# Patient Record
Sex: FEMALE | Race: WHITE | NOT HISPANIC OR LATINO | Employment: OTHER | URBAN - METROPOLITAN AREA
[De-identification: names, ages, dates, MRNs, and addresses within clinical notes are randomized per-mention and may not be internally consistent; named-entity substitution may affect disease eponyms.]

---

## 2017-02-23 ENCOUNTER — GENERIC CONVERSION - ENCOUNTER (OUTPATIENT)
Dept: OTHER | Facility: OTHER | Age: 62
End: 2017-02-23

## 2017-04-06 ENCOUNTER — ALLSCRIPTS OFFICE VISIT (OUTPATIENT)
Dept: OTHER | Facility: OTHER | Age: 62
End: 2017-04-06

## 2017-05-17 ENCOUNTER — ALLSCRIPTS OFFICE VISIT (OUTPATIENT)
Dept: OTHER | Facility: OTHER | Age: 62
End: 2017-05-17

## 2017-07-17 ENCOUNTER — ALLSCRIPTS OFFICE VISIT (OUTPATIENT)
Dept: OTHER | Facility: OTHER | Age: 62
End: 2017-07-17

## 2017-07-17 DIAGNOSIS — Z01.818 ENCOUNTER FOR OTHER PREPROCEDURAL EXAMINATION: ICD-10-CM

## 2017-07-17 DIAGNOSIS — H54.7 VISUAL LOSS: ICD-10-CM

## 2017-07-17 DIAGNOSIS — H26.9 CATARACT: ICD-10-CM

## 2017-07-18 ENCOUNTER — TRANSCRIBE ORDERS (OUTPATIENT)
Dept: LAB | Facility: CLINIC | Age: 62
End: 2017-07-18

## 2017-07-18 ENCOUNTER — APPOINTMENT (OUTPATIENT)
Dept: LAB | Facility: CLINIC | Age: 62
End: 2017-07-18
Payer: COMMERCIAL

## 2017-07-18 DIAGNOSIS — Z01.818 ENCOUNTER FOR OTHER PREPROCEDURAL EXAMINATION: ICD-10-CM

## 2017-07-18 DIAGNOSIS — H54.7 VISUAL LOSS: ICD-10-CM

## 2017-07-18 DIAGNOSIS — H26.9 CATARACT: ICD-10-CM

## 2017-07-18 LAB
APTT PPP: 26 SECONDS (ref 23–35)
BASOPHILS # BLD AUTO: 0.03 THOUSANDS/ΜL (ref 0–0.1)
BASOPHILS NFR BLD AUTO: 1 % (ref 0–1)
EOSINOPHIL # BLD AUTO: 0.26 THOUSAND/ΜL (ref 0–0.61)
EOSINOPHIL NFR BLD AUTO: 4 % (ref 0–6)
ERYTHROCYTE [DISTWIDTH] IN BLOOD BY AUTOMATED COUNT: 13.7 % (ref 11.6–15.1)
HCT VFR BLD AUTO: 41.7 % (ref 34.8–46.1)
HGB BLD-MCNC: 13.8 G/DL (ref 11.5–15.4)
INR PPP: 0.91 (ref 0.86–1.16)
LYMPHOCYTES # BLD AUTO: 2.04 THOUSANDS/ΜL (ref 0.6–4.47)
LYMPHOCYTES NFR BLD AUTO: 32 % (ref 14–44)
MCH RBC QN AUTO: 30.2 PG (ref 26.8–34.3)
MCHC RBC AUTO-ENTMCNC: 33.1 G/DL (ref 31.4–37.4)
MCV RBC AUTO: 91 FL (ref 82–98)
MONOCYTES # BLD AUTO: 0.56 THOUSAND/ΜL (ref 0.17–1.22)
MONOCYTES NFR BLD AUTO: 9 % (ref 4–12)
NEUTROPHILS # BLD AUTO: 3.49 THOUSANDS/ΜL (ref 1.85–7.62)
NEUTS SEG NFR BLD AUTO: 54 % (ref 43–75)
NRBC BLD AUTO-RTO: 0 /100 WBCS
PLATELET # BLD AUTO: 265 THOUSANDS/UL (ref 149–390)
PMV BLD AUTO: 11.8 FL (ref 8.9–12.7)
PROTHROMBIN TIME: 12.3 SECONDS (ref 12.1–14.4)
RBC # BLD AUTO: 4.57 MILLION/UL (ref 3.81–5.12)
WBC # BLD AUTO: 6.39 THOUSAND/UL (ref 4.31–10.16)

## 2017-07-18 PROCEDURE — 85610 PROTHROMBIN TIME: CPT

## 2017-07-18 PROCEDURE — 85730 THROMBOPLASTIN TIME PARTIAL: CPT

## 2017-07-18 PROCEDURE — 36415 COLL VENOUS BLD VENIPUNCTURE: CPT

## 2017-07-18 PROCEDURE — 85025 COMPLETE CBC W/AUTO DIFF WBC: CPT

## 2017-07-26 ENCOUNTER — GENERIC CONVERSION - ENCOUNTER (OUTPATIENT)
Dept: OTHER | Facility: OTHER | Age: 62
End: 2017-07-26

## 2017-09-21 ENCOUNTER — ALLSCRIPTS OFFICE VISIT (OUTPATIENT)
Dept: OTHER | Facility: OTHER | Age: 62
End: 2017-09-21

## 2017-12-14 ENCOUNTER — ALLSCRIPTS OFFICE VISIT (OUTPATIENT)
Dept: OTHER | Facility: OTHER | Age: 62
End: 2017-12-14

## 2017-12-14 DIAGNOSIS — R04.2 HEMOPTYSIS: ICD-10-CM

## 2017-12-14 DIAGNOSIS — Z78.0 ASYMPTOMATIC MENOPAUSAL STATE: ICD-10-CM

## 2017-12-14 DIAGNOSIS — Z12.31 ENCOUNTER FOR SCREENING MAMMOGRAM FOR MALIGNANT NEOPLASM OF BREAST: ICD-10-CM

## 2017-12-15 NOTE — PROGRESS NOTES
Assessment  1  Acute sinus infection (461 9) (J01 90)   2  ETD (eustachian tube dysfunction) (381 81) (H69 80)   3  Allergic rhinitis (477 9) (J30 9)   4  Hemoptysis (786 30) (R04 2)   5  Chronic obstructive pulmonary disease (496) (J44 9)   · [facility]   6  Hypothyroidism (244 9) (E03 9)   7  Hyperlipidemia (272 4) (E78 5)   8  Other screening mammogram (V76 12) (Z12 31)   9  Postmenopausal estrogen deficiency (V49 81) (Z78 0)   · On ERT per [facility]   10  Encounter for preventive health examination (V70 0) (Z00 00)    Plan  Acute sinus infection, Allergic rhinitis, Chronic obstructive pulmonary disease, HealthMaintenance, ETD (eustachian tube dysfunction), Hemoptysis, Hyperlipidemia,Hypothyroidism, Other screening mammogram, Postmenopausal estrogen deficiency    · Continue with our present treatment plan ; Status:Complete;   Done: 33Sbt698206:30PM   · Drink plenty of fluids ; Status:Complete;   Done: 77UJC2036 05:30PM   · Eat a low fat and low cholesterol diet ; Status:Complete;   Done: 70RHE9894 05:30PM   · Gargle with warm salt water for 5 minutes every 4 hours ; Status:Complete;   Done:98Lkr2667 05:30PM   · Follow-up visit in 3 months Evaluation and Treatment  Follow-up  Status: Hold For -Scheduling  Requested for: 88KWI0379   · (1) CBC/ PLT (NO DIFF); Status:Active; Requested for:14Mar2018;    · (1) COMPREHENSIVE METABOLIC PANEL; Status:Active; Requested for:14Mar2018;    · (1) LIPID PANEL, FASTING; Status:Active; Requested for:14Mar2018;    · (1) OCCULT BLOOD, FECAL IMMUNOCHEMICAL TEST; Status:Active; Requestedfor:14Mar2018;    · (1) T4, FREE; Status:Active; Requested for:14Mar2018;    · (1) TSH; Status:Active; Requested for:14Mar2018;    · (1) URINALYSIS (will reflex a microscopy if leukocytes, occult blood, protein or nitritesare not within normal limits); Status:Active; Requested for:14Mar2018;    Allergic rhinitis, ETD (eustachian tube dysfunction)    · Fluticasone Propionate 50 MCG/ACT Nasal Suspension; USE 2 SPRAYS IN EACHNOSTRIL ONCE DAILY  Hemoptysis    · * XR CHEST PA & LATERAL; Status:Active; Requested for:53Hby1712; Other screening mammogram    · * MAMMO SCREENING BILATERAL W CAD; Status:Hold For - Scheduling; Requestedfor:47Ucb2357;    · * MAMMO SCREENING BILATERAL W CAD ; every 1 year; Next 09Aug2017;Status:Active  Postmenopausal estrogen deficiency    · * DXA BONE DENSITY SPINE HIP AND PELVIS; Status:Hold For - Scheduling;Requested for:46Vuz1887;     Discussion/Summary    1  Acute sinusitis, Ceftin was ordered2  Allergic rhinitis 3  Eustachian tube dysfunction  Patient use Flonase Singulair and Claritin daily4  Hemoptysis, chest x-ray is ordered5  COPD, stable patient follows up with Pulmonary Medicine6  Hyperlipidemia, low-fat diet recommended blood work is ordered7  Hypothyroidism, blood work is ordered8  Health maintenance, mammography is ordered, DEXA scan is ordered, Hemoccult card is ordered9  Patient to return in 3 months for office visit blood work, however still symptoms next week return to office  Possible side effects of new medications were reviewed with the patient/guardian today  The treatment plan was reviewed with the patient/guardian  The patient/guardian understands and agrees with the treatment plan     Self Referrals: No      Chief Complaint  sinus problem past few weeks now she has post nasal drip, sore throat and cough with some phlem    noticed some blood with yellow mucus when she blows her nose  taking claritin over the counter  has not had a mammo in a few years  History of Present Illness  HPI: For the past week or 2 patient has had increasing sinus pain and pressure sneezing PRA postnasal drip mild otalgia negative otorrhea  Patient is using her Claritin-D and Flonase daily patient has Singulair at home but is not using it  Patient did have blood-tinged sputum  Review of Systems   Constitutional: No fever chills  ENT: as noted in HPI    Cardiovascular: no complaints of slow or fast heart rate, no chest pain, no palpitations, no leg claudication or lower extremity edema  Respiratory: as noted in HPI  Breasts: no complaints of breast pain, breast lump or nipple discharge  Gastrointestinal: no complaints of abdominal pain, no constipation, no nausea or diarrhea, no vomiting, no bloody stools  Genitourinary: Has not seen a gynecologist in over a year patient is behind under mammography  Musculoskeletal: Overdue for DEXA scan  Integumentary: no complaints of skin rash or lesion, no itching or dry skin, no skin wounds  Neurological: no complaints of headache, no confusion, no numbness or tingling, no dizziness or fainting  Active Problems  1  Allergic rhinitis (477 9) (J30 9)   2  Chronic obstructive pulmonary disease (496) (J44 9)   3  Hyperlipidemia (272 4) (E78 5)   4  Hypothyroidism (244 9) (E03 9)   5  Need for immunization against influenza (V04 81) (Z23)   6  Osteoarthritis (715 90) (M19 90)   7  Other screening mammogram (V76 12) (Z12 31)   8  Postmenopausal estrogen deficiency (V49 81) (Z78 0)   9  Ptosis Left Eye    Past Medical History  1  History of Dysfunction of Eustachian tube, unspecified laterality (381 81) (H69 80)   2  History of acute conjunctivitis (V12 49) (Z86 69)   3  History of Knee Sprain (844 9)  Active Problems And Past Medical History Reviewed: The active problems and past medical history were reviewed and updated today  Family History  Mother    1  Family history of Breast Cancer (V16 3)   2  Family history of Hypertension (V17 49)  Father    3  Family history of Reported Family History Of Alcoholism  Sister    4  Family history of Drug Dependence  Family History Reviewed: The family history was reviewed and updated today  Social History   · Daily Coffee Consumption (3  Cups/Day)   · Former smoker (H26 73) (O93 304)   · Quit over 30 years  Bartholome Pinks Bartholome Pinks Smoked for about 15 years three packs of cigarettes a day     · Occupation:   · Teacher: English as a second Language   · [facility] [employer] [employer]   · Stopped Drinking Alcohol  The social history was reviewed and updated today  Surgical History  1  History of Colonoscopy (Fiberoptic) Screening   2  History of Colostomy Revision   3  History of Shoulder Surgery   4  History of Total Abdominal Hysterectomy  Surgical History Reviewed: The surgical history was reviewed and updated today  Current Meds   1  Claritin-D 24 Hour TB24; TAKE 1 TABLET DAILY AS DIRECTED; Therapy: (Recorded:82Ych8086) to Recorded   2  Meloxicam 15 MG Oral Tablet; TAKE 1 TABLET DAILY WITH FOOD; Therapy: 15Dma0845 to (Evaluate:27Msl6659)  Requested for: 46Tjq4548; Last Rx:84Vww5347 Ordered   3  Montelukast Sodium 10 MG Oral Tablet; Take 1 tablet daily; Therapy: 84HTX7108 to (Evaluate:11Ltx3347)  Requested for: 87VCB1951; Last Rx:46Baw2801 Ordered   4  Premarin 0 625 MG Oral Tablet; TAKE 1 TABLET DAILY; Therapy: (Recorded:68Lbv1013) to Recorded   5  Spiriva HandiHaler 18 MCG Inhalation Capsule; inhale contents of 1 capsule once daily; Therapy: 50Gtd1305 to (Evaluate:10Bco6951)  Requested for: 60RDT4406; Last Rx:38Rct3945 Ordered   6  Symbicort 160-4 5 MCG/ACT Inhalation Aerosol; INHALE PUFFS  PRN  Requested for: 00Ajs5634; Last Rx:53Cut8183 Ordered    The medication list was reviewed and updated today  Allergies  1  Alcohol LIQD  2  NARCOTICS    Vitals   Recorded: 15OCH8705 05:09PM   Temperature 97 3 F   Heart Rate 60   Respiration 12   Systolic 409   Diastolic 80   Height 5 ft 9 in   Weight 192 lb    BMI Calculated 28 35   BSA Calculated 2 03     Physical Exam   Constitutional  General appearance: No acute distress, well appearing and well nourished  Eyes  Conjunctiva and lids: No swelling, erythema or discharge  Ears, Nose, Mouth, and Throat  External inspection of ears and nose: Normal    Otoscopic examination: Abnormal  -- Both tympanic membranes are dull no injection no fluid  Nasal mucosa, septum, and turbinates: Abnormal  -- Positive allergic turbinates positive pansinus tenderness to percussion  Oropharynx: Abnormal  -- Purulent postnasal drip minor injection negative exudate  Pulmonary  Respiratory effort: No increased work of breathing or signs of respiratory distress  Auscultation of lungs: Clear to auscultation  Cardiovascular  Palpation of heart: Normal PMI, no thrills  Auscultation of heart: Normal rate and rhythm, normal S1 and S2, without murmurs  Examination of extremities for edema and/or varicosities: Normal    Carotid pulses: Normal    Lymphatic  Palpation of lymph nodes in neck: Abnormal  -- Positive shotty anterior nodes  Musculoskeletal  Gait and station: Normal    Skin Warm and dry  Neurologic Negative gross focal motor deficit    Psychiatric  Mood and affect: Normal          Signatures   Electronically signed by : Robinson Nation DO; Dec 14 2017  5:33PM EST                       (Author)

## 2017-12-18 ENCOUNTER — APPOINTMENT (OUTPATIENT)
Dept: RADIOLOGY | Facility: MEDICAL CENTER | Age: 62
End: 2017-12-18
Payer: COMMERCIAL

## 2017-12-18 ENCOUNTER — TRANSCRIBE ORDERS (OUTPATIENT)
Dept: ADMINISTRATIVE | Facility: HOSPITAL | Age: 62
End: 2017-12-18

## 2017-12-18 DIAGNOSIS — R04.2 HEMOPTYSIS: ICD-10-CM

## 2017-12-18 PROCEDURE — 71020 HB CHEST X-RAY 2VW FRONTAL&LATL: CPT

## 2017-12-20 ENCOUNTER — GENERIC CONVERSION - ENCOUNTER (OUTPATIENT)
Dept: OTHER | Facility: OTHER | Age: 62
End: 2017-12-20

## 2018-01-10 NOTE — RESULT NOTES
Verified Results  * XR SHOULDER 2+ VIEW LEFT 55Rgb0205 03:55PM Kevin Kenney Order Number: [id number]     Test Name Result Flag Reference   XR SHOULDER 2+ VW LEFT (Report)     LEFT SHOULDER     INDICATION: Left shoulder pain  COMPARISON: None     VIEWS: 3; 3 images     FINDINGS:     There is no acute fracture or dislocation  There is mild osteoarthritis of the left AC joint     No lytic or blastic lesions are seen  Soft tissues are unremarkable  IMPRESSION:     Mild osteoarthritis of the left AC joint   Otherwise unremarkable study of the left shoulder       Workstation performed: [id number]     Signed by:   Melony Dyson MD   7/25/16

## 2018-01-11 NOTE — RESULT NOTES
Message   pt has moderate to severe sinusities on his CT    ask if the patient still has his sx will need to give Bactrim and flucanzole since there is suspecison of fungal infection , please make sure pt pick it at the pharmacy if he has sx   please refer pt to ENT as well due to abnormal CT of sinuses      Verified Results  CT SINUS WO CONTRAST 71Efc5143 11:04AM Shyla Adorno Order Number: [id number]    - Patient Instructions: To schedule this appointment, please contact Central Scheduling at [phone]  Test Name Result Flag Reference   CT SINUS WO CONTRAST (Report)     CT SINUSES     INDICATION: Recurrent sinus infections     COMPARISON: Radiograph 9/17/2016     TECHNIQUE: Axial CT imaging through the sinuses was performed  In addition, sagittal and coronal reformatted images were submitted for interpretation  This examination, like all CT scans performed in the [facility], was performed    utilizing techniques to minimize radiation dose exposure, including the use of iterative reconstruction and automated exposure control  IMAGE QUALITY: Diagnostic  FINDINGS:      FRONTAL SINUSES: Moderate subtotal left greater than right bilateral opacification  ETHMOID AIR CELLS: Near complete bilateral opacification  Scattered hyperdense elements especially in the posterior ethmoid air cells noted  MAXILLARY SINUSES: Frothy secretions moderately opacified the right  Milder to moderate peripheral polypoid mucosal thickening on the left  Mild hyperdense elements noted centrally admixed with the left maxillary sinus disease  SPHENOID SINUSES: Moderate to severe subtotal opacification  Scattered hyperdense elements noted centrally in the sphenoid sinus  NASAL SEPTUM AND CAVITY: Midline  Normal nasal cavity  ANTERIOR OSTEOMEATAL COMPLEX: Bilaterally occluded     OSSEOUS STRUCTURES: No bony erosion or destruction  Normal cribiform plate and Planum Spendoidale  Visulaized mastoid temporal bones are clear  The bony walls of the carotid canals are intact  SOFT TISSUES: Normal        IMPRESSION:     1  Moderate to severe pansinusitis  Scattered hyperdense elements may represent superimposed fungal colonization and/or inspissated secretions  Further clinical evaluation noted  Workstation performed: [id number]     Signed by:    Amarilys Garza MD   12/22/16       Plan  Acute recurrent sinusitis, unspecified location    · Fluconazole 200 MG Oral Tablet (Diflucan); TAKE 1 TABLET 1 TIME ONLY   · Sulfamethoxazole-Trimethoprim 800-160 MG Oral Tablet (Bactrim DS); TAKE 1  TABLET TWICE DAILY UNTIL FINISHED

## 2018-01-11 NOTE — RESULT NOTES
Verified Results  * XR SINUSES ROUTINE 3+ VIEWS 91WMH9412 05:55PM Felipe Mcneill Order Number: [id number]     Test Name Result Flag Reference   XR SINUSES ROUTINE 3+ VIEWS (Report)     PARANASAL SINUSES     INDICATION: Runny nose, postnasal drip and congestion  COMPARISON: September 8, 2010     VIEWS: 5; 5 images     FINDINGS:     Air-fluid level in both maxillary sinuses, larger on the right  Other sinuses clear  No evidence of bone destruction or osteoblastic lesion  IMPRESSION:     Air-fluid levels, consistent with acute sinusitis, in both maxillary sinuses         Workstation performed: [id number]     Signed by:   Wade Shirley MD   2/18/16

## 2018-01-12 VITALS
BODY MASS INDEX: 28.73 KG/M2 | RESPIRATION RATE: 16 BRPM | HEIGHT: 69 IN | WEIGHT: 194 LBS | DIASTOLIC BLOOD PRESSURE: 70 MMHG | HEART RATE: 68 BPM | TEMPERATURE: 98.1 F | SYSTOLIC BLOOD PRESSURE: 120 MMHG

## 2018-01-13 VITALS
RESPIRATION RATE: 16 BRPM | HEIGHT: 69 IN | WEIGHT: 185 LBS | OXYGEN SATURATION: 96 % | BODY MASS INDEX: 27.4 KG/M2 | SYSTOLIC BLOOD PRESSURE: 128 MMHG | TEMPERATURE: 97.5 F | DIASTOLIC BLOOD PRESSURE: 72 MMHG | HEART RATE: 64 BPM

## 2018-01-14 VITALS
DIASTOLIC BLOOD PRESSURE: 62 MMHG | TEMPERATURE: 97.8 F | BODY MASS INDEX: 28.73 KG/M2 | HEIGHT: 69 IN | WEIGHT: 194 LBS | SYSTOLIC BLOOD PRESSURE: 128 MMHG | RESPIRATION RATE: 16 BRPM | OXYGEN SATURATION: 96 % | HEART RATE: 72 BPM

## 2018-01-14 NOTE — RESULT NOTES
Message   Recorded as Task   Date: 08/15/2016 12:28 PM, Created By: Sánchez Pryor   Task Name: Call Back   Assigned To: Nishi Ingram   Regarding Patient: Stephon Madera, Status: Active   Comment:    Sánchez Pryor - 15 Aug 2016 12:28 PM     TASK CREATED  Patient canceled her appointment for today  Please let her know her left shoulder x-ray just shows mild arthritis at the left before meals joint otherwise shoulder was normal   Melly Deal - 17 Aug 2016 12:45 PM     TASK REPLIED TO: Previously Assigned To Gustavo,Clinical Team  Need clarification please   Sánchez Pryor - 17 Aug 2016 1:03 PM     TASK REPLIED TO: Previously Assigned To Sánchez Pryor              Shoulder x-ray showed mild arthritis at the where the collarbone meats the shoulder  I e  the AC joint  However there is still gone voice recognition puts before meals   Nishi Ingram - 17 Aug 2016 4:00 PM     TASK REPLIED TO: Previously Assigned To Gustavo,Clinical Team  what?    Sánchez Pryor - 17 Aug 2016 4:01 PM     TASK REPLIED TO: Previously Assigned To Nishi Ingram          shoulder x-ray shows mild arthritis   Nishi Ingram - 18 Aug 2016 9:29 AM     TASK EDITED                 [facility] informing pt of shoulder xray results     Signatures   Electronically signed by : Aaliyah De Leon, ; Aug 18 2016  9:29AM EST                       (Author)

## 2018-01-15 VITALS
BODY MASS INDEX: 28.44 KG/M2 | HEIGHT: 69 IN | TEMPERATURE: 98.2 F | SYSTOLIC BLOOD PRESSURE: 124 MMHG | OXYGEN SATURATION: 96 % | HEART RATE: 74 BPM | WEIGHT: 192 LBS | DIASTOLIC BLOOD PRESSURE: 76 MMHG | RESPIRATION RATE: 16 BRPM

## 2018-01-16 NOTE — RESULT NOTES
Message   Recorded as Task   Date: 12/23/2016 12:04 PM, Created By: Macey Baker   Task Name: Call Patient with results   Assigned To: Keira Regan   Regarding Patient: Kelli Michael, Status: In Progress   Luz Elena Vee - 23 Dec 2016 12:04 PM     Patient Phone: [phone]      pt has moderate to severe sinusities on his CT   ask if the patient still has his sx will need to give Bactrim and flucanzole since there is suspecison of fungal infection , please make sure pt pick it at the pharmacy if he has sx   please refer pt to ENT as well due to abnormal CT of sinuses   Nishi Ingram - 27 Dec 2016 1:19 PM     TASK IN PROGRESS   Nishi Ingram - 27 Dec 2016 1:29 PM     TASK EDITED  pt notified of CT scan results and recommendations  states within the past year she has been through 3 courses of antibiotics and 1 course prednisone  questions if it is harmful to be on so much antibiotic - and condition does not resolve    she uses [facility]     Signatures   Electronically signed by : Kevin Herrera, ; Dec 27 2016  1:40PM EST                       (Author)

## 2018-01-22 VITALS
TEMPERATURE: 97.3 F | BODY MASS INDEX: 28.44 KG/M2 | SYSTOLIC BLOOD PRESSURE: 120 MMHG | WEIGHT: 192 LBS | HEART RATE: 60 BPM | RESPIRATION RATE: 12 BRPM | HEIGHT: 69 IN | DIASTOLIC BLOOD PRESSURE: 80 MMHG

## 2018-01-23 NOTE — RESULT NOTES
Verified Results  * XR CHEST PA & LATERAL [id number] 04:46PM Alberto Crumb Order Number: [id number]     Test Name Result Flag Reference   XR CHEST PA & LATERAL (Report)     CHEST DUAL ENERGY     INDICATION: Hemoptysis and productive cough for weeks  Former smoker with COPD  COMPARISON: 9/8/2010     VIEWS: PA (including soft tissue and bone algorithms) and lateral projections; 4 images     FINDINGS:        Cardiomediastinal silhouette appears unremarkable  The lungs are clear  No pneumothorax or pleural effusion  Visualized osseous structures appear stable with old right clavicular fracture  IMPRESSION:     No active pulmonary disease  Workstation performed: [id number]     Signed by:    Jackeline Gamboa MD   12/20/17

## 2018-02-24 DIAGNOSIS — J30.9 ALLERGIC RHINITIS, UNSPECIFIED CHRONICITY, UNSPECIFIED SEASONALITY, UNSPECIFIED TRIGGER: Primary | ICD-10-CM

## 2018-02-26 PROBLEM — H26.9 BILATERAL CATARACTS: Status: ACTIVE | Noted: 2017-07-17

## 2018-02-26 RX ORDER — MONTELUKAST SODIUM 10 MG/1
TABLET ORAL
Qty: 90 TABLET | Refills: 1 | Status: SHIPPED | OUTPATIENT
Start: 2018-02-26 | End: 2018-03-20 | Stop reason: SDUPTHER

## 2018-03-14 DIAGNOSIS — Z00.00 ENCOUNTER FOR GENERAL ADULT MEDICAL EXAMINATION WITHOUT ABNORMAL FINDINGS: ICD-10-CM

## 2018-03-14 DIAGNOSIS — H69.80 OTHER SPECIFIED DISORDERS OF EUSTACHIAN TUBE, UNSPECIFIED EAR: ICD-10-CM

## 2018-03-14 DIAGNOSIS — J30.9 ALLERGIC RHINITIS: ICD-10-CM

## 2018-03-14 DIAGNOSIS — Z12.31 ENCOUNTER FOR SCREENING MAMMOGRAM FOR MALIGNANT NEOPLASM OF BREAST: ICD-10-CM

## 2018-03-14 DIAGNOSIS — E78.5 HYPERLIPIDEMIA: ICD-10-CM

## 2018-03-14 DIAGNOSIS — E03.9 HYPOTHYROIDISM: ICD-10-CM

## 2018-03-14 DIAGNOSIS — R04.2 HEMOPTYSIS: ICD-10-CM

## 2018-03-14 DIAGNOSIS — J44.9 CHRONIC OBSTRUCTIVE PULMONARY DISEASE (HCC): ICD-10-CM

## 2018-03-14 DIAGNOSIS — Z78.0 ASYMPTOMATIC MENOPAUSAL STATE: ICD-10-CM

## 2018-03-14 DIAGNOSIS — J01.90 ACUTE SINUSITIS: ICD-10-CM

## 2018-03-20 ENCOUNTER — OFFICE VISIT (OUTPATIENT)
Dept: FAMILY MEDICINE CLINIC | Facility: CLINIC | Age: 63
End: 2018-03-20
Payer: COMMERCIAL

## 2018-03-20 VITALS
WEIGHT: 191 LBS | TEMPERATURE: 97.4 F | BODY MASS INDEX: 26.74 KG/M2 | DIASTOLIC BLOOD PRESSURE: 76 MMHG | HEART RATE: 60 BPM | SYSTOLIC BLOOD PRESSURE: 140 MMHG | HEIGHT: 71 IN

## 2018-03-20 DIAGNOSIS — J44.9 CHRONIC OBSTRUCTIVE PULMONARY DISEASE, UNSPECIFIED COPD TYPE (HCC): ICD-10-CM

## 2018-03-20 DIAGNOSIS — J18.9 ATYPICAL PNEUMONIA: Primary | ICD-10-CM

## 2018-03-20 DIAGNOSIS — J30.9 ALLERGIC RHINITIS, UNSPECIFIED CHRONICITY, UNSPECIFIED SEASONALITY, UNSPECIFIED TRIGGER: ICD-10-CM

## 2018-03-20 PROCEDURE — 99214 OFFICE O/P EST MOD 30 MIN: CPT | Performed by: FAMILY MEDICINE

## 2018-03-20 RX ORDER — CYCLOSPORINE 0.5 MG/ML
EMULSION OPHTHALMIC
COMMUNITY
Start: 2018-03-18

## 2018-03-20 RX ORDER — MONTELUKAST SODIUM 10 MG/1
1 TABLET ORAL DAILY
COMMUNITY
Start: 2016-01-28 | End: 2018-09-11

## 2018-03-20 RX ORDER — LORATADINE AND PSEUDOEPHEDRINE 10; 240 MG/1; MG/1
1 TABLET, EXTENDED RELEASE ORAL DAILY
COMMUNITY

## 2018-03-20 RX ORDER — BUDESONIDE AND FORMOTEROL FUMARATE DIHYDRATE 160; 4.5 UG/1; UG/1
AEROSOL RESPIRATORY (INHALATION)
Refills: 3 | COMMUNITY
Start: 2018-02-28 | End: 2018-06-15 | Stop reason: SDUPTHER

## 2018-03-20 RX ORDER — CONJUGATED ESTROGENS 0.62 MG/1
TABLET, FILM COATED ORAL
COMMUNITY
Start: 2018-01-04 | End: 2018-07-02

## 2018-03-20 RX ORDER — AZITHROMYCIN 500 MG/1
500 TABLET, FILM COATED ORAL DAILY
Qty: 5 TABLET | Refills: 0 | Status: SHIPPED | OUTPATIENT
Start: 2018-03-20 | End: 2018-03-25

## 2018-03-20 RX ORDER — FLUTICASONE PROPIONATE 50 MCG
2 SPRAY, SUSPENSION (ML) NASAL DAILY
COMMUNITY
Start: 2017-12-14

## 2018-03-20 RX ORDER — MELOXICAM 15 MG/1
1 TABLET ORAL
COMMUNITY
Start: 2016-07-21 | End: 2020-09-16

## 2018-03-20 NOTE — PATIENT INSTRUCTIONS

## 2018-03-20 NOTE — ASSESSMENT & PLAN NOTE
Started on azithromycin 500 mg daily for 5 days, advised on the side effect of the medication, to return to the office if symptoms not better

## 2018-03-20 NOTE — ASSESSMENT & PLAN NOTE
Stable, no acute exacerbation, to continue with Spiriva, Symbicort on regular basis, to use her albuterol inhaler as needed  If symptoms get worse to return to the office for consideration of systemic steroid

## 2018-03-20 NOTE — PROGRESS NOTES
Assessment/Plan:    Atypical pneumonia  Started on azithromycin 500 mg daily for 5 days, advised on the side effect of the medication, to return to the office if symptoms not better  Chronic obstructive pulmonary disease (HCC)  Stable, no acute exacerbation, to continue with Spiriva, Symbicort on regular basis, to use her albuterol inhaler as needed  If symptoms get worse to return to the office for consideration of systemic steroid  Allergic rhinitis  Stable, continue with Singulair, Claritin, and Flonase  Diagnoses and all orders for this visit:    Atypical pneumonia  -     azithromycin (ZITHROMAX) 500 MG tablet; Take 1 tablet (500 mg total) by mouth daily for 5 days    Chronic obstructive pulmonary disease, unspecified COPD type (HCC)    Allergic rhinitis, unspecified chronicity, unspecified seasonality, unspecified trigger    Other orders  -     SYMBICORT 160-4 5 MCG/ACT inhaler; INHALE PUFFS AS NEEDED  -     loratadine-pseudoephedrine (CLARITIN-D 24 HOUR)  mg per 24 hr tablet; Take 1 tablet by mouth daily  -     conjugated estrogens (PREMARIN) vaginal cream; Insert into the vagina  -     RESTASIS 0 05 % ophthalmic emulsion;   -     PREMARIN 0 625 MG tablet;   -     fluticasone (FLONASE) 50 mcg/act nasal spray; 2 sprays into each nostril daily  -     meloxicam (MOBIC) 15 mg tablet; Take 1 tablet by mouth  -     tiotropium (SPIRIVA HANDIHALER) 18 mcg inhalation capsule; Place 1 capsule into inhaler and inhale daily  -     montelukast (SINGULAIR) 10 mg tablet; Take 1 tablet by mouth daily          Subjective: Cc:patient c/o "chronic" nasal congestion, sore throat, PND, productive cough  No ear pain  ak     Patient ID: Noelle Thrasher is a 57 y o  female      Patient's symptoms started 3-4 days ago with the nasal congestion, sore throat, productive cough of yellow phlegm moderate amount, no fever no chills, patient work as a teacher, no nausea vomiting abdominal pain, mild headache, mild nasal congestion  Patient had acute sinus infection in December 2017 and was treated with Ceftin, patient did not feel back to her baseline since then, patient had a history of COPD she is taking Symbicort with the onset of the acute flare-up which she started taking it 10 days ago, patient uses Spiriva on daily basis,        The following portions of the patient's history were reviewed and updated as appropriate: allergies, current medications, past family history, past medical history, past social history, past surgical history and problem list     Review of Systems   Constitutional: Positive for chills  Negative for activity change, appetite change, fatigue and fever  HENT: Positive for sinus pain, sinus pressure, sore throat, trouble swallowing and voice change  Negative for congestion, drooling, ear discharge, ear pain, mouth sores, rhinorrhea and sneezing  Eyes: Negative for discharge and itching  Respiratory: Positive for cough  Negative for chest tightness, shortness of breath and wheezing  Cardiovascular: Negative for chest pain and leg swelling  Gastrointestinal: Negative for abdominal pain, diarrhea, nausea and vomiting  Genitourinary: Negative for decreased urine volume, dysuria and vaginal bleeding  Musculoskeletal: Negative for arthralgias, back pain, gait problem and neck pain  Skin: Negative for rash  Allergic/Immunologic: Positive for environmental allergies  Negative for food allergies and immunocompromised state  Neurological: Positive for headaches  Psychiatric/Behavioral: Negative for agitation, behavioral problems and decreased concentration  Objective:  Vitals:    03/20/18 1738   BP: 140/76   Pulse: 60   Temp: (!) 97 4 °F (36 3 °C)   Weight: 86 6 kg (191 lb)   Height: 5' 10 5" (1 791 m)        Physical Exam   Constitutional: She is oriented to person, place, and time  She appears well-developed and well-nourished  HENT:   Head: Normocephalic and atraumatic     Eyes: Conjunctivae and EOM are normal  Pupils are equal, round, and reactive to light  Neck: Normal range of motion  Neck supple  Cardiovascular: Normal rate and regular rhythm  Pulmonary/Chest: Effort normal and breath sounds normal            Normal breath sounds on the lower lobes no expiratory wheeze   Musculoskeletal: Normal range of motion  Neurological: She is alert and oriented to person, place, and time  Nursing note and vitals reviewed

## 2018-06-15 DIAGNOSIS — J44.9 CHRONIC OBSTRUCTIVE PULMONARY DISEASE, UNSPECIFIED COPD TYPE (HCC): Primary | ICD-10-CM

## 2018-06-15 RX ORDER — BUDESONIDE AND FORMOTEROL FUMARATE DIHYDRATE 160; 4.5 UG/1; UG/1
2 AEROSOL RESPIRATORY (INHALATION) 2 TIMES DAILY
Qty: 3 INHALER | Refills: 5 | Status: SHIPPED | OUTPATIENT
Start: 2018-06-15 | End: 2019-09-04 | Stop reason: SDUPTHER

## 2018-07-02 ENCOUNTER — OFFICE VISIT (OUTPATIENT)
Dept: PULMONOLOGY | Facility: CLINIC | Age: 63
End: 2018-07-02
Payer: COMMERCIAL

## 2018-07-02 VITALS
HEIGHT: 71 IN | BODY MASS INDEX: 28.03 KG/M2 | DIASTOLIC BLOOD PRESSURE: 76 MMHG | TEMPERATURE: 98.2 F | SYSTOLIC BLOOD PRESSURE: 118 MMHG | WEIGHT: 200.2 LBS | HEART RATE: 77 BPM | OXYGEN SATURATION: 97 %

## 2018-07-02 DIAGNOSIS — J44.9 COPD (CHRONIC OBSTRUCTIVE PULMONARY DISEASE) (HCC): Primary | ICD-10-CM

## 2018-07-02 PROCEDURE — 94010 BREATHING CAPACITY TEST: CPT | Performed by: INTERNAL MEDICINE

## 2018-07-02 PROCEDURE — 99214 OFFICE O/P EST MOD 30 MIN: CPT | Performed by: INTERNAL MEDICINE

## 2018-07-02 RX ORDER — ESTROGENS, CONJUGATED 0.45 MG/1
TABLET, FILM COATED ORAL
Refills: 6 | COMMUNITY
Start: 2018-06-14 | End: 2021-10-06

## 2018-07-02 NOTE — PROGRESS NOTES
Office Progress Note - Pulmonary    Sandra Just 57 y o  female MRN: [id number]    Encounter: [id number]      Assessment:  · Chronic obstructive pulmonary disease  · Allergic rhinitis  Plan:   · Spirometry  · Symbicort 160/4 5, 2 inhalations twice a day  · Spiriva 18 mcg once a day  · Montelukast 10 mg once a day  · Claritin once a day  · Follow-up in 6 months  Discussion:   The patient's COPD is in remission  I have ordered a spirometry to document the severity of her COPD since she did not have a spirometry in a long while  I have maintained her on the Symbicort to 160/4 5, 2 inhalations twice a day  I have emphasized on the importance of taking the inhaler twice a day  Also I have maintained her on the Spiriva once a day  Her allergic rhinitis is well treated  I have maintained her on the fluticasone nasal spray 2 sprays to each nostril once a day and montelukast 10 mg once a day  As she is retiring her prescription plan may change and she may have a different inhaler as preferred for her COPD then the Symbicort  We will address that if it happens  I will see her in 6 months in a follow-up visit  Subjective:   Patient is here for a follow-up visit  In March she had upper respiratory infection which caused severe laryngitis  She lost her voice for about 2 weeks  She was treated with azithromycin and her symptoms improved slowly  She is back to her baseline  She denies any significant cough, wheezing or sputum production  Denies any chest pain or palpitations  She is taking Symbicort 160/4 5, 2 inhalations twice a day and Spiriva 18 mcg 1 inhalation once a day  Denies any significant postnasal dripping  She is taken fluticasone 2 sprays to each nostril once a day, montelukast 10 mg once a day and Claritin once a day  She has no nocturnal symptoms  She just has retired from her job  Her health coverage and prescription plan will change by the end of this year      Review of systems:  A 12 point system review is done and aside from what is stated above the rest of the review of systems is negative  Family history and social history are reviewed  Medications list is reviewed  Vitals: Blood pressure 118/76, pulse 77, temperature 98 2 °F (36 8 °C), temperature source Tympanic, height 5' 10 5" (1 791 m), weight 90 8 kg (200 lb 3 2 oz), SpO2 97 %  ,     Physical Exam  Gen: Awake, alert, oriented x 3, no acute distress  HEENT: Mucous membranes moist, no oral lesions, no thrush  NECK: No accessory muscle use, JVP not elevated  Cardiac: Regular, single S1, single S2, no murmurs, no rubs, no gallops  Lungs:  Clear breath sounds  No wheezing or rhonchi  Abdomen: normoactive bowel sounds, soft nontender, nondistended, no rebound or rigidity, no guarding  Extremities: no cyanosis, no clubbing, no edema  Neuro:  Grossly nonfocal   Skin:  No rash  Spirometry done in the office today and it showed restrictive pattern with no airflow limitation

## 2018-09-11 DIAGNOSIS — J30.9 ALLERGIC RHINITIS: ICD-10-CM

## 2018-09-11 RX ORDER — MONTELUKAST SODIUM 10 MG/1
TABLET ORAL
Qty: 90 TABLET | Refills: 1 | Status: SHIPPED | OUTPATIENT
Start: 2018-09-11 | End: 2019-05-10 | Stop reason: SDUPTHER

## 2018-12-18 ENCOUNTER — OFFICE VISIT (OUTPATIENT)
Dept: FAMILY MEDICINE CLINIC | Facility: CLINIC | Age: 63
End: 2018-12-18
Payer: MEDICARE

## 2018-12-18 VITALS
SYSTOLIC BLOOD PRESSURE: 114 MMHG | HEART RATE: 72 BPM | TEMPERATURE: 97.4 F | DIASTOLIC BLOOD PRESSURE: 72 MMHG | WEIGHT: 195 LBS | BODY MASS INDEX: 27.3 KG/M2 | RESPIRATION RATE: 20 BRPM | HEIGHT: 71 IN

## 2018-12-18 DIAGNOSIS — J01.40 ACUTE PANSINUSITIS, RECURRENCE NOT SPECIFIED: Primary | ICD-10-CM

## 2018-12-18 DIAGNOSIS — H65.03 BILATERAL ACUTE SEROUS OTITIS MEDIA, RECURRENCE NOT SPECIFIED: ICD-10-CM

## 2018-12-18 DIAGNOSIS — E66.3 OVERWEIGHT (BMI 25.0-29.9): ICD-10-CM

## 2018-12-18 DIAGNOSIS — J30.9 ALLERGIC RHINITIS, UNSPECIFIED SEASONALITY, UNSPECIFIED TRIGGER: ICD-10-CM

## 2018-12-18 DIAGNOSIS — J44.9 CHRONIC OBSTRUCTIVE PULMONARY DISEASE, UNSPECIFIED COPD TYPE (HCC): ICD-10-CM

## 2018-12-18 DIAGNOSIS — H69.80 DYSFUNCTION OF EUSTACHIAN TUBE, UNSPECIFIED LATERALITY: ICD-10-CM

## 2018-12-18 PROBLEM — J18.9 ATYPICAL PNEUMONIA: Status: RESOLVED | Noted: 2018-03-20 | Resolved: 2018-12-18

## 2018-12-18 PROBLEM — H69.90 EUSTACHIAN TUBE DYSFUNCTION: Status: ACTIVE | Noted: 2018-12-18

## 2018-12-18 PROCEDURE — 99214 OFFICE O/P EST MOD 30 MIN: CPT | Performed by: FAMILY MEDICINE

## 2018-12-18 RX ORDER — CEPHALEXIN 500 MG/1
CAPSULE ORAL
Qty: 21 CAPSULE | Refills: 0 | Status: SHIPPED | OUTPATIENT
Start: 2018-12-18 | End: 2018-12-25

## 2018-12-18 RX ORDER — IPRATROPIUM BROMIDE 21 UG/1
SPRAY, METERED NASAL
Qty: 30 ML | Refills: 5 | Status: SHIPPED | OUTPATIENT
Start: 2018-12-18 | End: 2019-04-08 | Stop reason: SDUPTHER

## 2018-12-18 NOTE — PATIENT INSTRUCTIONS
Return in 1 week if still symptoms  A before getting on airline, use 2 sprays of Afrin nasal spray the night before trip  And 2 sprays of Afrin before going to airport    Do the same upon return

## 2018-12-18 NOTE — PROGRESS NOTES
Assessment/Plan:  1  Acute pansinusitis, Keflex ordered  2  Per allergic rhinitis 3  Eustachian tube dysfunction 4  Serous otitis media  Continue present therapy Atrovent nasal spray was added  Refer to allergist patient wishes to go in spring or summer  5  COPD, stable patient follows up with Pulmonary Medicine  6  Patient will be flying in January is concerned about her head congestion while on the airplane  Patient told to take Afrin nasal spray 2 sprays the night before 2 sprays at airport before flight  7   Return in 1 week if still symptoms      Eustachian tube dysfunction  Atrovent was prescribed    Allergic rhinitis  Atrovent nasal spray was ordered continue all prior medications  Discussed allergy consultation  Patient will consider in spring or summer  Chronic obstructive pulmonary disease (HCC)  Stable, follow-up with Pulmonary Medicine       Diagnoses and all orders for this visit:    Acute pansinusitis, recurrence not specified  -     cephalexin (KEFLEX) 500 mg capsule; Take 1 capsule 3 times daily for 1 week    Dysfunction of Eustachian tube, unspecified laterality  -     ipratropium (ATROVENT) 0 03 % nasal spray; Use 2 sprays both nostrils 3 times a day as needed    Allergic rhinitis, unspecified seasonality, unspecified trigger  -     Ambulatory referral to Allergy; Future  -     ipratropium (ATROVENT) 0 03 % nasal spray; Use 2 sprays both nostrils 3 times a day as needed    Chronic obstructive pulmonary disease, unspecified COPD type (HCC)    Bilateral acute serous otitis media, recurrence not specified  -     ipratropium (ATROVENT) 0 03 % nasal spray; Use 2 sprays both nostrils 3 times a day as needed    Overweight (BMI 25 0-29  9)          Subjective: Pt here with complains of sinus pressure, sinus drainage x 3 weeks  Pt states she has been issues for a couple of years off and on  JOSEFINA Wilson     Patient ID: Jason Sow is a 70 y o  female      The past 3 days patient increasing sinus pain and pressure sneezing PRA postnasal drip  Patient denies any cough or wheeze  No fever chills  Patient is using her medication for COPD allergies as directed  Patient follows up with Pulmonary Medicine, Dr Natasha Forde for her COPD        The following portions of the patient's history were reviewed and updated as appropriate: allergies, current medications, past family history, past medical history, past social history, past surgical history and problem list     Review of Systems   Constitutional:        HPI   HENT:        HPI   Eyes: Negative  Respiratory:        HPI   Cardiovascular: Negative for chest pain  Gastrointestinal: Negative  Endocrine: Negative  Genitourinary: Negative  Musculoskeletal: Negative  Skin: Negative  Allergic/Immunologic: Positive for environmental allergies  Neurological: Negative  Hematological: Negative  Psychiatric/Behavioral: Negative  Objective:    Vitals:    12/18/18 1251   BP: 114/72   BP Location: Left arm   Patient Position: Sitting   Cuff Size: Large   Pulse: 72   Resp: 20   Temp: (!) 97 4 °F (36 3 °C)   TempSrc: Temporal   Weight: 88 5 kg (195 lb)   Height: 5' 10 5" (1 791 m)            Physical Exam   Constitutional: She is oriented to person, place, and time  She appears well-developed and well-nourished  HENT:   Head: Normocephalic and atraumatic  Mouth/Throat: No oropharyngeal exudate  Both tympanic membranes dull with a minimal amount of fluid no injection positive allergic turbinates positive pansinus tenderness to percussion positive purulent postnasal drip minimal pharyngeal injection negative exudate   Eyes: Pupils are equal, round, and reactive to light  Conjunctivae and EOM are normal  No scleral icterus  Neck: Neck supple  No thyromegaly present  Cardiovascular: Normal rate, regular rhythm and normal heart sounds      Pulmonary/Chest: Effort normal and breath sounds normal    Lymphadenopathy:     She has cervical adenopathy  Neurological: She is alert and oriented to person, place, and time  No cranial nerve deficit  Skin: Skin is warm and dry  BMI Counseling: Body mass index is 27 58 kg/m²  Discussed the patient's BMI with her  The BMI is above average  BMI counseling and education was provided to the patient  Nutrition recommendations include reducing intake of cholesterol

## 2018-12-18 NOTE — ASSESSMENT & PLAN NOTE
Atrovent nasal spray was ordered continue all prior medications  Discussed allergy consultation  Patient will consider in spring or summer

## 2019-01-03 DIAGNOSIS — J44.9 COPD (CHRONIC OBSTRUCTIVE PULMONARY DISEASE) (HCC): Primary | ICD-10-CM

## 2019-01-03 RX ORDER — TIOTROPIUM BROMIDE 18 UG/1
CAPSULE ORAL; RESPIRATORY (INHALATION)
Qty: 30 CAPSULE | Refills: 5 | Status: SHIPPED | OUTPATIENT
Start: 2019-01-03 | End: 2019-07-16 | Stop reason: SDUPTHER

## 2019-04-08 DIAGNOSIS — J30.9 ALLERGIC RHINITIS, UNSPECIFIED SEASONALITY, UNSPECIFIED TRIGGER: ICD-10-CM

## 2019-04-08 DIAGNOSIS — H65.03 BILATERAL ACUTE SEROUS OTITIS MEDIA, RECURRENCE NOT SPECIFIED: ICD-10-CM

## 2019-04-08 DIAGNOSIS — H69.80 DYSFUNCTION OF EUSTACHIAN TUBE, UNSPECIFIED LATERALITY: ICD-10-CM

## 2019-04-08 RX ORDER — IPRATROPIUM BROMIDE 21 UG/1
SPRAY, METERED NASAL
Qty: 30 ML | Refills: 4 | Status: SHIPPED | OUTPATIENT
Start: 2019-04-08 | End: 2021-04-12

## 2019-05-10 DIAGNOSIS — J30.9 ALLERGIC RHINITIS: ICD-10-CM

## 2019-05-10 RX ORDER — MONTELUKAST SODIUM 10 MG/1
TABLET ORAL
Qty: 90 TABLET | Refills: 1 | Status: SHIPPED | OUTPATIENT
Start: 2019-05-10 | End: 2019-11-22 | Stop reason: SDUPTHER

## 2019-06-20 ENCOUNTER — OFFICE VISIT (OUTPATIENT)
Dept: FAMILY MEDICINE CLINIC | Facility: CLINIC | Age: 64
End: 2019-06-20
Payer: MEDICARE

## 2019-06-20 VITALS
RESPIRATION RATE: 20 BRPM | BODY MASS INDEX: 26.32 KG/M2 | DIASTOLIC BLOOD PRESSURE: 68 MMHG | WEIGHT: 188 LBS | SYSTOLIC BLOOD PRESSURE: 116 MMHG | TEMPERATURE: 97.7 F | HEART RATE: 72 BPM | HEIGHT: 71 IN

## 2019-06-20 DIAGNOSIS — R05.9 COUGH: ICD-10-CM

## 2019-06-20 DIAGNOSIS — H69.80 DYSFUNCTION OF EUSTACHIAN TUBE, UNSPECIFIED LATERALITY: ICD-10-CM

## 2019-06-20 DIAGNOSIS — J30.9 ALLERGIC RHINITIS, UNSPECIFIED SEASONALITY, UNSPECIFIED TRIGGER: ICD-10-CM

## 2019-06-20 DIAGNOSIS — J44.9 CHRONIC OBSTRUCTIVE PULMONARY DISEASE, UNSPECIFIED COPD TYPE (HCC): ICD-10-CM

## 2019-06-20 DIAGNOSIS — Z78.0 POSTMENOPAUSAL ESTROGEN DEFICIENCY: ICD-10-CM

## 2019-06-20 DIAGNOSIS — E78.5 HYPERLIPIDEMIA, UNSPECIFIED HYPERLIPIDEMIA TYPE: ICD-10-CM

## 2019-06-20 DIAGNOSIS — J01.40 ACUTE PANSINUSITIS, RECURRENCE NOT SPECIFIED: Primary | ICD-10-CM

## 2019-06-20 DIAGNOSIS — H66.003 ACUTE SUPPURATIVE OTITIS MEDIA OF BOTH EARS WITHOUT SPONTANEOUS RUPTURE OF TYMPANIC MEMBRANES, RECURRENCE NOT SPECIFIED: ICD-10-CM

## 2019-06-20 DIAGNOSIS — Z12.31 SCREENING MAMMOGRAM, ENCOUNTER FOR: ICD-10-CM

## 2019-06-20 DIAGNOSIS — E03.9 HYPOTHYROIDISM, UNSPECIFIED TYPE: ICD-10-CM

## 2019-06-20 PROCEDURE — 99214 OFFICE O/P EST MOD 30 MIN: CPT | Performed by: FAMILY MEDICINE

## 2019-06-20 RX ORDER — CEPHALEXIN 500 MG/1
500 CAPSULE ORAL EVERY 8 HOURS SCHEDULED
Qty: 30 CAPSULE | Refills: 0 | Status: SHIPPED | OUTPATIENT
Start: 2019-06-20 | End: 2019-06-30

## 2019-07-15 ENCOUNTER — TELEPHONE (OUTPATIENT)
Dept: PULMONOLOGY | Facility: CLINIC | Age: 64
End: 2019-07-15

## 2019-07-15 NOTE — TELEPHONE ENCOUNTER
Pharmacy called for a refill on patients Spiriva  Called patient to set up follow up appt first as we have no seen her in over a year

## 2019-07-16 DIAGNOSIS — J44.9 COPD (CHRONIC OBSTRUCTIVE PULMONARY DISEASE) (HCC): ICD-10-CM

## 2019-07-23 ENCOUNTER — TELEPHONE (OUTPATIENT)
Dept: PULMONOLOGY | Facility: HOSPITAL | Age: 64
End: 2019-07-23

## 2019-07-23 NOTE — TELEPHONE ENCOUNTER
LM to patient, Karson won't be available on 9/3 ([facility] office) I moved pt's appt to 9/4 @ 8:30

## 2019-09-04 ENCOUNTER — OFFICE VISIT (OUTPATIENT)
Dept: PULMONOLOGY | Facility: CLINIC | Age: 64
End: 2019-09-04
Payer: MEDICARE

## 2019-09-04 VITALS
BODY MASS INDEX: 26.05 KG/M2 | OXYGEN SATURATION: 96 % | TEMPERATURE: 96.9 F | HEIGHT: 71 IN | SYSTOLIC BLOOD PRESSURE: 140 MMHG | WEIGHT: 186.07 LBS | DIASTOLIC BLOOD PRESSURE: 82 MMHG | HEART RATE: 59 BPM | RESPIRATION RATE: 16 BRPM

## 2019-09-04 DIAGNOSIS — J30.9 ALLERGIC RHINITIS, UNSPECIFIED SEASONALITY, UNSPECIFIED TRIGGER: ICD-10-CM

## 2019-09-04 DIAGNOSIS — J44.9 COPD (CHRONIC OBSTRUCTIVE PULMONARY DISEASE) (HCC): ICD-10-CM

## 2019-09-04 DIAGNOSIS — J44.9 CHRONIC OBSTRUCTIVE PULMONARY DISEASE, UNSPECIFIED COPD TYPE (HCC): Primary | ICD-10-CM

## 2019-09-04 PROCEDURE — 99214 OFFICE O/P EST MOD 30 MIN: CPT | Performed by: NURSE PRACTITIONER

## 2019-09-04 RX ORDER — BUDESONIDE AND FORMOTEROL FUMARATE DIHYDRATE 160; 4.5 UG/1; UG/1
2 AEROSOL RESPIRATORY (INHALATION) 2 TIMES DAILY
Qty: 3 INHALER | Refills: 3 | Status: SHIPPED | OUTPATIENT
Start: 2019-09-04 | End: 2020-11-09

## 2019-09-04 NOTE — ASSESSMENT & PLAN NOTE
· Well controlled COPD, no recent exacerbations  · No need to perform spirometry as this was performed at previous visit with Dr Simon Rasmussen 8%predicted, FVC 79% predicted  · Maintains on Spiriva handihaler 18 mcg I puff daily and symbicort 160-4  5MCG/ACT INH 2 puffs BID and reports compliance with regimen  · Reports intermittent cough productive of scant amounts of clear or white sputum  · Denies GERD symptoms-negative workup in the past  · Currently reports symptoms are well controlled with regimen  · All prescription refills provided as needed  · Follow up  In 6 months

## 2019-09-04 NOTE — PROGRESS NOTES
Pulmonary Follow Up Note   Sadia Londono 75 y o  female MRN: [id number]  9/4/2019      Assessment:    Chronic obstructive pulmonary disease ([facility] )  · Well controlled COPD, no recent exacerbations  · No need to perform spirometry as this was performed at previous visit with Dr Dakota Patel 8%predicted, FVC 79% predicted  · Maintains on Spiriva handihaler 18 mcg I puff daily and symbicort 160-4  5MCG/ACT INH 2 puffs BID and reports compliance with regimen  · Reports intermittent cough productive of scant amounts of clear or white sputum  · Denies GERD symptoms-negative workup in the past  · Currently reports symptoms are well controlled with regimen  · All prescription refills provided as needed  · Follow up  In 6 months    Allergic rhinitis  · Maintains on singulair, claritin, flonase and atrovent nasal spray  · Reports chronic nasal congestion with 2 rounds of ABX since January 2019  · Currently reports congestion is well controlled, will continue with current regimen      Plan:    Diagnoses and all orders for this visit:    Chronic obstructive pulmonary disease, unspecified COPD type ([facility] )  -     SYMBICORT 160-4 5 MCG/ACT inhaler; Inhale 2 puffs 2 (two) times a day Rinse mouth after use  Allergic rhinitis, unspecified seasonality, unspecified trigger    COPD (chronic obstructive pulmonary disease) ([facility])  -     tiotropium (SPIRIVA HANDIHALER) 18 mcg inhalation capsule; Place 1 capsule (18 mcg total) into inhaler and inhale daily        Return in about 6 months (around 3/4/2020), or if symptoms worsen or fail to improve  History of Present Illness   HPI:  Sadia Londono is a 75 y o  female seen in follow up  She has a PMH significant for: allergic rhinitis, COPD and hypothyroidism  Her COPD is currently well controlled, has not required prednisone burst in >1 years  Reports she is currently at her pulmonary baseline    Her main concern is with nasal congestion over the past 6 months, with 2 infections requiring ABX course  Currently nasal congestion is minimal and well controlled with current regimen  Denies sinus pressure, headaches or nasal drainage  Jacqueline of colon Claritin, Singulair, Flonase and Atrovent nasal spray  A recommended that if her symptoms greatly increase in she continues to have recurrent sinus infections to be re-evaluated by ENT  Concern to her Chronic obstructive pulmonary disease all symptoms are well controlled she will continue on her current regimen of Symbicort 160-4 5 mcg 2 puffs b i d  And Spiriva HandiHaler 18 mcg 1 puff daily  She requested samples of Symbicort today,  As well as refills which were provided  She currently denies:  Chest pain, pain with inspiration, fevers, chills, night sweats, bronchospasm,   Or hemoptysis  She reports intermittent scant amounts of clear / white sputum  She denies symptoms of: HAYDEE, GERD or dysphagia  Denies any significant limitations due to her breathing  She reports that known triggers include: Heat and humidity  Follow-up in 6 months or sooner with Dr Zita Lenz    Review of Systems   Constitutional: Negative for activity change, appetite change, chills, diaphoresis, fatigue, fever and unexpected weight change  HENT: Positive for postnasal drip  Negative for rhinorrhea, sinus pressure, sinus pain, sore throat and trouble swallowing  Eyes: Negative for discharge  Respiratory: Negative for apnea, cough, choking, chest tightness, shortness of breath, wheezing and stridor  Cardiovascular: Negative for chest pain and leg swelling  Gastrointestinal: Negative for abdominal distention  Endocrine: Negative for cold intolerance and heat intolerance  Genitourinary: Negative for difficulty urinating  Musculoskeletal: Negative for arthralgias and back pain  Allergic/Immunologic: Negative for environmental allergies, food allergies and immunocompromised state     Neurological: Negative for dizziness, facial asymmetry, light-headedness and headaches  Hematological: Negative for adenopathy  Does not bruise/bleed easily  Psychiatric/Behavioral: Negative for agitation  All other systems reviewed and are negative  Historical Information   Past Medical History:   Diagnosis Date    Cataract     History of total hysterectomy      Past Surgical History:   Procedure Laterality Date    CATARACT EXTRACTION, BILATERAL      COLONOSCOPY      HYSTERECTOMY      REVISION COLOSTOMY      SHOULDER SURGERY      S/P dislocated shoulder     Family History   Problem Relation Age of Onset   Sangita Griggs Breast cancer Mother     Hypertension Mother     Alcohol abuse Father     Drug abuse Sister        Social History     Tobacco Use   Smoking Status Former Smoker    Packs/day: 3 00    Years: 15 00    Pack years: 45 00    Types: Cigarettes    Start date:     Last attempt to quit:     Years since quittin 6   Smokeless Tobacco Never Used         Meds/Allergies     Current Outpatient Medications:     fluticasone (FLONASE) 50 mcg/act nasal spray, 2 sprays into each nostril daily, Disp: , Rfl:     ipratropium (ATROVENT) 0 03 % nasal spray, USE 2 SPRAYS BOTH NOSTRILS 3 TIMES A DAY AS NEEDED, Disp: 30 mL, Rfl: 4    loratadine-pseudoephedrine (CLARITIN-D 24 HOUR)  mg per 24 hr tablet, Take 1 tablet by mouth daily, Disp: , Rfl:     meloxicam (MOBIC) 15 mg tablet, Take 1 tablet by mouth, Disp: , Rfl:     montelukast (SINGULAIR) 10 mg tablet, TAKE 1 TABLET DAILY, Disp: 90 tablet, Rfl: 1    PREMARIN 0 45 MG tablet, TAKE 1 TABLET (0 45 MG TOTAL) BY MOUTH DAILY   FOR 21 DAYS, THEN 7 DAYS OFF, Disp: , Rfl: 6    RESTASIS 0 05 % ophthalmic emulsion, , Disp: , Rfl:     SYMBICORT 160-4 5 MCG/ACT inhaler, Inhale 2 puffs 2 (two) times a day Rinse mouth after use , Disp: 3 Inhaler, Rfl: 3    tiotropium (SPIRIVA HANDIHALER) 18 mcg inhalation capsule, Place 1 capsule (18 mcg total) into inhaler and inhale daily, Disp: 90 capsule, Rfl: 3  Allergies   Allergen Reactions    Alcohol     Other      nicotics         Vitals: Blood pressure 140/82, pulse 59, temperature (!) 96 9 °F (36 1 °C), temperature source Tympanic, resp  rate 16, height 5' 10 5" (1 791 m), weight 84 4 kg (186 lb 1 1 oz), SpO2 96 %  Body mass index is 26 32 kg/m²  Oxygen Therapy  SpO2: 96 %  Oxygen Therapy: None (Room air)    Physical Exam  Physical Exam   Constitutional: She is oriented to person, place, and time  She appears well-developed and well-nourished  No distress  HENT:   Head: Normocephalic and atraumatic  Eyes: Pupils are equal, round, and reactive to light  Conjunctivae and EOM are normal    Neck: Normal range of motion  Neck supple  No tracheal deviation present  Cardiovascular: Normal rate, regular rhythm, normal heart sounds and intact distal pulses  Exam reveals no gallop and no friction rub  No murmur heard  Pulmonary/Chest: Effort normal and breath sounds normal  No stridor  No respiratory distress  She has no wheezes  She has no rales  She exhibits no tenderness  Abdominal: Soft  Bowel sounds are normal    Musculoskeletal: Normal range of motion  She exhibits no edema  Neurological: She is alert and oriented to person, place, and time  Skin: Skin is warm and dry  No erythema  Psychiatric: She has a normal mood and affect  Her behavior is normal  Judgment and thought content normal    Vitals reviewed  Labs: I have personally reviewed pertinent lab results    Lab Results   Component Value Date    WBC 6 39 07/18/2017    HGB 13 8 07/18/2017    HCT 41 7 07/18/2017    MCV 91 07/18/2017     07/18/2017     Lab Results   Component Value Date    GLUCOSE 83 12/02/2013    CALCIUM 9 1 12/02/2013     12/02/2013    K 5 0 12/02/2013    CO2 32 12/02/2013     12/02/2013    BUN 21 12/02/2013    CREATININE 0 86 12/02/2013     No results found for: IGE  Lab Results   Component Value Date    ALT 25 12/02/2013    AST 17 12/02/2013 ALKPHOS 62 12/02/2013    BILITOT 0 23 12/02/2013       Imaging and other studies: no new imaging to review    Pulmonary function testing:  Performed Spirometry  7/2/2018  FEV1/FVC ratio  77%   FEV1  78% predicted  FVC  79% predicted

## 2019-09-04 NOTE — ASSESSMENT & PLAN NOTE
· Maintains on singulair, claritin, flonase and atrovent nasal spray  · Reports chronic nasal congestion with 2 rounds of ABX since January 2019    · Currently reports congestion is well controlled, will continue with current regimen

## 2019-09-12 ENCOUNTER — APPOINTMENT (OUTPATIENT)
Dept: LAB | Facility: CLINIC | Age: 64
End: 2019-09-12
Payer: MEDICARE

## 2019-09-12 DIAGNOSIS — R05.9 COUGH: ICD-10-CM

## 2019-09-12 DIAGNOSIS — E78.5 HYPERLIPIDEMIA, UNSPECIFIED HYPERLIPIDEMIA TYPE: ICD-10-CM

## 2019-09-12 DIAGNOSIS — E03.9 HYPOTHYROIDISM, UNSPECIFIED TYPE: ICD-10-CM

## 2019-09-12 DIAGNOSIS — H66.003 ACUTE SUPPURATIVE OTITIS MEDIA OF BOTH EARS WITHOUT SPONTANEOUS RUPTURE OF TYMPANIC MEMBRANES, RECURRENCE NOT SPECIFIED: ICD-10-CM

## 2019-09-12 DIAGNOSIS — Z12.31 SCREENING MAMMOGRAM, ENCOUNTER FOR: ICD-10-CM

## 2019-09-12 DIAGNOSIS — J44.9 CHRONIC OBSTRUCTIVE PULMONARY DISEASE, UNSPECIFIED COPD TYPE (HCC): ICD-10-CM

## 2019-09-12 DIAGNOSIS — H69.80 DYSFUNCTION OF EUSTACHIAN TUBE, UNSPECIFIED LATERALITY: ICD-10-CM

## 2019-09-12 DIAGNOSIS — J30.9 ALLERGIC RHINITIS, UNSPECIFIED SEASONALITY, UNSPECIFIED TRIGGER: ICD-10-CM

## 2019-09-12 DIAGNOSIS — Z78.0 POSTMENOPAUSAL ESTROGEN DEFICIENCY: ICD-10-CM

## 2019-09-12 DIAGNOSIS — J01.40 ACUTE PANSINUSITIS, RECURRENCE NOT SPECIFIED: ICD-10-CM

## 2019-09-12 LAB
ALBUMIN SERPL BCP-MCNC: 3.7 G/DL (ref 3.5–5)
ALP SERPL-CCNC: 67 U/L (ref 46–116)
ALT SERPL W P-5'-P-CCNC: 21 U/L (ref 12–78)
ANION GAP SERPL CALCULATED.3IONS-SCNC: 6 MMOL/L (ref 4–13)
AST SERPL W P-5'-P-CCNC: 15 U/L (ref 5–45)
BACTERIA UR QL AUTO: ABNORMAL /HPF
BILIRUB SERPL-MCNC: 0.46 MG/DL (ref 0.2–1)
BILIRUB UR QL STRIP: NEGATIVE
BUN SERPL-MCNC: 19 MG/DL (ref 5–25)
CALCIUM SERPL-MCNC: 9.4 MG/DL (ref 8.3–10.1)
CHLORIDE SERPL-SCNC: 111 MMOL/L (ref 100–108)
CHOLEST SERPL-MCNC: 231 MG/DL (ref 50–200)
CLARITY UR: ABNORMAL
CO2 SERPL-SCNC: 25 MMOL/L (ref 21–32)
COLOR UR: YELLOW
CREAT SERPL-MCNC: 0.91 MG/DL (ref 0.6–1.3)
ERYTHROCYTE [DISTWIDTH] IN BLOOD BY AUTOMATED COUNT: 13.4 % (ref 11.6–15.1)
GFR SERPL CREATININE-BSD FRML MDRD: 66 ML/MIN/1.73SQ M
GLUCOSE P FAST SERPL-MCNC: 91 MG/DL (ref 65–99)
GLUCOSE UR STRIP-MCNC: NEGATIVE MG/DL
HCT VFR BLD AUTO: 44.1 % (ref 34.8–46.1)
HDLC SERPL-MCNC: 73 MG/DL (ref 40–60)
HGB BLD-MCNC: 14 G/DL (ref 11.5–15.4)
HGB UR QL STRIP.AUTO: NEGATIVE
KETONES UR STRIP-MCNC: ABNORMAL MG/DL
LDLC SERPL CALC-MCNC: 132 MG/DL (ref 0–100)
LEUKOCYTE ESTERASE UR QL STRIP: ABNORMAL
MCH RBC QN AUTO: 29.4 PG (ref 26.8–34.3)
MCHC RBC AUTO-ENTMCNC: 31.7 G/DL (ref 31.4–37.4)
MCV RBC AUTO: 93 FL (ref 82–98)
NITRITE UR QL STRIP: NEGATIVE
NON-SQ EPI CELLS URNS QL MICRO: ABNORMAL /HPF
PH UR STRIP.AUTO: 5.5 [PH]
PLATELET # BLD AUTO: 281 THOUSANDS/UL (ref 149–390)
PMV BLD AUTO: 11.6 FL (ref 8.9–12.7)
POTASSIUM SERPL-SCNC: 4.1 MMOL/L (ref 3.5–5.3)
PROT SERPL-MCNC: 7.3 G/DL (ref 6.4–8.2)
PROT UR STRIP-MCNC: NEGATIVE MG/DL
RBC # BLD AUTO: 4.77 MILLION/UL (ref 3.81–5.12)
RBC #/AREA URNS AUTO: ABNORMAL /HPF
SODIUM SERPL-SCNC: 142 MMOL/L (ref 136–145)
SP GR UR STRIP.AUTO: 1.02 (ref 1–1.03)
T4 FREE SERPL-MCNC: 1 NG/DL (ref 0.76–1.46)
T4 SERPL-MCNC: 9.8 UG/DL (ref 4.7–13.3)
TRIGL SERPL-MCNC: 131 MG/DL
TSH SERPL DL<=0.05 MIU/L-ACNC: 6.06 UIU/ML (ref 0.36–3.74)
UROBILINOGEN UR QL STRIP.AUTO: 0.2 E.U./DL
WBC # BLD AUTO: 6.27 THOUSAND/UL (ref 4.31–10.16)
WBC #/AREA URNS AUTO: ABNORMAL /HPF

## 2019-09-12 PROCEDURE — 85027 COMPLETE CBC AUTOMATED: CPT

## 2019-09-12 PROCEDURE — 84443 ASSAY THYROID STIM HORMONE: CPT

## 2019-09-12 PROCEDURE — 36415 COLL VENOUS BLD VENIPUNCTURE: CPT

## 2019-09-12 PROCEDURE — 81001 URINALYSIS AUTO W/SCOPE: CPT

## 2019-09-12 PROCEDURE — 80061 LIPID PANEL: CPT

## 2019-09-12 PROCEDURE — 84439 ASSAY OF FREE THYROXINE: CPT

## 2019-09-12 PROCEDURE — 80053 COMPREHEN METABOLIC PANEL: CPT

## 2019-09-13 ENCOUNTER — HOSPITAL ENCOUNTER (OUTPATIENT)
Dept: BONE DENSITY | Facility: MEDICAL CENTER | Age: 64
Discharge: HOME | End: 2019-09-13
Payer: MEDICARE

## 2019-09-13 DIAGNOSIS — Z78.0 POSTMENOPAUSAL ESTROGEN DEFICIENCY: ICD-10-CM

## 2019-09-13 PROCEDURE — 77080 DXA BONE DENSITY AXIAL: CPT

## 2019-09-24 ENCOUNTER — OFFICE VISIT (OUTPATIENT)
Dept: FAMILY MEDICINE CLINIC | Facility: CLINIC | Age: 64
End: 2019-09-24
Payer: MEDICARE

## 2019-09-24 VITALS
DIASTOLIC BLOOD PRESSURE: 80 MMHG | SYSTOLIC BLOOD PRESSURE: 136 MMHG | BODY MASS INDEX: 25.9 KG/M2 | HEART RATE: 72 BPM | WEIGHT: 185 LBS | RESPIRATION RATE: 16 BRPM | HEIGHT: 71 IN

## 2019-09-24 DIAGNOSIS — Z00.00 HEALTHCARE MAINTENANCE: ICD-10-CM

## 2019-09-24 DIAGNOSIS — E78.5 HYPERLIPIDEMIA, UNSPECIFIED HYPERLIPIDEMIA TYPE: ICD-10-CM

## 2019-09-24 DIAGNOSIS — L30.9 FACIAL DERMATITIS: ICD-10-CM

## 2019-09-24 DIAGNOSIS — M15.9 OSTEOARTHRITIS OF MULTIPLE JOINTS, UNSPECIFIED OSTEOARTHRITIS TYPE: ICD-10-CM

## 2019-09-24 DIAGNOSIS — E03.9 HYPOTHYROIDISM, UNSPECIFIED TYPE: Primary | ICD-10-CM

## 2019-09-24 DIAGNOSIS — J44.9 CHRONIC OBSTRUCTIVE PULMONARY DISEASE, UNSPECIFIED COPD TYPE (HCC): ICD-10-CM

## 2019-09-24 DIAGNOSIS — L71.9 ROSACEA: ICD-10-CM

## 2019-09-24 DIAGNOSIS — Z23 NEED FOR PNEUMOCOCCAL VACCINATION: ICD-10-CM

## 2019-09-24 PROCEDURE — G0438 PPPS, INITIAL VISIT: HCPCS | Performed by: FAMILY MEDICINE

## 2019-09-24 PROCEDURE — 90732 PPSV23 VACC 2 YRS+ SUBQ/IM: CPT

## 2019-09-24 PROCEDURE — 99214 OFFICE O/P EST MOD 30 MIN: CPT | Performed by: FAMILY MEDICINE

## 2019-09-24 PROCEDURE — G0009 ADMIN PNEUMOCOCCAL VACCINE: HCPCS

## 2019-09-24 RX ORDER — LEVOTHYROXINE SODIUM 0.03 MG/1
25 TABLET ORAL
Qty: 30 TABLET | Refills: 5 | Status: SHIPPED | OUTPATIENT
Start: 2019-09-24 | End: 2020-03-16

## 2019-09-24 RX ORDER — METRONIDAZOLE 7.5 MG/G
GEL TOPICAL
Qty: 45 G | Refills: 1 | Status: SHIPPED | OUTPATIENT
Start: 2019-09-24

## 2019-09-24 NOTE — PROGRESS NOTES
Assessment and Plan:   1  Hypothyroidism / fatigue, start levothyroxine 25 mcg daily check T4 TSH in 3 weeks   2  Rosacea /facial dermatitis, retrosternal started  Check ROSA will rule out lupus  3  Hyperlipidemia, diet controlled  4  DJD, uses meloxicam as needed  5  COPD, stable follows with Pulmonary Medicine  6  Health care maintenance see wellness exam 2nd note of the day  Pneumovax 23 given  Patient declines flu vaccine  BMI of 26 17 discussed        Problem List Items Addressed This Visit        Endocrine    Hypothyroidism - Primary      Levothyroxine 25 mcg daily was started  Check T4 TSH in 3 weeks         Relevant Medications    levothyroxine 25 mcg tablet    Other Relevant Orders    T4    TSH, 3rd generation with Free T4 reflex    ROSA Screen w/ Reflex to Titer/Pattern       Respiratory    Chronic obstructive pulmonary disease ([address] )      Stable patient follows up with Pulmonary Medicine            Musculoskeletal and Integument    Osteoarthritis      Using meloxicam as needed         Rosacea      Metrogel ordered         Relevant Medications    metroNIDAZOLE (METROGEL) 0 75 % gel       Other    Hyperlipidemia      Stable, diet-controlled         Healthcare maintenance      Medicare wellness questionnaire completed, Pneumovax 23 given  Patient declines flu           Other Visit Diagnoses     Facial dermatitis        Relevant Medications    metroNIDAZOLE (METROGEL) 0 75 % gel    Other Relevant Orders    T4    TSH, 3rd generation with Free T4 reflex    ROSA Screen w/ Reflex to Titer/Pattern                 Diagnoses and all orders for this visit:    Hypothyroidism, unspecified type  -     levothyroxine 25 mcg tablet; Take 1 tablet (25 mcg total) by mouth daily in the early morning  -     T4; Future  -     TSH, 3rd generation with Free T4 reflex; Future  -     ROSA Screen w/ Reflex to Titer/Pattern; Future    Facial dermatitis  -     T4; Future  -     TSH, 3rd generation with Free T4 reflex;  Future  - ROSA Screen w/ Reflex to Titer/Pattern; Future    Hyperlipidemia, unspecified hyperlipidemia type    Rosacea  -     metroNIDAZOLE (METROGEL) 0 75 % gel; Apply to face twice daily for rosacea    Chronic obstructive pulmonary disease, unspecified COPD type ([facility] )    Osteoarthritis of multiple joints, unspecified osteoarthritis type    Healthcare maintenance              Subjective:      Patient ID: Ruthy Flores is a 75 y o  female  CC:    Chief Complaint   Patient presents with    Follow-up     pt here for a follow up and to review lab results  JOSEFINA Wilson       HPI:     Patient is doing well with she is feeling fatigued  Patient also has redness about the nose in the main lobar area  Patient's mother did have rosacea  The following portions of the patient's history were reviewed and updated as appropriate: allergies, current medications, past family history, past medical history, past social history, past surgical history and problem list       Review of Systems   Constitutional:          HPI   HENT: Negative  Eyes: Negative  Respiratory: Negative  Cardiovascular: Negative  Gastrointestinal: Negative  Endocrine:         History of hypothyroidism, on no medications   Genitourinary: Negative  Musculoskeletal: Negative  Skin:         HPI   Allergic/Immunologic: Negative  Neurological: Negative  Hematological: Negative  Psychiatric/Behavioral: Negative  Data to review:       Objective:    Vitals:    09/24/19 1448   BP: 136/80   BP Location: Left arm   Patient Position: Sitting   Cuff Size: Large   Pulse: 72   Resp: 16   Weight: 83 9 kg (185 lb)   Height: 5' 10 5" (1 791 m)        Physical Exam   Constitutional: She is oriented to person, place, and time  She appears well-developed and well-nourished  HENT:   Head: Normocephalic and atraumatic  Mouth/Throat: Oropharynx is clear and moist    Eyes: Pupils are equal, round, and reactive to light   Conjunctivae and EOM are normal  No scleral icterus  Neck: Neck supple  No JVD present  Cardiovascular: Normal rate, regular rhythm and normal heart sounds  Pulmonary/Chest: Effort normal and breath sounds normal    Abdominal: Soft  Bowel sounds are normal  There is no tenderness  Musculoskeletal: She exhibits no edema  Neurological: She is alert and oriented to person, place, and time  Skin: Skin is warm and dry  Malar and nasal erythema with mild telangiectasias   Psychiatric: She has a normal mood and affect  BMI Counseling: Body mass index is 26 17 kg/m²  The BMI is above normal  Nutrition recommendations include reducing intake of cholesterol

## 2019-09-24 NOTE — PATIENT INSTRUCTIONS
Start levothyroxine 25 mcg daily  Use Metrogel twice daily to face  Return in 4 weeks  Complete blood work in 3 weeks

## 2019-09-24 NOTE — PROGRESS NOTES
Assessment and Plan:     Problem List Items Addressed This Visit     None           Preventive health issues were discussed with patient, and age appropriate screening tests were ordered as noted in patient's After Visit Summary  Personalized health advice and appropriate referrals for health education or preventive services given if needed, as noted in patient's After Visit Summary       History of Present Illness:     Patient presents for Medicare Annual Wellness visit    Patient Care Team:  Ha Rogers DO as PCP - Domingo Zavala MD     Problem List:     Patient Active Problem List   Diagnosis    Allergic rhinitis    Bilateral cataracts    Chronic obstructive pulmonary disease ([address] )    Hyperlipidemia    Hypothyroidism    Osteoarthritis    Eustachian tube dysfunction      Past Medical and Surgical History:     Past Medical History:   Diagnosis Date    Cataract     History of total hysterectomy      Past Surgical History:   Procedure Laterality Date    CATARACT EXTRACTION, BILATERAL      COLONOSCOPY      HYSTERECTOMY      REVISION COLOSTOMY  1976    SHOULDER SURGERY  2006    S/P dislocated shoulder      Family History:     Family History   Problem Relation Age of Onset   Eva Samuel Breast cancer Mother     Hypertension Mother     Alcohol abuse Father     Drug abuse Sister       Social History:     Social History     Socioeconomic History    Marital status: Single     Spouse name: None    Number of children: None    Years of education: None    Highest education level: None   Occupational History    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   Social Needs    Financial resource strain: None    Food insecurity:     Worry: None     Inability: None    Transportation needs:     Medical: None     Non-medical: None   Tobacco Use    Smoking status: Former Smoker     Packs/day: 3 00     Years: 15 00     Pack years: 45 00     Types: Cigarettes     Start date: 1969     Last attempt to quit: 1983     Years since quittin 7    Smokeless tobacco: Never Used   Substance and Sexual Activity    Alcohol use: Not Currently     Comment: per [employer]-stopped drinking alcohol    Drug use: No     Comment: per [employer]-recovering from drug addiction    Sexual activity: Not Currently   Lifestyle    Physical activity:     Days per week: None     Minutes per session: None    Stress: None   Relationships    Social connections:     Talks on phone: None     Gets together: None     Attends [employer] service: None     Active member of club or organization: None     Attends meetings of clubs or organizations: None     Relationship status: None    Intimate partner violence:     Fear of current or ex partner: None     Emotionally abused: None     Physically abused: None     Forced sexual activity: None   Other Topics Concern    None   Social History Narrative    Daily coffee consumption 3 cups per day       Medications and Allergies:     Current Outpatient Medications   Medication Sig Dispense Refill    fluticasone (FLONASE) 50 mcg/act nasal spray 2 sprays into each nostril daily      ipratropium (ATROVENT) 0 03 % nasal spray USE 2 SPRAYS BOTH NOSTRILS 3 TIMES A DAY AS NEEDED 30 mL 4    loratadine-pseudoephedrine (CLARITIN-D 24 HOUR)  mg per 24 hr tablet Take 1 tablet by mouth daily      meloxicam (MOBIC) 15 mg tablet Take 1 tablet by mouth      montelukast (SINGULAIR) 10 mg tablet TAKE 1 TABLET DAILY 90 tablet 1    PREMARIN 0 45 MG tablet TAKE 1 TABLET (0 45 MG TOTAL) BY MOUTH DAILY  FOR 21 DAYS, THEN 7 DAYS OFF  6    RESTASIS 0 05 % ophthalmic emulsion       SYMBICORT 160-4 5 MCG/ACT inhaler Inhale 2 puffs 2 (two) times a day Rinse mouth after use  3 Inhaler 3    tiotropium (SPIRIVA HANDIHALER) 18 mcg inhalation capsule Place 1 capsule (18 mcg total) into inhaler and inhale daily 90 capsule 3     No current facility-administered medications for this visit        Allergies   Allergen Reactions  Alcohol     Other      nicotics        Immunizations:     Immunization History   Administered Date(s) Administered    INFLUENZA 09/21/2017    Influenza Quadrivalent Preservative Free ID 09/21/2017    Tuberculin Skin Test-PPD Intradermal 04/17/2013      Health Maintenance:         Topic Date Due    MAMMOGRAM  1955    Hepatitis C Screening  10/24/2021 (Originally 1955)    CRC Screening: Colonoscopy  01/31/2024         Topic Date Due    Pneumococcal Vaccine: 65+ Years (1 of 2 - PCV13) 08/03/2018    INFLUENZA VACCINE  07/01/2019      Medicare Health Risk Assessment:     /80 (BP Location: Left arm, Patient Position: Sitting, Cuff Size: Large)   Pulse 72   Resp 16   Ht 5' 10 5" (1 791 m)   Wt 83 9 kg (185 lb)   BMI 26 17 kg/m²          Health Risk Assessment:   Patient rates overall health as very good  Patient feels that their physical health rating is same  Eyesight was rated as same  Hearing was rated as same  Patient feels that their emotional and mental health rating is same  Pain experienced in the last 7 days has been none  Patient states that she has experienced no weight loss or gain in last 6 months  Fall Risk Screening: In the past year, patient has experienced: no history of falling in past year      Urinary Incontinence Screening:   Patient has not leaked urine accidently in the last six months  Home Safety:  Patient does not have trouble with stairs inside or outside of their home  Patient has working smoke alarms and has no working carbon monoxide detector  Home safety hazards include: none  Nutrition:   Current diet is Regular and Other (please comment)  Medications:   Patient is currently taking over-the-counter supplements  OTC medications include: see medication list  Patient is able to manage medications       Activities of Daily Living (ADLs)/Instrumental Activities of Daily Living (IADLs):   Walk and transfer into and out of bed and chair?: Yes  Dress and groom yourself?: Yes    Bathe or shower yourself?: Yes    Feed yourself?  Yes  Do your laundry/housekeeping?: Yes  Manage your money, pay your bills and track your expenses?: Yes  Make your own meals?: Yes    Do your own shopping?: Yes    Previous Hospitalizations:   Any hospitalizations or ED visits within the last 12 months?: No      Advance Care Planning:   Living will: No    Durable POA for healthcare: No    Advanced directive: No    Advanced directive counseling given: Yes    Five wishes given: Yes    Patient declined ACP directive: No    End of Life Decisions reviewed with patient: Yes    Provider agrees with end of life decisions: No      Cognitive Screening:   Provider or family/friend/caregiver concerned regarding cognition?: No    PREVENTIVE SCREENINGS      Cardiovascular Screening:    General: Screening Not Indicated and History Lipid Disorder      Diabetes Screening:     General: Screening Current      Colorectal Cancer Screening:     General: Screening Current      Cervical Cancer Screening:    General: Screening Not Indicated      Osteoporosis Screening:    General: Screening Current      Abdominal Aortic Aneurysm (AAA) Screening:        General: Screening Not Indicated      Lung Cancer Screening:     General: Screening Not Indicated      Hepatitis C Screening:    General: Screening Current and Patient Declines      Dewayne Paiz,

## 2019-10-25 ENCOUNTER — APPOINTMENT (OUTPATIENT)
Dept: LAB | Facility: CLINIC | Age: 64
End: 2019-10-25
Payer: MEDICARE

## 2019-10-25 DIAGNOSIS — E03.9 HYPOTHYROIDISM, UNSPECIFIED TYPE: ICD-10-CM

## 2019-10-25 DIAGNOSIS — L30.9 FACIAL DERMATITIS: ICD-10-CM

## 2019-10-25 LAB
T4 FREE SERPL-MCNC: 1 NG/DL (ref 0.76–1.46)
T4 SERPL-MCNC: 10.9 UG/DL (ref 4.7–13.3)
TSH SERPL DL<=0.05 MIU/L-ACNC: 5.22 UIU/ML (ref 0.36–3.74)

## 2019-10-25 PROCEDURE — 84443 ASSAY THYROID STIM HORMONE: CPT

## 2019-10-25 PROCEDURE — 84439 ASSAY OF FREE THYROXINE: CPT

## 2019-10-25 PROCEDURE — 86038 ANTINUCLEAR ANTIBODIES: CPT

## 2019-10-25 PROCEDURE — 36415 COLL VENOUS BLD VENIPUNCTURE: CPT

## 2019-10-28 LAB — RYE IGE QN: NEGATIVE

## 2019-11-01 ENCOUNTER — OFFICE VISIT (OUTPATIENT)
Dept: FAMILY MEDICINE CLINIC | Facility: CLINIC | Age: 64
End: 2019-11-01
Payer: MEDICARE

## 2019-11-01 VITALS
DIASTOLIC BLOOD PRESSURE: 78 MMHG | HEIGHT: 71 IN | HEART RATE: 74 BPM | SYSTOLIC BLOOD PRESSURE: 130 MMHG | WEIGHT: 184.6 LBS | OXYGEN SATURATION: 96 % | BODY MASS INDEX: 25.84 KG/M2 | TEMPERATURE: 98 F | RESPIRATION RATE: 16 BRPM

## 2019-11-01 DIAGNOSIS — J01.40 ACUTE PANSINUSITIS, RECURRENCE NOT SPECIFIED: ICD-10-CM

## 2019-11-01 DIAGNOSIS — H69.80 DYSFUNCTION OF EUSTACHIAN TUBE, UNSPECIFIED LATERALITY: ICD-10-CM

## 2019-11-01 DIAGNOSIS — J30.9 ALLERGIC RHINITIS, UNSPECIFIED SEASONALITY, UNSPECIFIED TRIGGER: ICD-10-CM

## 2019-11-01 DIAGNOSIS — E03.9 HYPOTHYROIDISM, UNSPECIFIED TYPE: Primary | ICD-10-CM

## 2019-11-01 DIAGNOSIS — E66.3 OVERWEIGHT (BMI 25.0-29.9): ICD-10-CM

## 2019-11-01 DIAGNOSIS — E78.5 HYPERLIPIDEMIA, UNSPECIFIED HYPERLIPIDEMIA TYPE: ICD-10-CM

## 2019-11-01 DIAGNOSIS — J44.9 CHRONIC OBSTRUCTIVE PULMONARY DISEASE, UNSPECIFIED COPD TYPE (HCC): ICD-10-CM

## 2019-11-01 PROCEDURE — 99214 OFFICE O/P EST MOD 30 MIN: CPT | Performed by: FAMILY MEDICINE

## 2019-11-01 RX ORDER — CEPHALEXIN 500 MG/1
500 CAPSULE ORAL EVERY 8 HOURS SCHEDULED
Qty: 30 CAPSULE | Refills: 0 | Status: SHIPPED | OUTPATIENT
Start: 2019-11-01 | End: 2019-11-11

## 2019-11-01 NOTE — PROGRESS NOTES
Assessment and Plan:  1  Hypothyroidism, stable continue levothyroxine 25 mcg   2  Allergic rhinitis/eustachian tube dysfunction, stable continue present therapy  3  COPD, stable follow up Pulmonary Medicine  4  Hyperlipidemia blood work ordered  5  Sinusitis, Keflex ordered  6  BMI 26 11 discussed  Patient did lose 1 lb   7  Patient to return in 6 months for office visit blood work sooner if need          Problem List Items Addressed This Visit        Endocrine    Hypothyroidism - Primary      T4 is normal TSH has improved will continue levothyroxine 25 mcg daily         Relevant Orders    CBC    Comprehensive metabolic panel    Lipid Panel with Direct LDL reflex    T4    TSH, 3rd generation with Free T4 reflex    Urinalysis with reflex to microscopic       Respiratory    Allergic rhinitis      Continue present therapy         Relevant Orders    CBC    Comprehensive metabolic panel    Lipid Panel with Direct LDL reflex    T4    TSH, 3rd generation with Free T4 reflex    Urinalysis with reflex to microscopic    Chronic obstructive pulmonary disease (HCC)      Stable continue present therapy follow-up with pulmonology         Relevant Orders    CBC    Comprehensive metabolic panel    Lipid Panel with Direct LDL reflex    T4    TSH, 3rd generation with Free T4 reflex    Urinalysis with reflex to microscopic       Nervous and Auditory    Eustachian tube dysfunction      Stable continue present therapy         Relevant Orders    CBC    Comprehensive metabolic panel    Lipid Panel with Direct LDL reflex    T4    TSH, 3rd generation with Free T4 reflex    Urinalysis with reflex to microscopic       Other    Hyperlipidemia      Blood work ordered         Relevant Orders    CBC    Comprehensive metabolic panel    Lipid Panel with Direct LDL reflex    T4    TSH, 3rd generation with Free T4 reflex    Urinalysis with reflex to microscopic    Overweight (BMI 25 0-29  9)      BMI 26 11 discussed patient did lose 1 lb continue weight loss is recommended           Other Visit Diagnoses     Acute pansinusitis, recurrence not specified        Relevant Medications    cephalexin (KEFLEX) 500 mg capsule    Other Relevant Orders    CBC    Comprehensive metabolic panel    Lipid Panel with Direct LDL reflex    T4    TSH, 3rd generation with Free T4 reflex    Urinalysis with reflex to microscopic                 Diagnoses and all orders for this visit:    Hypothyroidism, unspecified type  -     CBC; Future  -     Comprehensive metabolic panel; Future  -     Lipid Panel with Direct LDL reflex; Future  -     T4; Future  -     TSH, 3rd generation with Free T4 reflex; Future  -     Urinalysis with reflex to microscopic; Future    Allergic rhinitis, unspecified seasonality, unspecified trigger  -     CBC; Future  -     Comprehensive metabolic panel; Future  -     Lipid Panel with Direct LDL reflex; Future  -     T4; Future  -     TSH, 3rd generation with Free T4 reflex; Future  -     Urinalysis with reflex to microscopic; Future    Chronic obstructive pulmonary disease, unspecified COPD type ([facility] [address] )  -     CBC; Future  -     Comprehensive metabolic panel; Future  -     Lipid Panel with Direct LDL reflex; Future  -     T4; Future  -     TSH, 3rd generation with Free T4 reflex; Future  -     Urinalysis with reflex to microscopic; Future    Dysfunction of Eustachian tube, unspecified laterality  -     CBC; Future  -     Comprehensive metabolic panel; Future  -     Lipid Panel with Direct LDL reflex; Future  -     T4; Future  -     TSH, 3rd generation with Free T4 reflex; Future  -     Urinalysis with reflex to microscopic; Future    Hyperlipidemia, unspecified hyperlipidemia type  -     CBC; Future  -     Comprehensive metabolic panel; Future  -     Lipid Panel with Direct LDL reflex; Future  -     T4; Future  -     TSH, 3rd generation with Free T4 reflex; Future  -     Urinalysis with reflex to microscopic;  Future    Acute pansinusitis, recurrence not specified  -     cephalexin (KEFLEX) 500 mg capsule; Take 1 capsule (500 mg total) by mouth every 8 (eight) hours for 10 days  -     CBC; Future  -     Comprehensive metabolic panel; Future  -     Lipid Panel with Direct LDL reflex; Future  -     T4; Future  -     TSH, 3rd generation with Free T4 reflex; Future  -     Urinalysis with reflex to microscopic; Future    Overweight (BMI 25 0-29  9)              Subjective:      Patient ID: Noreen Monzon is a 75 y o  female  CC:    Chief Complaint   Patient presents with    Follow-up     pt is here for a 2 month follow up    Results     pt is here to review test results    Facial Pain     pt says she is having a problem with  her sinuses, she has a headache and tooth pain       HPI:     Patient does feel more energetic since starting the thyroid  Blood work was discussed  Patient did lose 1 lb  Patient states past 3-4 days she is having increasing sinus pain and pressure she is using all her allergy medication as ordered no fever chills cough or wheeze      The following portions of the patient's history were reviewed and updated as appropriate: allergies, current medications, past family history, past medical history, past social history, past surgical history and problem list       Review of Systems   Constitutional:         HPI   HENT:         HPI   Eyes: Negative  Respiratory: Negative  Cardiovascular: Negative  Gastrointestinal: Negative  Endocrine:         HPI   Genitourinary: Negative  Musculoskeletal: Negative  Skin: Negative  Allergic/Immunologic: Positive for environmental allergies  Neurological: Negative  Hematological: Negative  Psychiatric/Behavioral: Negative            Data to review:       Objective:    Vitals:    11/01/19 1053   BP: 130/78   BP Location: Left arm   Patient Position: Sitting   Cuff Size: Adult   Pulse: 74   Resp: 16   Temp: 98 °F (36 7 °C)   TempSrc: Temporal   SpO2: 96%   Weight: 83 7 kg (184 lb 9 6 oz)   Height: 5' 10 5" (1 791 m)        Physical Exam   Constitutional: She is oriented to person, place, and time  She appears well-developed and well-nourished  HENT:   Head: Normocephalic and atraumatic  Right Ear: External ear normal    Left Ear: External ear normal    Mouth/Throat: No oropharyngeal exudate  Positive mildly allergic turbinates positive pansinus tenderness to percussion purulent postnasal drip minimal pharyngeal injection negative exudate   Eyes: Pupils are equal, round, and reactive to light  Conjunctivae and EOM are normal  No scleral icterus  Neck: Neck supple  No thyromegaly present  Cardiovascular: Normal rate, regular rhythm and normal heart sounds  Pulmonary/Chest: Effort normal and breath sounds normal    Abdominal: Soft  Bowel sounds are normal  There is no tenderness  Musculoskeletal: She exhibits no edema  Lymphadenopathy:     She has cervical adenopathy  Neurological: She is alert and oriented to person, place, and time  No cranial nerve deficit  Skin: Skin is warm and dry  Psychiatric: She has a normal mood and affect  BMI Counseling: Body mass index is 26 11 kg/m²  The BMI is above normal  Nutrition recommendations include reducing intake of cholesterol

## 2019-11-01 NOTE — PATIENT INSTRUCTIONS
Return in 1 week if still symptoms  Return in 6 months for office visit blood work sooner if needed

## 2019-11-22 DIAGNOSIS — J30.9 ALLERGIC RHINITIS: ICD-10-CM

## 2019-11-22 RX ORDER — MONTELUKAST SODIUM 10 MG/1
TABLET ORAL
Qty: 90 TABLET | Refills: 1 | Status: SHIPPED | OUTPATIENT
Start: 2019-11-22 | End: 2020-06-01

## 2020-01-29 ENCOUNTER — OFFICE VISIT (OUTPATIENT)
Dept: FAMILY MEDICINE CLINIC | Facility: CLINIC | Age: 65
End: 2020-01-29
Payer: MEDICARE

## 2020-01-29 VITALS
HEIGHT: 71 IN | HEART RATE: 76 BPM | SYSTOLIC BLOOD PRESSURE: 128 MMHG | WEIGHT: 179 LBS | DIASTOLIC BLOOD PRESSURE: 70 MMHG | BODY MASS INDEX: 25.06 KG/M2 | TEMPERATURE: 98.2 F

## 2020-01-29 DIAGNOSIS — J30.9 ALLERGIC RHINITIS, UNSPECIFIED SEASONALITY, UNSPECIFIED TRIGGER: Primary | ICD-10-CM

## 2020-01-29 DIAGNOSIS — Z80.3 FAMILY HISTORY OF BREAST CANCER IN MOTHER: ICD-10-CM

## 2020-01-29 DIAGNOSIS — E66.3 OVERWEIGHT (BMI 25.0-29.9): ICD-10-CM

## 2020-01-29 DIAGNOSIS — H66.002 ACUTE SUPPURATIVE OTITIS MEDIA OF LEFT EAR WITHOUT SPONTANEOUS RUPTURE OF TYMPANIC MEMBRANE, RECURRENCE NOT SPECIFIED: ICD-10-CM

## 2020-01-29 DIAGNOSIS — J44.9 CHRONIC OBSTRUCTIVE PULMONARY DISEASE, UNSPECIFIED COPD TYPE (HCC): ICD-10-CM

## 2020-01-29 DIAGNOSIS — Z12.31 SCREENING MAMMOGRAM FOR HIGH-RISK PATIENT: ICD-10-CM

## 2020-01-29 DIAGNOSIS — Z00.00 HEALTHCARE MAINTENANCE: ICD-10-CM

## 2020-01-29 DIAGNOSIS — Z23 NEED FOR INFLUENZA VACCINATION: ICD-10-CM

## 2020-01-29 DIAGNOSIS — H69.80 DYSFUNCTION OF EUSTACHIAN TUBE, UNSPECIFIED LATERALITY: ICD-10-CM

## 2020-01-29 PROCEDURE — 90686 IIV4 VACC NO PRSV 0.5 ML IM: CPT

## 2020-01-29 PROCEDURE — 99214 OFFICE O/P EST MOD 30 MIN: CPT | Performed by: FAMILY MEDICINE

## 2020-01-29 PROCEDURE — G0008 ADMIN INFLUENZA VIRUS VAC: HCPCS

## 2020-01-29 PROCEDURE — 1123F ACP DISCUSS/DSCN MKR DOCD: CPT

## 2020-01-29 PROCEDURE — G0438 PPPS, INITIAL VISIT: HCPCS | Performed by: FAMILY MEDICINE

## 2020-01-29 RX ORDER — AZITHROMYCIN 250 MG/1
TABLET, FILM COATED ORAL
Qty: 6 TABLET | Refills: 0 | Status: SHIPPED | OUTPATIENT
Start: 2020-01-29 | End: 2020-02-03

## 2020-01-29 NOTE — PROGRESS NOTES
Assessment and Plan:  1  Allergic rhinitis/eustachian tube dysfunction, continue present therapy  2  Acute otitis media/earache, Z-Bernard prescribed  3  BMI 25 32 discussed patient did lose 5 lb is can continue try to lose weight  4  COPD, stable lungs are clear follows with pulmonology  5  Screening mammography/breast cancer in mother  Mammography is ordered  6  Return in 1 week if still symptoms  7  Health care maintenance, a [address] [place] [address] completed      Problem List Items Addressed This Visit        Respiratory    Allergic rhinitis - Primary     Continue present therapy         Chronic obstructive pulmonary disease ([address] )     Lungs clear follow-up pulmonology            Nervous and Auditory    Eustachian tube dysfunction     Continue present therapy            Other    Healthcare maintenance     A [place] was completed         Overweight (BMI 25 0-29  9)     BMI 25 32 discussed patient lost 5 lb patient continues to try to lose weight         Family history of breast cancer in mother     Mammography ordered           Other Visit Diagnoses     Acute suppurative otitis media of left ear without spontaneous rupture of tympanic membrane, recurrence not specified        Relevant Medications    azithromycin (ZITHROMAX) 250 mg tablet                 Diagnoses and all orders for this visit:    Allergic rhinitis, unspecified seasonality, unspecified trigger    Chronic obstructive pulmonary disease, unspecified COPD type ([address] )    Dysfunction of Eustachian tube, unspecified laterality    Overweight (BMI 25 0-29  9)    Family history of breast cancer in mother    Healthcare maintenance    Acute suppurative otitis media of left ear without spontaneous rupture of tympanic membrane, recurrence not specified  -     azithromycin (ZITHROMAX) 250 mg tablet; Take 2 tablets today then 1 tablet daily till finished              Subjective:      Patient ID: Shay Mclain is a 75 y o  female      CC:    Chief Complaint   Patient presents with   Yareli Alegre Impaction     patient c/o left ear blockage x 1-2 weeks  patient used OTC debrox, heating pad with no relief  Patient c/o sinus pressure  ak       HPI:    Patient had mild pressure/pain sensation left ear  Negative otorrhea  Patient use Debrox last week  Still feels same  Patient mild head congestion no fever chills no night sweats  The following portions of the patient's history were reviewed and updated as appropriate: allergies, current medications, past family history, past medical history, past social history, past surgical history and problem list       Review of Systems   Constitutional:        HPI   HENT:        HPI   Eyes: Negative  Respiratory: Negative  Cardiovascular: Negative  Gastrointestinal: Negative  Endocrine: Negative  Genitourinary: Negative  Musculoskeletal: Negative  Skin: Negative  Allergic/Immunologic: Positive for environmental allergies  Patient using all of her allergy medication as ordered   Neurological: Negative  Hematological: Negative  Psychiatric/Behavioral: Negative  Data to review:       Objective:    Vitals:    01/29/20 1434   BP: 128/70   Pulse: 76   Temp: 98 2 °F (36 8 °C)   Weight: 81 2 kg (179 lb)   Height: 5' 10 5" (1 791 m)        Physical Exam   Constitutional: She is oriented to person, place, and time  She appears well-developed and well-nourished  HENT:   Head: Normocephalic and atraumatic  Mouth/Throat: No oropharyngeal exudate  Right TM dull without injection left TM dull with injection positive allergic turbinates negative sinus tenderness to percussion scant clear postnasal drip negative pharyngeal injection or exudate   Eyes: Pupils are equal, round, and reactive to light  Conjunctivae and EOM are normal  No scleral icterus  Neck: Neck supple  Cardiovascular: Normal rate, regular rhythm and normal heart sounds     Pulmonary/Chest: Effort normal and breath sounds normal    Lymphadenopathy:     She has cervical adenopathy  Neurological: She is alert and oriented to person, place, and time  No cranial nerve deficit  Skin: Skin is warm and dry  Psychiatric: She has a normal mood and affect  BMI Counseling: Body mass index is 25 32 kg/m²  The BMI is above normal  Nutrition recommendations include moderation in carbohydrate intake and reducing intake of cholesterol  Exercise recommendations include exercising 3-5 times per week

## 2020-01-29 NOTE — PROGRESS NOTES
Assessment and Plan:     Problem List Items Addressed This Visit     None           Preventive health issues were discussed with patient, and age appropriate screening tests were ordered as noted in patient's After Visit Summary  Personalized health advice and appropriate referrals for health education or preventive services given if needed, as noted in patient's After Visit Summary  History of Present Illness:     Patient presents for Medicare Annual Wellness visit    Patient Care Team:  Reyna Us DO as PCP - Annabel Landau, MD     Problem List:     Patient Active Problem List   Diagnosis    Allergic rhinitis    Bilateral cataracts    Chronic obstructive pulmonary disease ([address] )    Hyperlipidemia    Hypothyroidism    Osteoarthritis    Eustachian tube dysfunction    Rosacea    Healthcare maintenance    Overweight (BMI 25 0-29  9)      Past Medical and Surgical History:     Past Medical History:   Diagnosis Date    Cataract     History of total hysterectomy      Past Surgical History:   Procedure Laterality Date    CATARACT EXTRACTION, BILATERAL      COLONOSCOPY      HYSTERECTOMY      REVISION COLOSTOMY  1976    SHOULDER SURGERY  2006    S/P dislocated shoulder      Family History:     Family History   Problem Relation Age of Onset   [address] Breast cancer Mother     Hypertension Mother     Alcohol abuse Father     Drug abuse Sister       Social History:     Social History     Socioeconomic History    Marital status: Single     Spouse name: None    Number of children: None    Years of education: None    Highest education level: None   Occupational History    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   Social Needs    Financial resource strain: None    Food insecurity:     Worry: None     Inability: None    Transportation needs:     Medical: None     Non-medical: None   Tobacco Use    Smoking status: Former Smoker     Packs/day: 3 00     Years: 15 00     Pack years: 45 00     Types: Cigarettes     Start date: 54     Last attempt to quit:      Years since quittin 1    Smokeless tobacco: Never Used   Substance and Sexual Activity    Alcohol use: Not Currently     Comment: per [employer]-stopped drinking alcohol    Drug use: No     Comment: per [employer]-recovering from drug addiction    Sexual activity: Not Currently   Lifestyle    Physical activity:     Days per week: None     Minutes per session: None    Stress: None   Relationships    Social connections:     Talks on phone: None     Gets together: None     Attends [employer] service: None     Active member of club or organization: None     Attends meetings of clubs or organizations: None     Relationship status: None    Intimate partner violence:     Fear of current or ex partner: None     Emotionally abused: None     Physically abused: None     Forced sexual activity: None   Other Topics Concern    None   Social History Narrative    Daily coffee consumption 3 cups per day       Medications and Allergies:     Current Outpatient Medications   Medication Sig Dispense Refill    fluticasone (FLONASE) 50 mcg/act nasal spray 2 sprays into each nostril daily      ipratropium (ATROVENT) 0 03 % nasal spray USE 2 SPRAYS BOTH NOSTRILS 3 TIMES A DAY AS NEEDED 30 mL 4    levothyroxine 25 mcg tablet Take 1 tablet (25 mcg total) by mouth daily in the early morning 30 tablet 5    loratadine-pseudoephedrine (CLARITIN-D 24 HOUR)  mg per 24 hr tablet Take 1 tablet by mouth daily      meloxicam (MOBIC) 15 mg tablet Take 1 tablet by mouth      metroNIDAZOLE (METROGEL) 0 75 % gel Apply to face twice daily for rosacea 45 g 1    montelukast (SINGULAIR) 10 mg tablet TAKE 1 TABLET DAILY 90 tablet 1    PREMARIN 0 45 MG tablet TAKE 1 TABLET (0 45 MG TOTAL) BY MOUTH DAILY   FOR 21 DAYS, THEN 7 DAYS OFF  6    RESTASIS 0 05 % ophthalmic emulsion       SYMBICORT 160-4 5 MCG/ACT inhaler Inhale 2 puffs 2 (two) times a day Rinse mouth after use  3 Inhaler 3    tiotropium (SPIRIVA HANDIHALER) 18 mcg inhalation capsule Place 1 capsule (18 mcg total) into inhaler and inhale daily 90 capsule 3     No current facility-administered medications for this visit  Allergies   Allergen Reactions    Alcohol     Other      narcotics        Immunizations:     Immunization History   Administered Date(s) Administered    INFLUENZA 09/21/2017    Influenza Quadrivalent Preservative Free ID 09/21/2017    Pneumococcal Polysaccharide PPV23 09/24/2019    Tuberculin Skin Test-PPD Intradermal 04/17/2013      Health Maintenance:         Topic Date Due    Hepatitis C Screening  1955    MAMMOGRAM  1955    CRC Screening: Colonoscopy  01/31/2024         Topic Date Due    Influenza Vaccine  07/01/2019      Medicare Health Risk Assessment:     /70   Pulse 76   Temp 98 2 °F (36 8 °C)   Ht 5' 10 5" (1 791 m)   Wt 81 2 kg (179 lb)   Breastfeeding No   BMI 25 32 kg/m²      Corey Stokes is here for her Initial Wellness visit  Health Risk Assessment:   Patient rates overall health as very good  Patient feels that their physical health rating is same  Eyesight was rated as same  Hearing was rated as same  Patient feels that their emotional and mental health rating is same  Pain experienced in the last 7 days has been none  Patient states that she has experienced no weight loss or gain in last 6 months  Fall Risk Screening: In the past year, patient has experienced: no history of falling in past year      Urinary Incontinence Screening:   Patient has not leaked urine accidently in the last six months  Home Safety:  Patient does not have trouble with stairs inside or outside of their home  Patient has working smoke alarms and has working carbon monoxide detector  Home safety hazards include: none  Nutrition:   Current diet is Regular  Medications:   Patient is able to manage medications       Activities of Daily Living (ADLs)/Instrumental Activities of Daily Living (IADLs):   Walk and transfer into and out of bed and chair?: Yes  Dress and groom yourself?: Yes    Bathe or shower yourself?: Yes    Feed yourself?  Yes  Do your laundry/housekeeping?: Yes  Manage your money, pay your bills and track your expenses?: Yes  Make your own meals?: Yes    Do your own shopping?: Yes    Previous Hospitalizations:   Any hospitalizations or ED visits within the last 12 months?: No      Advance Care Planning:   Living will: No    Durable POA for healthcare: No    Advanced directive: No    Advanced directive counseling given: Yes    Five wishes given: Yes    Patient declined ACP directive: No    End of Life Decisions reviewed with patient: Yes    Provider agrees with end of life decisions: Yes      Cognitive Screening:   Provider or family/friend/caregiver concerned regarding cognition?: No    PREVENTIVE SCREENINGS      Cardiovascular Screening:    General: Screening Not Indicated and History Lipid Disorder      Diabetes Screening:     General: Screening Current      Colorectal Cancer Screening:     General: Screening Current      Cervical Cancer Screening:    General: Screening Not Indicated      Lung Cancer Screening:     General: Screening Current      Hepatitis C Screening:    General: Patient Declines    Hep C Screening Accepted: Zeinab Isaac DO

## 2020-03-09 ENCOUNTER — OFFICE VISIT (OUTPATIENT)
Dept: FAMILY MEDICINE CLINIC | Facility: CLINIC | Age: 65
End: 2020-03-09
Payer: MEDICARE

## 2020-03-09 VITALS
WEIGHT: 178.8 LBS | HEART RATE: 72 BPM | HEIGHT: 71 IN | SYSTOLIC BLOOD PRESSURE: 122 MMHG | BODY MASS INDEX: 25.03 KG/M2 | DIASTOLIC BLOOD PRESSURE: 70 MMHG

## 2020-03-09 DIAGNOSIS — J44.9 CHRONIC OBSTRUCTIVE PULMONARY DISEASE, UNSPECIFIED COPD TYPE (HCC): ICD-10-CM

## 2020-03-09 DIAGNOSIS — J01.90 ACUTE NON-RECURRENT SINUSITIS, UNSPECIFIED LOCATION: Primary | ICD-10-CM

## 2020-03-09 PROCEDURE — 4040F PNEUMOC VAC/ADMIN/RCVD: CPT | Performed by: FAMILY MEDICINE

## 2020-03-09 PROCEDURE — 99213 OFFICE O/P EST LOW 20 MIN: CPT | Performed by: FAMILY MEDICINE

## 2020-03-09 PROCEDURE — 3008F BODY MASS INDEX DOCD: CPT | Performed by: FAMILY MEDICINE

## 2020-03-09 PROCEDURE — 1036F TOBACCO NON-USER: CPT | Performed by: FAMILY MEDICINE

## 2020-03-09 PROCEDURE — 1160F RVW MEDS BY RX/DR IN RCRD: CPT | Performed by: FAMILY MEDICINE

## 2020-03-09 RX ORDER — AZITHROMYCIN 500 MG/1
500 TABLET, FILM COATED ORAL DAILY
Qty: 3 TABLET | Refills: 0 | Status: SHIPPED | OUTPATIENT
Start: 2020-03-09 | End: 2020-03-12

## 2020-03-09 NOTE — ASSESSMENT & PLAN NOTE
The patient was placed on Zithromax 500 mg 1 daily for 3 days  She already has a cough preparation that she takes at bedtime  And she takes call Claritin D 24 hours 1 daily every day

## 2020-03-09 NOTE — PROGRESS NOTES
Assessment and Plan:  Follow up if not better  Problem List Items Addressed This Visit        Respiratory    Chronic obstructive pulmonary disease ([facility] [address] )     Stable at the present time  Acute non-recurrent sinusitis - Primary     The patient was placed on Zithromax 500 mg 1 daily for 3 days  She already has a cough preparation that she takes at bedtime  And she takes call Claritin D 24 hours 1 daily every day  Relevant Medications    azithromycin (ZITHROMAX) 500 MG tablet                 Diagnoses and all orders for this visit:    Acute non-recurrent sinusitis, unspecified location  -     azithromycin (ZITHROMAX) 500 MG tablet; Take 1 tablet (500 mg total) by mouth daily for 3 days    Chronic obstructive pulmonary disease, unspecified COPD type (HCC)              Subjective:      Patient ID: Jenny Means is a 75 y o  female  CC:    Chief Complaint   Patient presents with    Cold Like Symptoms     Pt c/o head congestion, PND, sore throat and cough  Pt states she has COPD and when it moves into her throat she is afraid it will settle in her chest with SOB, etc  kw    Cough    Sore Throat       HPI:    This is a 68-year-old female who comes in with symptoms of head congestion, postnasal drip and sore throat  Symptoms began several weeks ago and have been off and on bothering her  However recently the symptoms have been getting worse and being more constant and that is why she sought help  She also has a cough that could keep her awake at night but during the day the cough is not severe  She denies any nausea, vomiting or diarrhea  Her blood pressure is 122/70 and her weight is down 1 lb from the previous visit to 178 lb  Her BMI is 25 2        The following portions of the patient's history were reviewed and updated as appropriate: allergies, current medications, past family history, past medical history, past social history, past surgical history and problem list       Review of Systems Constitutional: Negative  Negative for chills, fatigue and fever  HENT: Positive for congestion, postnasal drip and sinus pressure  Negative for ear pain, rhinorrhea and sore throat  Eyes: Negative  Respiratory: Positive for cough  Negative for chest tightness and wheezing  Cardiovascular: Negative  Negative for chest pain  Gastrointestinal: Negative  Negative for diarrhea, nausea and vomiting  Endocrine: Negative  Genitourinary: Negative  Musculoskeletal: Negative  Skin: Negative  Allergic/Immunologic: Negative  Neurological: Negative  Hematological: Negative  Psychiatric/Behavioral: Negative  Data to review:       Objective:    Vitals:    03/09/20 1336   BP: 122/70   BP Location: Left arm   Patient Position: Sitting   Pulse: 72   Weight: 81 1 kg (178 lb 12 8 oz)   Height: 5' 10 5" (1 791 m)        Physical Exam   Constitutional: She is oriented to person, place, and time  She appears well-developed and well-nourished  HENT:   Head: Normocephalic  Right Ear: External ear normal    Left Ear: External ear normal    Mouth/Throat: Oropharynx is clear and moist  No oropharyngeal exudate  Tympanic membranes dull bilaterally without injection or erythema  Positive postnasal drip, +1 erythema of posterior pharynx without exudates  Eyes: Pupils are equal, round, and reactive to light  Conjunctivae and EOM are normal    Neck: Normal range of motion  Neck supple  Cardiovascular: Normal rate, regular rhythm and normal heart sounds  Pulmonary/Chest: Effort normal and breath sounds normal    Abdominal: Soft  Bowel sounds are normal    Musculoskeletal: Normal range of motion  Neurological: She is alert and oriented to person, place, and time  Skin: Skin is warm  Psychiatric: She has a normal mood and affect  Her behavior is normal  Judgment and thought content normal    Nursing note and vitals reviewed  BMI Counseling: Body mass index is 25 29 kg/m²   The BMI is below normal  Patient was advised to gain weight

## 2020-03-12 ENCOUNTER — TELEPHONE (OUTPATIENT)
Dept: FAMILY MEDICINE CLINIC | Facility: CLINIC | Age: 65
End: 2020-03-12

## 2020-03-12 DIAGNOSIS — J44.9 CHRONIC OBSTRUCTIVE PULMONARY DISEASE, UNSPECIFIED COPD TYPE (HCC): Primary | ICD-10-CM

## 2020-03-13 ENCOUNTER — TELEPHONE (OUTPATIENT)
Dept: FAMILY MEDICINE CLINIC | Facility: CLINIC | Age: 65
End: 2020-03-13

## 2020-03-13 RX ORDER — PREDNISONE 10 MG/1
10 TABLET ORAL
Qty: 30 TABLET | Refills: 0 | Status: SHIPPED | OUTPATIENT
Start: 2020-03-13 | End: 2020-05-18

## 2020-03-15 DIAGNOSIS — E03.9 HYPOTHYROIDISM, UNSPECIFIED TYPE: ICD-10-CM

## 2020-03-16 RX ORDER — LEVOTHYROXINE SODIUM 0.03 MG/1
25 TABLET ORAL
Qty: 90 TABLET | Refills: 1 | Status: SHIPPED | OUTPATIENT
Start: 2020-03-16 | End: 2020-05-18 | Stop reason: SDUPTHER

## 2020-03-25 ENCOUNTER — TELEPHONE (OUTPATIENT)
Dept: PULMONOLOGY | Facility: CLINIC | Age: 65
End: 2020-03-25

## 2020-03-25 NOTE — TELEPHONE ENCOUNTER
Due to the current pandemic of COVID-19 Patient was given the option to par take in a video/ telephone call which was accepted by the patient  Patients preferred phone number to [phone] ([facility])        Patient informed that they will receive an e-mail 15 minutes before their scheduled time as a reminder             Any further questions patients may have they are informed to call [phone]

## 2020-03-27 ENCOUNTER — TELEMEDICINE (OUTPATIENT)
Dept: PULMONOLOGY | Facility: CLINIC | Age: 65
End: 2020-03-27
Payer: MEDICARE

## 2020-03-27 DIAGNOSIS — J44.9 CHRONIC OBSTRUCTIVE PULMONARY DISEASE, UNSPECIFIED COPD TYPE (HCC): Primary | ICD-10-CM

## 2020-03-27 DIAGNOSIS — J30.9 ALLERGIC RHINITIS, UNSPECIFIED SEASONALITY, UNSPECIFIED TRIGGER: ICD-10-CM

## 2020-03-27 PROCEDURE — 99213 OFFICE O/P EST LOW 20 MIN: CPT | Performed by: INTERNAL MEDICINE

## 2020-03-27 NOTE — PROGRESS NOTES
Virtual Regular Visit    Problem List Items Addressed This Visit        Respiratory    Allergic rhinitis    Chronic obstructive pulmonary disease ([facility] [address] ) - Primary               Reason for visit is followup    Encounter provider Yaneli Harris MD    Provider located at [address] [place] [address] [address] [facility] [id number]      Recent Visits  Date Type Provider Dept   03/25/20 Telephone Ted Thomas [facility] [address] recent visits within past 7 days and meeting all other requirements     Today's Visits  Date Type Provider Dept   03/27/20 Telemedicine Yaneli Harris MD [facility] [place]   Showing today's visits and meeting all other requirements     Future Appointments  No visits were found meeting these conditions  Showing future appointments within next 150 days and meeting all other requirements        After connecting through [employer], the patient was identified by name and date of birth  Mart Young was informed that this is a telemedicine visit and that the visit is being conducted through [employer] and patient was informed that this is not a secure, HIPAA-complaint platform  she agrees to proceed  which may not be secure and therefore, might not be HIPAA-compliant  My office door was closed  No one else was in the room  She acknowledged consent and understanding of privacy and security of the video platform  The patient has agreed to participate and understands they can discontinue the visit at any time  Subjective  Mart Young is a 75 y o  female with COPD and allergic rhinitis  The patient stated that in February she had an episode of acute sinusitis  She was treated with antibiotics  About 2 weeks ago she was treated with prednisone  She also had increasing cough  Now the cough has markedly improved  She is close to her baseline  She has mild yellowish sputum production  Denies fever or chills    No nocturnal symptoms  She is using Symbicort 160/4 5, 2 inhalations twice a day and Spiriva once a day  She does not need to use her rescue inhaler  She also uses montelukast 10 mg once a day and fluticasone nasal spray 2 sprays to each nostril once a day  Past Medical History:   Diagnosis Date    Cataract     History of total hysterectomy        Past Surgical History:   Procedure Laterality Date    CATARACT EXTRACTION, BILATERAL      COLONOSCOPY      HYSTERECTOMY      REVISION COLOSTOMY  1976    SHOULDER SURGERY  2006    S/P dislocated shoulder       Current Outpatient Medications   Medication Sig Dispense Refill    fluticasone (FLONASE) 50 mcg/act nasal spray 2 sprays into each nostril daily      ipratropium (ATROVENT) 0 03 % nasal spray USE 2 SPRAYS BOTH NOSTRILS 3 TIMES A DAY AS NEEDED 30 mL 4    levothyroxine 25 mcg tablet TAKE 1 TABLET (25 MCG TOTAL) BY MOUTH DAILY IN THE EARLY MORNING 90 tablet 1    loratadine-pseudoephedrine (CLARITIN-D 24 HOUR)  mg per 24 hr tablet Take 1 tablet by mouth daily      meloxicam (MOBIC) 15 mg tablet Take 1 tablet by mouth      metroNIDAZOLE (METROGEL) 0 75 % gel Apply to face twice daily for rosacea 45 g 1    montelukast (SINGULAIR) 10 mg tablet TAKE 1 TABLET DAILY 90 tablet 1    predniSONE 10 mg tablet Take 1 tablet (10 mg total) by mouth 3 (three) times a day with meals 30 tablet 0    PREMARIN 0 45 MG tablet TAKE 1 TABLET (0 45 MG TOTAL) BY MOUTH DAILY  FOR 21 DAYS, THEN 7 DAYS OFF  6    RESTASIS 0 05 % ophthalmic emulsion       SYMBICORT 160-4 5 MCG/ACT inhaler Inhale 2 puffs 2 (two) times a day Rinse mouth after use  3 Inhaler 3    tiotropium (SPIRIVA HANDIHALER) 18 mcg inhalation capsule Place 1 capsule (18 mcg total) into inhaler and inhale daily 90 capsule 3     No current facility-administered medications for this visit           Allergies   Allergen Reactions    Alcohol     Other      narcotics       Family history and social history were reviewed  Review of Systems:  Review of systems is done and aside from what is stated above the rest of the review of systems is negative  Physical Exam:  Physical examination is not done  From the video cough, the patient looked comfortable  Assessment:  · Chronic obstructive pulmonary disease  · Allergic rhinitis  · Recent acute sinusitis  Plan and discussion:  The patient's COPD is in remission  I have maintained her on the Symbicort 160/4 5, 2 inhalations twice a day and Spiriva once a day  She will use the albuterol rescue inhaler 4 times a day as needed  I have also maintained her on fluticasone nasal spray 2 sprays to each nostril once a day and montelukast 10 mg once a day  I have recommended to the patient to consider using the saline nasal/sinus rinse bottle  She will come to the office to  sample  I will see her in 3 months in a follow-up visit  I have personally spent 7 minutes reviewing the chart and imaging prior to the visit  I have personally spent 12 minutes on the phone with the patient  I have personally spent 3 minutes reviewing imaging, laboratory results and other testing results with the patient

## 2020-05-14 ENCOUNTER — TELEPHONE (OUTPATIENT)
Dept: FAMILY MEDICINE CLINIC | Facility: CLINIC | Age: 65
End: 2020-05-14

## 2020-05-15 ENCOUNTER — APPOINTMENT (OUTPATIENT)
Dept: LAB | Facility: MEDICAL CENTER | Age: 65
End: 2020-05-15
Payer: MEDICARE

## 2020-05-15 DIAGNOSIS — H69.80 DYSFUNCTION OF EUSTACHIAN TUBE, UNSPECIFIED LATERALITY: ICD-10-CM

## 2020-05-15 DIAGNOSIS — J01.40 ACUTE PANSINUSITIS, RECURRENCE NOT SPECIFIED: ICD-10-CM

## 2020-05-15 DIAGNOSIS — E03.9 HYPOTHYROIDISM, UNSPECIFIED TYPE: ICD-10-CM

## 2020-05-15 DIAGNOSIS — E78.5 HYPERLIPIDEMIA, UNSPECIFIED HYPERLIPIDEMIA TYPE: ICD-10-CM

## 2020-05-15 DIAGNOSIS — J30.9 ALLERGIC RHINITIS, UNSPECIFIED SEASONALITY, UNSPECIFIED TRIGGER: ICD-10-CM

## 2020-05-15 DIAGNOSIS — J44.9 CHRONIC OBSTRUCTIVE PULMONARY DISEASE, UNSPECIFIED COPD TYPE (HCC): ICD-10-CM

## 2020-05-15 LAB
ALBUMIN SERPL BCP-MCNC: 3.8 G/DL (ref 3.5–5)
ALP SERPL-CCNC: 57 U/L (ref 46–116)
ALT SERPL W P-5'-P-CCNC: 22 U/L (ref 12–78)
ANION GAP SERPL CALCULATED.3IONS-SCNC: 6 MMOL/L (ref 4–13)
AST SERPL W P-5'-P-CCNC: 16 U/L (ref 5–45)
BACTERIA UR QL AUTO: ABNORMAL /HPF
BILIRUB SERPL-MCNC: 0.47 MG/DL (ref 0.2–1)
BILIRUB UR QL STRIP: NEGATIVE
BUN SERPL-MCNC: 17 MG/DL (ref 5–25)
CALCIUM SERPL-MCNC: 9.3 MG/DL (ref 8.3–10.1)
CHLORIDE SERPL-SCNC: 109 MMOL/L (ref 100–108)
CHOLEST SERPL-MCNC: 284 MG/DL (ref 50–200)
CLARITY UR: ABNORMAL
CO2 SERPL-SCNC: 25 MMOL/L (ref 21–32)
COLOR UR: ABNORMAL
CREAT SERPL-MCNC: 0.84 MG/DL (ref 0.6–1.3)
ERYTHROCYTE [DISTWIDTH] IN BLOOD BY AUTOMATED COUNT: 13.2 % (ref 11.6–15.1)
GFR SERPL CREATININE-BSD FRML MDRD: 73 ML/MIN/1.73SQ M
GLUCOSE P FAST SERPL-MCNC: 88 MG/DL (ref 65–99)
GLUCOSE UR STRIP-MCNC: NEGATIVE MG/DL
HCT VFR BLD AUTO: 42.4 % (ref 34.8–46.1)
HDLC SERPL-MCNC: 76 MG/DL
HGB BLD-MCNC: 13.7 G/DL (ref 11.5–15.4)
HGB UR QL STRIP.AUTO: NEGATIVE
KETONES UR STRIP-MCNC: NEGATIVE MG/DL
LDLC SERPL CALC-MCNC: 179 MG/DL (ref 0–100)
LEUKOCYTE ESTERASE UR QL STRIP: ABNORMAL
MCH RBC QN AUTO: 30.4 PG (ref 26.8–34.3)
MCHC RBC AUTO-ENTMCNC: 32.3 G/DL (ref 31.4–37.4)
MCV RBC AUTO: 94 FL (ref 82–98)
NITRITE UR QL STRIP: NEGATIVE
NON-SQ EPI CELLS URNS QL MICRO: ABNORMAL /HPF
PH UR STRIP.AUTO: 5.5 [PH]
PLATELET # BLD AUTO: 270 THOUSANDS/UL (ref 149–390)
PMV BLD AUTO: 11.4 FL (ref 8.9–12.7)
POTASSIUM SERPL-SCNC: 4 MMOL/L (ref 3.5–5.3)
PROT SERPL-MCNC: 7.3 G/DL (ref 6.4–8.2)
PROT UR STRIP-MCNC: NEGATIVE MG/DL
RBC # BLD AUTO: 4.51 MILLION/UL (ref 3.81–5.12)
RBC #/AREA URNS AUTO: ABNORMAL /HPF
SODIUM SERPL-SCNC: 140 MMOL/L (ref 136–145)
SP GR UR STRIP.AUTO: 1.02 (ref 1–1.03)
T4 FREE SERPL-MCNC: 1.04 NG/DL (ref 0.76–1.46)
T4 SERPL-MCNC: 8.4 UG/DL (ref 4.7–13.3)
TRIGL SERPL-MCNC: 143 MG/DL
TSH SERPL DL<=0.05 MIU/L-ACNC: 5.39 UIU/ML (ref 0.36–3.74)
UROBILINOGEN UR QL STRIP.AUTO: 0.2 E.U./DL
WBC # BLD AUTO: 5.78 THOUSAND/UL (ref 4.31–10.16)
WBC #/AREA URNS AUTO: ABNORMAL /HPF

## 2020-05-15 PROCEDURE — 81001 URINALYSIS AUTO W/SCOPE: CPT

## 2020-05-15 PROCEDURE — 84439 ASSAY OF FREE THYROXINE: CPT

## 2020-05-15 PROCEDURE — 80053 COMPREHEN METABOLIC PANEL: CPT

## 2020-05-15 PROCEDURE — 80061 LIPID PANEL: CPT

## 2020-05-15 PROCEDURE — 84443 ASSAY THYROID STIM HORMONE: CPT

## 2020-05-15 PROCEDURE — 85027 COMPLETE CBC AUTOMATED: CPT

## 2020-05-15 PROCEDURE — 36415 COLL VENOUS BLD VENIPUNCTURE: CPT

## 2020-05-18 ENCOUNTER — TELEMEDICINE (OUTPATIENT)
Dept: FAMILY MEDICINE CLINIC | Facility: CLINIC | Age: 65
End: 2020-05-18
Payer: MEDICARE

## 2020-05-18 DIAGNOSIS — E78.5 HYPERLIPIDEMIA, UNSPECIFIED HYPERLIPIDEMIA TYPE: ICD-10-CM

## 2020-05-18 DIAGNOSIS — H69.80 DYSFUNCTION OF EUSTACHIAN TUBE, UNSPECIFIED LATERALITY: ICD-10-CM

## 2020-05-18 DIAGNOSIS — J44.9 CHRONIC OBSTRUCTIVE PULMONARY DISEASE, UNSPECIFIED COPD TYPE (HCC): ICD-10-CM

## 2020-05-18 DIAGNOSIS — J30.9 ALLERGIC RHINITIS, UNSPECIFIED SEASONALITY, UNSPECIFIED TRIGGER: ICD-10-CM

## 2020-05-18 DIAGNOSIS — Z12.31 SCREENING MAMMOGRAM FOR HIGH-RISK PATIENT: ICD-10-CM

## 2020-05-18 DIAGNOSIS — E03.9 HYPOTHYROIDISM, UNSPECIFIED TYPE: Primary | ICD-10-CM

## 2020-05-18 DIAGNOSIS — Z80.3 FAMILY HISTORY OF BREAST CANCER IN MOTHER: ICD-10-CM

## 2020-05-18 PROBLEM — J01.90 ACUTE NON-RECURRENT SINUSITIS: Status: RESOLVED | Noted: 2020-03-09 | Resolved: 2020-05-18

## 2020-05-18 PROCEDURE — 99214 OFFICE O/P EST MOD 30 MIN: CPT | Performed by: FAMILY MEDICINE

## 2020-05-18 RX ORDER — CHLORAL HYDRATE 500 MG
3000 CAPSULE ORAL DAILY
COMMUNITY

## 2020-05-18 RX ORDER — LEVOTHYROXINE SODIUM 0.05 MG/1
50 TABLET ORAL
Qty: 90 TABLET | Refills: 3 | Status: SHIPPED | OUTPATIENT
Start: 2020-05-18 | End: 2021-05-11

## 2020-06-01 DIAGNOSIS — J30.9 ALLERGIC RHINITIS: ICD-10-CM

## 2020-06-01 RX ORDER — MONTELUKAST SODIUM 10 MG/1
TABLET ORAL
Qty: 90 TABLET | Refills: 1 | Status: SHIPPED | OUTPATIENT
Start: 2020-06-01 | End: 2020-11-27

## 2020-06-19 ENCOUNTER — APPOINTMENT (OUTPATIENT)
Dept: LAB | Facility: CLINIC | Age: 65
End: 2020-06-19
Payer: MEDICARE

## 2020-06-19 DIAGNOSIS — E78.5 HYPERLIPIDEMIA, UNSPECIFIED HYPERLIPIDEMIA TYPE: ICD-10-CM

## 2020-06-19 DIAGNOSIS — E03.9 HYPOTHYROIDISM, UNSPECIFIED TYPE: ICD-10-CM

## 2020-06-19 LAB
LDLC SERPL DIRECT ASSAY-MCNC: 171 MG/DL (ref 0–100)
T4 FREE SERPL-MCNC: 1.18 NG/DL (ref 0.76–1.46)
T4 SERPL-MCNC: 10.2 UG/DL (ref 4.7–13.3)
TSH SERPL DL<=0.05 MIU/L-ACNC: 4.02 UIU/ML (ref 0.36–3.74)

## 2020-06-19 PROCEDURE — 36415 COLL VENOUS BLD VENIPUNCTURE: CPT

## 2020-06-19 PROCEDURE — 83721 ASSAY OF BLOOD LIPOPROTEIN: CPT

## 2020-06-19 PROCEDURE — 84443 ASSAY THYROID STIM HORMONE: CPT

## 2020-06-19 PROCEDURE — 84439 ASSAY OF FREE THYROXINE: CPT

## 2020-08-22 ENCOUNTER — OFFICE VISIT (OUTPATIENT)
Dept: FAMILY MEDICINE CLINIC | Facility: CLINIC | Age: 65
End: 2020-08-22
Payer: MEDICARE

## 2020-08-22 VITALS
BODY MASS INDEX: 25.03 KG/M2 | RESPIRATION RATE: 16 BRPM | OXYGEN SATURATION: 97 % | SYSTOLIC BLOOD PRESSURE: 120 MMHG | HEART RATE: 78 BPM | HEIGHT: 70 IN | DIASTOLIC BLOOD PRESSURE: 78 MMHG | WEIGHT: 174.8 LBS | TEMPERATURE: 97.5 F

## 2020-08-22 DIAGNOSIS — M62.830 MUSCLE SPASM OF BACK: ICD-10-CM

## 2020-08-22 DIAGNOSIS — M25.551 RIGHT HIP PAIN: ICD-10-CM

## 2020-08-22 DIAGNOSIS — G57.01 PIRIFORMIS SYNDROME OF RIGHT SIDE: ICD-10-CM

## 2020-08-22 DIAGNOSIS — M15.9 OSTEOARTHRITIS OF MULTIPLE JOINTS, UNSPECIFIED OSTEOARTHRITIS TYPE: ICD-10-CM

## 2020-08-22 DIAGNOSIS — M79.604 RIGHT LEG PAIN: Primary | ICD-10-CM

## 2020-08-22 PROCEDURE — 1160F RVW MEDS BY RX/DR IN RCRD: CPT | Performed by: FAMILY MEDICINE

## 2020-08-22 PROCEDURE — 1036F TOBACCO NON-USER: CPT | Performed by: FAMILY MEDICINE

## 2020-08-22 PROCEDURE — 4040F PNEUMOC VAC/ADMIN/RCVD: CPT | Performed by: FAMILY MEDICINE

## 2020-08-22 PROCEDURE — 3008F BODY MASS INDEX DOCD: CPT | Performed by: FAMILY MEDICINE

## 2020-08-22 PROCEDURE — 99214 OFFICE O/P EST MOD 30 MIN: CPT | Performed by: FAMILY MEDICINE

## 2020-08-22 RX ORDER — TIZANIDINE 2 MG/1
TABLET ORAL
Qty: 30 TABLET | Refills: 1 | Status: SHIPPED | OUTPATIENT
Start: 2020-08-22 | End: 2021-10-06

## 2020-08-22 RX ORDER — METHYLPREDNISOLONE 4 MG/1
TABLET ORAL
Qty: 1 EACH | Refills: 0 | Status: SHIPPED | OUTPATIENT
Start: 2020-08-22 | End: 2020-08-28

## 2020-08-22 NOTE — PATIENT INSTRUCTIONS
Take Medrol as prescribed on package  Use tizanidine 3 times daily as needed for muscle spasm or pain  Do not drive or operate machinery until side effect is known    This will cause drowsiness  Use ice 15 minutes 4 times daily to back  Continue meloxicam 15 mg daily  Recheck next week as planned  If worsen report to [facility] Emergency Department

## 2020-08-22 NOTE — PROGRESS NOTES
Assessment and Plan:  1  Right leg pain  2  Right hip pain  3  Piriformis syndrome, right side  Medrol Dosepak was prescribed  4  Muscle spasm back, tizanidine was prescribed drowsiness discussed  5  DJD, continue meloxicam  6  Patient does have an appointment next week for recheck she should keep this  If worsen report to [facility] Emergency Department      Problem List Items Addressed This Visit        Musculoskeletal and Integument    Osteoarthritis     Continue meloxicam daily           Other Visit Diagnoses     Right leg pain    -  Primary    Relevant Medications    methylPREDNISolone 4 MG tablet therapy pack    Piriformis syndrome of right side        Relevant Medications    methylPREDNISolone 4 MG tablet therapy pack    Right hip pain        Relevant Medications    methylPREDNISolone 4 MG tablet therapy pack    Muscle spasm of back        Relevant Medications    tiZANidine (ZANAFLEX) 2 mg tablet                 Diagnoses and all orders for this visit:    Right leg pain  -     methylPREDNISolone 4 MG tablet therapy pack; Use as directed on package    Piriformis syndrome of right side  -     methylPREDNISolone 4 MG tablet therapy pack; Use as directed on package    Right hip pain  -     methylPREDNISolone 4 MG tablet therapy pack; Use as directed on package    Osteoarthritis of multiple joints, unspecified osteoarthritis type    Muscle spasm of back  -     tiZANidine (ZANAFLEX) 2 mg tablet; Take 1 tablet 3 times daily for muscle spasm or back/leg pain              Subjective:      Patient ID: Jason Sow is a 77 y o  female  CC:    Chief Complaint   Patient presents with    Leg Pain     right leg, it has been about a little over a week  The pain is more intense then usual, past couple of days it has been worse  Pt noticed a little bump by her right hip   Pt tried ibuprofen and tylenol, heat and ice       HPI:    For the past week or so patient has had some mild to moderate pain right low back down to right posterior buttock to right leg office on using meloxicam with somewhat relief noted  However yesterday patient went to the diner and walking up the steps she felt severe of lancinating pain worsens she is follow-up for the past week  It is slowly improving but not fully resolved  The pain was so bad she thought she would fall at that time  It has not reoccurred  Negative main leg weakness or paresthesias  No history of trauma to the area  The following portions of the patient's history were reviewed and updated as appropriate: allergies, current medications, past family history, past medical history, past social history, past surgical history and problem list       Review of Systems   Constitutional: Negative  HENT: Negative  Eyes: Negative  Respiratory: Negative  Cardiovascular: Negative  Gastrointestinal: Negative  Endocrine: Negative  Genitourinary: Negative  Musculoskeletal:        HPI   Skin: Negative  Allergic/Immunologic: Negative  Neurological: Negative  HPI   Hematological: Negative  Psychiatric/Behavioral: Negative  Data to review:       Objective:    Vitals:    08/22/20 1047   BP: 120/78   BP Location: Left arm   Patient Position: Sitting   Cuff Size: Adult   Pulse: 78   Resp: 16   Temp: 97 5 °F (36 4 °C)   TempSrc: Tympanic   SpO2: 97%   Weight: 79 3 kg (174 lb 12 8 oz)   Height: 5' 10 4" (1 788 m)        Physical Exam  Vitals signs and nursing note reviewed  Constitutional:       Appearance: Normal appearance  HENT:      Head: Normocephalic and atraumatic  Eyes:      General: No scleral icterus  Neck:      Musculoskeletal: Neck supple  Vascular: No carotid bruit  Cardiovascular:      Rate and Rhythm: Normal rate and regular rhythm  Pulses: Normal pulses  Heart sounds: Normal heart sounds  Pulmonary:      Effort: Pulmonary effort is normal       Breath sounds: Normal breath sounds     Abdominal: General: Bowel sounds are normal       Palpations: Abdomen is soft  Tenderness: There is no abdominal tenderness  Musculoskeletal:         General: Tenderness present  No swelling, deformity or signs of injury  Right lower leg: No edema  Left lower leg: No edema  Comments: Mild paravertebral muscle contraction positive mild spasm and tenderness right piriformis area  Straight leg raising 90° bilateral   Skin:     General: Skin is warm and dry  Neurological:      General: No focal deficit present  Mental Status: She is alert and oriented to person, place, and time  Sensory: No sensory deficit  Motor: No weakness        Coordination: Coordination normal       Gait: Gait normal       Deep Tendon Reflexes: Reflexes normal    Psychiatric:         Mood and Affect: Mood normal

## 2020-08-25 ENCOUNTER — OFFICE VISIT (OUTPATIENT)
Dept: FAMILY MEDICINE CLINIC | Facility: CLINIC | Age: 65
End: 2020-08-25
Payer: MEDICARE

## 2020-08-25 VITALS
DIASTOLIC BLOOD PRESSURE: 88 MMHG | TEMPERATURE: 98 F | WEIGHT: 175 LBS | HEART RATE: 64 BPM | RESPIRATION RATE: 16 BRPM | SYSTOLIC BLOOD PRESSURE: 122 MMHG | HEIGHT: 70 IN | BODY MASS INDEX: 25.05 KG/M2

## 2020-08-25 DIAGNOSIS — M25.551 RIGHT HIP PAIN: ICD-10-CM

## 2020-08-25 DIAGNOSIS — E03.9 HYPOTHYROIDISM, UNSPECIFIED TYPE: ICD-10-CM

## 2020-08-25 DIAGNOSIS — G57.01 PIRIFORMIS SYNDROME OF RIGHT SIDE: Primary | ICD-10-CM

## 2020-08-25 PROCEDURE — 1036F TOBACCO NON-USER: CPT | Performed by: FAMILY MEDICINE

## 2020-08-25 PROCEDURE — 4040F PNEUMOC VAC/ADMIN/RCVD: CPT | Performed by: FAMILY MEDICINE

## 2020-08-25 PROCEDURE — 3008F BODY MASS INDEX DOCD: CPT | Performed by: FAMILY MEDICINE

## 2020-08-25 PROCEDURE — 99213 OFFICE O/P EST LOW 20 MIN: CPT | Performed by: FAMILY MEDICINE

## 2020-08-25 PROCEDURE — 1160F RVW MEDS BY RX/DR IN RCRD: CPT | Performed by: FAMILY MEDICINE

## 2020-08-25 NOTE — PATIENT INSTRUCTIONS
Piriformis Syndrome   WHAT YOU NEED TO KNOW:   Piriformis syndrome is sciatic nerve pain caused by an injured or overused piriformis muscle  This is a muscle inside your buttocks that helps you move your leg  The pain is caused when this muscle pinches your sciatic nerve  You may feel the pain in your hip or down your leg  DISCHARGE INSTRUCTIONS:   Medicines: You may need any of the following:  · Prescription medicines  may be used to relax your muscles and decrease pain and swelling  · NSAIDs  help decrease swelling and pain  This medicine is available with or without a doctor's order  NSAIDs can cause stomach bleeding or kidney problems in certain people  If you take blood thinner medicine, always ask your healthcare provider if NSAIDs are safe for you  Always read the medicine label and follow directions  · Acetaminophen  decreases pain  It is available without a doctor's order  Ask how much to take and how often to take it  Follow directions  Acetaminophen can cause liver damage if not taken correctly  · Take your medicine as directed  Contact your healthcare provider if you think your medicine is not helping or if you have side effects  Tell him or her if you are allergic to any medicine  Keep a list of the medicines, vitamins, and herbs you take  Include the amounts, and when and why you take them  Bring the list or the pill bottles to follow-up visits  Carry your medicine list with you in case of an emergency  Follow up with your healthcare provider as directed: You may need to return for more tests  You may also be referred to a physical therapist  Write down your questions so you remember to ask them during your visits  Manage your symptoms of piriformis syndrome:   · Rest as directed  Avoid activities that make your pain worse  · Apply ice to the buttock on your injured side  Use an ice pack, or put crushed ice in a plastic bag  Cover it with a towel   Leave the ice on for 15 to 20 minutes every hour, or as directed  Ice helps prevent tissue damage and decreases swelling and pain  · Apply heat to the buttock on your injured side  Use heating pads for 20 to 30 minutes every 2 hours for as many days as directed  Heat helps decrease pain and muscle spasms  · Stretch as directed  Lie on your back with your knees bent  Place the ankle of your injured leg on the knee of your other leg  Gently pull your bent leg toward your chest, until you feel a stretch in the buttock of your injured leg  A physical therapist may show you other exercises to stretch and strengthen your hip muscles  Contact your healthcare provider if:   · Your pain worsens or returns, even with treatment  · You have questions or concerns about your condition or care  Return to the emergency department if:   · You cannot move your leg or foot  © 2017 [employer] [employer] Information is for End User's use only and may not be sold, redistributed or otherwise used for commercial purposes  All illustrations and images included in CareNotes® are the copyrighted property of A D A M [employer] Inc  or Kyrie Sheppard  The above information is an  only  It is not intended as medical advice for individual conditions or treatments  Talk to your doctor, nurse or pharmacist before following any medical regimen to see if it is safe and effective for you

## 2020-08-25 NOTE — PROGRESS NOTES
Assessment and Plan:    Problem List Items Addressed This Visit     None                 There are no diagnoses linked to this encounter  Subjective:      Patient ID: Jeff Mcconnell is a 77 y o  female  CC:    Chief Complaint   Patient presents with    Leg Pain     Patient present today for right leg pain  Patient was evaluated by Dr Luis Todd on Saturday who prescribed  Methylprednisolone and Zanaflex  Per patient they are helping but she is still having pain and discomfort  HPI:    Still with some r leg pain, some improvement with meds, no fall recently, watching how she is walking due to pain      The following portions of the patient's history were reviewed and updated as appropriate: allergies, current medications, past family history, past medical history, past social history, past surgical history and problem list       Review of Systems   Constitutional: Negative for activity change, appetite change, chills, fatigue and fever  Respiratory: Negative for shortness of breath  Cardiovascular: Negative for chest pain  Musculoskeletal: Positive for arthralgias and myalgias  Neurological: Negative for dizziness and headaches  Data to review:       Objective:    Vitals:    08/25/20 1433   BP: 122/88   BP Location: Left arm   Patient Position: Sitting   Cuff Size: Large   Pulse: 64   Resp: 16   Temp: 98 °F (36 7 °C)   TempSrc: Temporal   Weight: 79 4 kg (175 lb)   Height: 5' 10 4" (1 788 m)        Physical Exam  Vitals signs reviewed  Constitutional:       Appearance: Normal appearance  Musculoskeletal:         General: Tenderness present  Right lower leg: No edema  Left lower leg: No edema  Comments: Tenderness l hip over piriformis   Neurological:      Mental Status: She is alert

## 2020-08-26 ENCOUNTER — APPOINTMENT (OUTPATIENT)
Dept: RADIOLOGY | Facility: MEDICAL CENTER | Age: 65
End: 2020-08-26
Payer: MEDICARE

## 2020-08-26 DIAGNOSIS — M25.551 RIGHT HIP PAIN: ICD-10-CM

## 2020-08-26 DIAGNOSIS — G57.01 PIRIFORMIS SYNDROME OF RIGHT SIDE: ICD-10-CM

## 2020-08-26 PROCEDURE — 73502 X-RAY EXAM HIP UNI 2-3 VIEWS: CPT

## 2020-08-28 ENCOUNTER — APPOINTMENT (OUTPATIENT)
Dept: LAB | Facility: CLINIC | Age: 65
End: 2020-08-28
Payer: MEDICARE

## 2020-08-28 DIAGNOSIS — Z80.3 FAMILY HISTORY OF BREAST CANCER IN MOTHER: ICD-10-CM

## 2020-08-28 DIAGNOSIS — G57.01 PIRIFORMIS SYNDROME OF RIGHT SIDE: ICD-10-CM

## 2020-08-28 DIAGNOSIS — M25.551 RIGHT HIP PAIN: ICD-10-CM

## 2020-08-28 DIAGNOSIS — H69.80 DYSFUNCTION OF EUSTACHIAN TUBE, UNSPECIFIED LATERALITY: ICD-10-CM

## 2020-08-28 DIAGNOSIS — E78.5 HYPERLIPIDEMIA, UNSPECIFIED HYPERLIPIDEMIA TYPE: ICD-10-CM

## 2020-08-28 DIAGNOSIS — J30.9 ALLERGIC RHINITIS, UNSPECIFIED SEASONALITY, UNSPECIFIED TRIGGER: ICD-10-CM

## 2020-08-28 DIAGNOSIS — J44.9 CHRONIC OBSTRUCTIVE PULMONARY DISEASE, UNSPECIFIED COPD TYPE (HCC): ICD-10-CM

## 2020-08-28 DIAGNOSIS — E03.9 HYPOTHYROIDISM, UNSPECIFIED TYPE: ICD-10-CM

## 2020-08-28 LAB
ALBUMIN SERPL BCP-MCNC: 3.5 G/DL (ref 3.5–5)
ALP SERPL-CCNC: 58 U/L (ref 46–116)
ALT SERPL W P-5'-P-CCNC: 28 U/L (ref 12–78)
ANION GAP SERPL CALCULATED.3IONS-SCNC: 5 MMOL/L (ref 4–13)
AST SERPL W P-5'-P-CCNC: 15 U/L (ref 5–45)
BILIRUB SERPL-MCNC: 0.59 MG/DL (ref 0.2–1)
BUN SERPL-MCNC: 20 MG/DL (ref 5–25)
CALCIUM SERPL-MCNC: 9.4 MG/DL (ref 8.3–10.1)
CHLORIDE SERPL-SCNC: 109 MMOL/L (ref 100–108)
CHOLEST SERPL-MCNC: 258 MG/DL (ref 50–200)
CO2 SERPL-SCNC: 28 MMOL/L (ref 21–32)
CREAT SERPL-MCNC: 0.87 MG/DL (ref 0.6–1.3)
CRP SERPL QL: 10 MG/L
ERYTHROCYTE [DISTWIDTH] IN BLOOD BY AUTOMATED COUNT: 13.4 % (ref 11.6–15.1)
ERYTHROCYTE [SEDIMENTATION RATE] IN BLOOD: 14 MM/HOUR (ref 0–29)
GFR SERPL CREATININE-BSD FRML MDRD: 69 ML/MIN/1.73SQ M
GLUCOSE P FAST SERPL-MCNC: 82 MG/DL (ref 65–99)
HCT VFR BLD AUTO: 42.7 % (ref 34.8–46.1)
HDLC SERPL-MCNC: 82 MG/DL
HGB BLD-MCNC: 13.7 G/DL (ref 11.5–15.4)
LDLC SERPL CALC-MCNC: 144 MG/DL (ref 0–100)
MCH RBC QN AUTO: 30.3 PG (ref 26.8–34.3)
MCHC RBC AUTO-ENTMCNC: 32.1 G/DL (ref 31.4–37.4)
MCV RBC AUTO: 95 FL (ref 82–98)
PLATELET # BLD AUTO: 278 THOUSANDS/UL (ref 149–390)
PMV BLD AUTO: 10.9 FL (ref 8.9–12.7)
POTASSIUM SERPL-SCNC: 4.2 MMOL/L (ref 3.5–5.3)
PROT SERPL-MCNC: 7.2 G/DL (ref 6.4–8.2)
RBC # BLD AUTO: 4.52 MILLION/UL (ref 3.81–5.12)
SODIUM SERPL-SCNC: 142 MMOL/L (ref 136–145)
T4 SERPL-MCNC: 7.4 UG/DL (ref 4.7–13.3)
TRIGL SERPL-MCNC: 162 MG/DL
TSH SERPL DL<=0.05 MIU/L-ACNC: 3.69 UIU/ML (ref 0.36–3.74)
WBC # BLD AUTO: 6.4 THOUSAND/UL (ref 4.31–10.16)

## 2020-08-28 PROCEDURE — 84443 ASSAY THYROID STIM HORMONE: CPT

## 2020-08-28 PROCEDURE — 36415 COLL VENOUS BLD VENIPUNCTURE: CPT

## 2020-08-28 PROCEDURE — 86140 C-REACTIVE PROTEIN: CPT

## 2020-08-28 PROCEDURE — 84436 ASSAY OF TOTAL THYROXINE: CPT

## 2020-08-28 PROCEDURE — 85652 RBC SED RATE AUTOMATED: CPT

## 2020-08-28 PROCEDURE — 80061 LIPID PANEL: CPT

## 2020-08-28 PROCEDURE — 85027 COMPLETE CBC AUTOMATED: CPT

## 2020-08-28 PROCEDURE — 80053 COMPREHEN METABOLIC PANEL: CPT

## 2020-08-31 ENCOUNTER — EVALUATION (OUTPATIENT)
Dept: PHYSICAL THERAPY | Facility: REHABILITATION | Age: 65
End: 2020-08-31
Payer: MEDICARE

## 2020-08-31 DIAGNOSIS — G57.01 PIRIFORMIS SYNDROME OF RIGHT SIDE: Primary | ICD-10-CM

## 2020-08-31 DIAGNOSIS — M25.551 RIGHT HIP PAIN: ICD-10-CM

## 2020-08-31 PROCEDURE — 97110 THERAPEUTIC EXERCISES: CPT | Performed by: PHYSICAL THERAPIST

## 2020-08-31 PROCEDURE — 97161 PT EVAL LOW COMPLEX 20 MIN: CPT | Performed by: PHYSICAL THERAPIST

## 2020-08-31 NOTE — PROGRESS NOTES
PT Evaluation     Today's date: 2020  Patient name: Kymberly Erickson  : 1955  MRN: [id number]  Referring provider: Tiffanie Mullins MD  Dx:   Encounter Diagnosis     ICD-10-CM    1  Piriformis syndrome of right side  G57 01 Ambulatory referral to Physical Therapy   2  Right hip pain  M25 551 Ambulatory referral to Physical Therapy       Start Time: 1110  Stop Time: 1200  Total time in clinic (min): 50 minutes    Assessment/Plan  Kymberly Erickson is a 77 y o  female who was referred to physical therapy for management of hip pain secondary to hip flexor strain/anterior glide medial rotation syndrome  Primary impairments include poor proximal hip/core strength, posterior capsule tightness, and report of constant pain  Consequently, patient has difficulty completing ADLs including ambulation, sitting, and stair navigation  Khloe Wilhelm would benefit from skilled intervention to address all deficits and improve functional capability  Patient is a good candidate for therapy, pending compliance with HEP and 2x weekly participation  Thank you for the referral and please do not hesitate to contact me with any questions or concerns regarding Orphas care! Plan  Frequency: 2x per week  Duration in visits: 12  Duration in weeks: 6   Therapeutic exercise/activity, neuromuscular reeducation, manual therapy, and modalities  Patient understands and agrees to plan of care  Goals  Short Term--4 weeks  1  Report of intermittent pain that does not exacerbate > 4/10  2  1/2 point increase in lower extremity strength  3  Normal hip mobility  Long Term--By Discharge  1  Patient will achieve expected FOTO score  2  Patient will ambulate 1 mile without aggravation of hip pain  3  Patient will sleep without disturbance secondary to hip pain  Patient's Goal: Walk her dog without pain  Subjective  History   Date of Onset: 2 5 weeks ago    Description: Patient was sitting on the side of her bathtub rinsing her legs off when she felt "increased pressure" in her L buttock  Over the next several days she began to experience intense R-sided hip pain that did not resolve  Approximately five days since the initial sx, she attempted to ascend a flight of steps at the [address] [place] [address] when her R LE "gave out "  She was able to catch herself by grabbing on with the railing and the symptoms were fleeting, allowing her to continue up the stairs without resting  She saw Dr Rhea Morrow re: this incident, and he prescribed her a muscle relaxer, an anti-inflammatory, and a 6-day prednisone pack  The prednisone provided relief but symptoms returned once she finished it  She then followed up with Dr Cristy Ferreira, who referred to physical therapy and ordered a radiograph and bloodwork  Patient reports that she has not yet received these results  Prior to injury patient reports that she walks 2-3 miles daily with her [place]  Symptoms  Constant pain in anterior thigh and posterior hip/upper glute that she describes as stiffness/achniess  Aggravated with > 1/2 mile ambulation, lying in bed (all positions), and sitting  Patient reports one instance of "numbness" in lateral thigh that lasted for approximately 30 minutes but that has not happened again  Lower extremity has not given out since initial incident but she has not attempted a flight of stairs recently  Pain at best: 4/10  Pain at worst: 7/10    Social History  Natalie Rm lives independently in a single story home and no steps to enter  Objective    GAIT: Antalgic R LE  Squat assess: Unable to squat > parallel with inc discomfort noted in R hip  SLS: RLE: 10 sec, contralateral hip drop, report of pain, LLE: 3 sec, contralateral hip drop           MMT         AROM    Hip         R          L         R        L   Flex  4 P! 4+ WNL WNL   Extn  4- 4-     Abd  3- 3- WNL WNL   Add  NT NT     IR  4 4 WNL WNL   ER  4- 4 WNL WNL                   MMT    Knee      R          L   Flex   5 5 Extn  4+ P! 5     Palpation: (+) TTP R quad, ischial tuberosity    Sensation (pinprick):  Normal    Slump test: L=  neg   R=  neg          Hip:  scour=  neg   lauro =  Pos R  Fadir=  neg joint mobility= tight posterior capsule  Active SLR = pos ADD/IR R worse than L     Transverse abdominis: Bent knee fall out= poor           Precautions: None  Manuals 8/31            PA Greater troch mobs (post capsule) 4'            LAD? Infer glide?              Mango stretch/quad STM             Neuro Re-Ed             Quadruped rockbacks             TVA             TVA+hip ABD             TVA+bridge                                                    Ther Ex             clamshells 10x3" HEP            Standing HF stretch 10x10" HEP                         bike                                                                 Ther Activity                                       Gait Training                                       Modalities

## 2020-09-01 DIAGNOSIS — R79.82 CRP ELEVATED: Primary | ICD-10-CM

## 2020-09-04 ENCOUNTER — APPOINTMENT (OUTPATIENT)
Dept: LAB | Facility: CLINIC | Age: 65
End: 2020-09-04
Payer: MEDICARE

## 2020-09-04 DIAGNOSIS — R79.82 CRP ELEVATED: ICD-10-CM

## 2020-09-04 PROCEDURE — 86430 RHEUMATOID FACTOR TEST QUAL: CPT

## 2020-09-04 PROCEDURE — 86038 ANTINUCLEAR ANTIBODIES: CPT

## 2020-09-04 PROCEDURE — 36415 COLL VENOUS BLD VENIPUNCTURE: CPT

## 2020-09-08 ENCOUNTER — OFFICE VISIT (OUTPATIENT)
Dept: PHYSICAL THERAPY | Facility: REHABILITATION | Age: 65
End: 2020-09-08
Payer: MEDICARE

## 2020-09-08 DIAGNOSIS — M25.551 RIGHT HIP PAIN: ICD-10-CM

## 2020-09-08 DIAGNOSIS — G57.01 PIRIFORMIS SYNDROME OF RIGHT SIDE: Primary | ICD-10-CM

## 2020-09-08 LAB — RHEUMATOID FACT SER QL LA: NEGATIVE

## 2020-09-08 PROCEDURE — 97140 MANUAL THERAPY 1/> REGIONS: CPT | Performed by: PHYSICAL THERAPIST

## 2020-09-08 NOTE — PROGRESS NOTES
Daily Note     Today's date: 2020  Patient name: Sabiha Davis  : 1955  MRN: [id number]  Referring provider: Monique Ewing MD  Dx:   Encounter Diagnosis     ICD-10-CM    1  Piriformis syndrome of right side  G57 01    2  Right hip pain  M25 551                   Subjective: Pt cont to have on and off pain present into hip  Pt notes that she stopped taking meloxicam and is taking ibuprofen and tylenol instead due to stomach irritation-pt notes she is planning to speak with PCP  Pt c/o most discomfort along right glute into anterior groin/thigh and notes numbness occurring into anterior thigh to right knee  Objective: See treatment diary below    Precautions: None        Manual  20         assessed Lspine 15 mins            grade III CPA/R UPA L1-4 15 mins             hip posterior femoral head mobs 8 mins                                              Neuro Re-Ed                                                                                                  Therex                                                                                                                                        Gait Training                                 Modalities             MHP prone 10 mins                                           Assessment: Pt performs repeated lumbar testing with increase in sx's into anterior thigh of numbness/tingling with repeated flexion  Pt notes being "cautious" extension however c/o more sx's into right glute  Pt has diminished lumbar joint play throughout and reproducible sx's with CPA/right UPA at L2-3 which improved mobility and sx's after performing mobs  Pt was then able to amb with less caution or sx's after manual mobs  Ended MHP to LB after tx in prone to assist with centralization  Pt encouraged to keep note on sx's throughout the next 2 days leading up to next f/u appt  Plan: Continue per plan of care

## 2020-09-09 LAB — RYE IGE QN: NEGATIVE

## 2020-09-10 ENCOUNTER — OFFICE VISIT (OUTPATIENT)
Dept: PHYSICAL THERAPY | Facility: REHABILITATION | Age: 65
End: 2020-09-10
Payer: MEDICARE

## 2020-09-10 DIAGNOSIS — M25.551 RIGHT HIP PAIN: ICD-10-CM

## 2020-09-10 DIAGNOSIS — G57.01 PIRIFORMIS SYNDROME OF RIGHT SIDE: Primary | ICD-10-CM

## 2020-09-10 PROCEDURE — 97110 THERAPEUTIC EXERCISES: CPT | Performed by: PHYSICAL THERAPIST

## 2020-09-10 PROCEDURE — 97140 MANUAL THERAPY 1/> REGIONS: CPT | Performed by: PHYSICAL THERAPIST

## 2020-09-10 NOTE — PROGRESS NOTES
Daily Note     Today's date: 9/10/2020  Patient name: Néstor Gomez  : 1955  MRN: [id number]  Referring provider: Estephanie Mcadams MD  Dx:   Encounter Diagnosis     ICD-10-CM    1  Piriformis syndrome of right side  G57 01    2  Right hip pain  M25 551                   Subjective: Pt notes she was feeling great after last visit with less right leg pain  Pt does note the following day she performed hip flexor stretch and began with right anterior leg pain  Pt was unsure if sx's return due to exercise or not  Objective: See treatment diary below    Precautions: None        Manual  9/8/20  9/10/20       assessed Lspine 15 mins            grade III CPA/R UPA L1-4 15 mins   15 mins           hip posterior femoral head mobs 8 mins   8 mins                                            Neuro Re-Ed             Supine TrA  5"x10        Prone hip ext           bridges           Hip add isomet TrA                                                     Therex            clamshells   3"x10         hip abd SL   3"x10                                                                                                             Gait Training                                 Modalities             MHP prone 10 mins  10 mins                                          Assessment: Pt tolerated manual tx with centralization of sx's during CPA's and sacral rocking  Pt denies increase in sx's into right LE with integration of transverse abdominal exercises  Pt left PT with centralized LBP but no right LE sx's  Plan: Continue per plan of care

## 2020-09-14 ENCOUNTER — OFFICE VISIT (OUTPATIENT)
Dept: PHYSICAL THERAPY | Facility: REHABILITATION | Age: 65
End: 2020-09-14
Payer: MEDICARE

## 2020-09-14 DIAGNOSIS — M25.551 RIGHT HIP PAIN: ICD-10-CM

## 2020-09-14 DIAGNOSIS — G57.01 PIRIFORMIS SYNDROME OF RIGHT SIDE: Primary | ICD-10-CM

## 2020-09-14 PROCEDURE — 97140 MANUAL THERAPY 1/> REGIONS: CPT | Performed by: PHYSICAL THERAPIST

## 2020-09-14 PROCEDURE — 97112 NEUROMUSCULAR REEDUCATION: CPT | Performed by: PHYSICAL THERAPIST

## 2020-09-14 PROCEDURE — 97110 THERAPEUTIC EXERCISES: CPT | Performed by: PHYSICAL THERAPIST

## 2020-09-14 NOTE — PROGRESS NOTES
Daily Note     Today's date: 2020  Patient name: Dayton Basurto  : 1955  MRN: [id number]  Referring provider: Argenis Turner MD  Dx:   Encounter Diagnosis     ICD-10-CM    1  Piriformis syndrome of right side  G57 01    2  Right hip pain  M25 551                   Subjective: Pt reports cont with achyness present along right LE, noting some cramping present into right groin and med thigh  Objective: See treatment diary below    Precautions: None        Manual  9/8/20  9/10/20 9/14/20       assessed Lspine 15 mins            grade III CPA/R UPA L1-4 15 mins   15 mins   15 mins         hip posterior femur mobs 8 mins   8 mins   5 mins         lat hip belt distract     3 mins                            Neuro Re-Ed             Supine TrA  5"x10   hep     Prone hip ext     nv      bridges      3"x10      Hip add isomet TrA      5"x10                                               Therex            clamshells   3"x10   ytb 3"x15       hip abd SL   3"x10   3"x15        upright bike     10 mins                                                                                             Gait Training                                 Modalities             MHP prone 10 mins  10 mins  10 mins                                         Assessment: Pt does well with manual tx focusing on hip and lumbar mobilizations  Added belt distraction which assisted with groin "cramping" sx's  Pt demo's poor TA control with HEP and reviewed exercises to assist with better muscle control and issued updated HEP with more clear instructions  Pt has f/u with PCP on Wednesday to discuss x-rays  Plan: Continue per plan of care

## 2020-09-16 ENCOUNTER — OFFICE VISIT (OUTPATIENT)
Dept: FAMILY MEDICINE CLINIC | Facility: CLINIC | Age: 65
End: 2020-09-16
Payer: MEDICARE

## 2020-09-16 VITALS
HEIGHT: 70 IN | TEMPERATURE: 97.1 F | HEART RATE: 70 BPM | WEIGHT: 172.4 LBS | BODY MASS INDEX: 24.68 KG/M2 | DIASTOLIC BLOOD PRESSURE: 70 MMHG | SYSTOLIC BLOOD PRESSURE: 122 MMHG

## 2020-09-16 DIAGNOSIS — M15.9 OSTEOARTHRITIS OF MULTIPLE JOINTS, UNSPECIFIED OSTEOARTHRITIS TYPE: ICD-10-CM

## 2020-09-16 DIAGNOSIS — J44.9 CHRONIC OBSTRUCTIVE PULMONARY DISEASE, UNSPECIFIED COPD TYPE (HCC): Primary | ICD-10-CM

## 2020-09-16 DIAGNOSIS — Z00.00 HEALTHCARE MAINTENANCE: ICD-10-CM

## 2020-09-16 DIAGNOSIS — Z23 ENCOUNTER FOR IMMUNIZATION: ICD-10-CM

## 2020-09-16 DIAGNOSIS — E78.5 HYPERLIPIDEMIA, UNSPECIFIED HYPERLIPIDEMIA TYPE: ICD-10-CM

## 2020-09-16 DIAGNOSIS — Z80.3 FAMILY HISTORY OF BREAST CANCER IN MOTHER: ICD-10-CM

## 2020-09-16 PROBLEM — E66.3 OVERWEIGHT (BMI 25.0-29.9): Status: RESOLVED | Noted: 2019-11-01 | Resolved: 2020-09-16

## 2020-09-16 PROCEDURE — G0439 PPPS, SUBSEQ VISIT: HCPCS | Performed by: FAMILY MEDICINE

## 2020-09-16 PROCEDURE — 99214 OFFICE O/P EST MOD 30 MIN: CPT | Performed by: FAMILY MEDICINE

## 2020-09-16 PROCEDURE — 90662 IIV NO PRSV INCREASED AG IM: CPT | Performed by: FAMILY MEDICINE

## 2020-09-16 PROCEDURE — G0008 ADMIN INFLUENZA VIRUS VAC: HCPCS | Performed by: FAMILY MEDICINE

## 2020-09-16 RX ORDER — CELECOXIB 200 MG/1
CAPSULE ORAL
Qty: 30 CAPSULE | Refills: 1 | Status: SHIPPED | OUTPATIENT
Start: 2020-09-16 | End: 2020-11-10

## 2020-09-16 RX ORDER — ROSUVASTATIN CALCIUM 5 MG/1
5 TABLET, COATED ORAL DAILY
Qty: 30 TABLET | Refills: 5 | Status: SHIPPED | OUTPATIENT
Start: 2020-09-16 | End: 2021-01-09

## 2020-09-16 NOTE — PROGRESS NOTES
Assessment and Plan:     Problem List Items Addressed This Visit     None           Preventive health issues were discussed with patient, and age appropriate screening tests were ordered as noted in patient's After Visit Summary  Personalized health advice and appropriate referrals for health education or preventive services given if needed, as noted in patient's After Visit Summary  History of Present Illness:     Patient presents for Medicare Annual Wellness visit    Patient Care Team:  Ridge Cobos DO as PCP - Remedios Mock MD     Problem List:     Patient Active Problem List   Diagnosis    Allergic rhinitis    Bilateral cataracts    Chronic obstructive pulmonary disease ([address] )    Hyperlipidemia    Hypothyroidism    Osteoarthritis    Eustachian tube dysfunction    Rosacea    Healthcare maintenance    Overweight (BMI 25 0-29  9)    Family history of breast cancer in mother      Past Medical and Surgical History:     Past Medical History:   Diagnosis Date    Cataract     History of total hysterectomy      Past Surgical History:   Procedure Laterality Date    CATARACT EXTRACTION, BILATERAL      COLONOSCOPY      HYSTERECTOMY      REVISION COLOSTOMY  1976    SHOULDER SURGERY  2006    S/P dislocated shoulder      Family History:     Family History   Problem Relation Age of Onset   [address] [facility] Andrea Breast cancer Mother     Hypertension Mother     Alcohol abuse Father     Drug abuse Sister       Social History:        Social History     Socioeconomic History    Marital status: Single     Spouse name: Not on file    Number of children: Not on file    Years of education: Not on file    Highest education level: Not on file   Occupational History    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   Social Needs    Financial resource strain: Not on file    Food insecurity     Worry: Not on file     Inability: Not on file   Coshared needs     Medical: Not on file     Non-medical: Not on file   Tobacco Use    Smoking status: Former Smoker     Packs/day: 3 00     Years: 15 00     Pack years: 45 00     Types: Cigarettes     Start date:      Last attempt to quit:      Years since quittin 7    Smokeless tobacco: Never Used   Substance and Sexual Activity    Alcohol use: Not Currently     Comment: per [employer]-stopped drinking alcohol    Drug use: No     Comment: per [employer]-recovering from drug addiction    Sexual activity: Not Currently   Lifestyle    Physical activity     Days per week: Not on file     Minutes per session: Not on file    Stress: Not on file   Relationships    Social connections     Talks on phone: Not on file     Gets together: Not on file     Attends Jainism service: Not on file     Active member of club or organization: Not on file     Attends meetings of clubs or organizations: Not on file     Relationship status: Not on file    Intimate partner violence     Fear of current or ex partner: Not on file     Emotionally abused: Not on file     Physically abused: Not on file     Forced sexual activity: Not on file   Other Topics Concern    Not on file   Social History Narrative    Daily coffee consumption 3 cups per day      Medications and Allergies:     Current Outpatient Medications   Medication Sig Dispense Refill    fluticasone (FLONASE) 50 mcg/act nasal spray 2 sprays into each nostril daily      ipratropium (ATROVENT) 0 03 % nasal spray USE 2 SPRAYS BOTH NOSTRILS 3 TIMES A DAY AS NEEDED 30 mL 4    levothyroxine 50 mcg tablet Take 1 tablet (50 mcg total) by mouth daily in the early morning 90 tablet 3    loratadine-pseudoephedrine (CLARITIN-D 24 HOUR)  mg per 24 hr tablet Take 1 tablet by mouth daily      meloxicam (MOBIC) 15 mg tablet Take 1 tablet by mouth      metroNIDAZOLE (METROGEL) 0 75 % gel Apply to face twice daily for rosacea 45 g 1    montelukast (SINGULAIR) 10 mg tablet TAKE 1 TABLET BY MOUTH EVERY DAY 90 tablet 1    Omega-3 Fatty Acids (FISH OIL) 1,000 mg Take 3,000 mg by mouth daily      PREMARIN 0 45 MG tablet TAKE 1 TABLET (0 45 MG TOTAL) BY MOUTH DAILY  FOR 21 DAYS, THEN 7 DAYS OFF  6    RESTASIS 0 05 % ophthalmic emulsion       SYMBICORT 160-4 5 MCG/ACT inhaler Inhale 2 puffs 2 (two) times a day Rinse mouth after use  3 Inhaler 3    tiotropium (SPIRIVA HANDIHALER) 18 mcg inhalation capsule Place 1 capsule (18 mcg total) into inhaler and inhale daily 90 capsule 3    tiZANidine (ZANAFLEX) 2 mg tablet Take 1 tablet 3 times daily for muscle spasm or back/leg pain 30 tablet 1     No current facility-administered medications for this visit  Allergies   Allergen Reactions    Alcohol     Other      narcotics        Immunizations:     Immunization History   Administered Date(s) Administered    INFLUENZA 09/21/2017    Influenza Quadrivalent Preservative Free ID 09/21/2017    Influenza, injectable, quadrivalent, preservative free 0 5 mL 01/29/2020    Pneumococcal Polysaccharide PPV23 09/24/2019    Tuberculin Skin Test-PPD Intradermal 04/17/2013      Health Maintenance:         Topic Date Due    MAMMOGRAM  1955    Hepatitis C Screening  03/09/2021 (Originally 1955)     There are no preventive care reminders to display for this patient  Medicare Health Risk Assessment:     There were no vitals taken for this visit  Tianna Sommer is here for her Initial Wellness visit  Health Risk Assessment:   Patient rates overall health as very good  Patient feels that their physical health rating is same  Eyesight was rated as same  Hearing was rated as same  Patient feels that their emotional and mental health rating is same  Pain experienced in the last 7 days has been some  Patient's pain rating has been 3/10  Patient states that she has experienced no weight loss or gain in last 6 months  See 2nd note    Depression Screening:   PHQ-2 Score: 0      Fall Risk Screening:    In the past year, patient has experienced: history of falling in past year    Number of falls: 2 or more  Injured during fall?: No    Feels unsteady when standing or walking?: Yes    Worried about falling?: Yes      Urinary Incontinence Screening:   Patient has not leaked urine accidently in the last six months  Home Safety:  Patient does not have trouble with stairs inside or outside of their home  Patient has working smoke alarms and has working carbon monoxide detector  Home safety hazards include: none  Nutrition:   Current diet is Regular  Medications:   Patient is currently taking over-the-counter supplements  OTC medications include: see medication list  Patient is able to manage medications  Activities of Daily Living (ADLs)/Instrumental Activities of Daily Living (IADLs):   Walk and transfer into and out of bed and chair?: Yes  Dress and groom yourself?: Yes    Bathe or shower yourself?: Yes    Feed yourself?  Yes  Do your laundry/housekeeping?: Yes  Manage your money, pay your bills and track your expenses?: Yes  Make your own meals?: Yes    Do your own shopping?: Yes    Advance Care Planning:   Living will: No    Durable POA for healthcare: No    Advanced directive: No    Advanced directive counseling given: Yes    Five wishes given: Yes    Patient declined ACP directive: Yes    End of Life Decisions reviewed with patient: Yes    Provider agrees with end of life decisions: No      Cognitive Screening:   Provider or family/friend/caregiver concerned regarding cognition?: No    PREVENTIVE SCREENINGS      Cardiovascular Screening:    General: Screening Not Indicated and History Lipid Disorder      Diabetes Screening:     General: Screening Current      Colorectal Cancer Screening:     General: Screening Current      Cervical Cancer Screening:    General: Screening Not Indicated      Abdominal Aortic Aneurysm (AAA) Screening:        General: Screening Not Indicated      Lung Cancer Screening:     General: Screening Not Indicated      Hepatitis C Screening:    General: Screening Current      Sánchez Pryor DO  Falls Plan of Care: Balance, strength, and gait training instructions were provided

## 2020-09-16 NOTE — ASSESSMENT & PLAN NOTE
X-rays discussed lumbar and hip osteoarthritis  GI upset with Mobic changed to Celebrex    Continue physical therapy

## 2020-09-16 NOTE — PROGRESS NOTES
Assessment and Plan:  1  COPD, stable follow-up Pulmonary Medicine  2  DJD, lumbar spine and hip, GI upset with Mobic changed to Celebrex 20 mg daily  X-ray discussed blood work discussed  Continue physical therapy  3  Family history of breast cancer, importance of mammography discussed this is ordered may order was reprinted  4  Hyperlipidemia, start Crestor 5 mg daily  5  Health care maintenance, influenza vaccine given Medicare a [address] completed  6  Return in 3 months for office visit blood work sooner if needed       Problem List Items Addressed This Visit        Respiratory    Chronic obstructive pulmonary disease ([facility] )     Stable follow-up with Pulmonary Medicine            Musculoskeletal and Integument    Osteoarthritis     X-rays discussed lumbar and hip osteoarthritis  GI upset with Mobic changed to Celebrex  Continue physical therapy         Relevant Medications    celecoxib (CeleBREX) 200 mg capsule       Other    Hyperlipidemia     Start Crestor 5 mg daily         Relevant Medications    rosuvastatin (CRESTOR) 5 mg tablet    Healthcare maintenance     Influenza vaccine given, a [address] completed         Family history of breast cancer in mother     Mammogram repainted importance of this discussed patient agrees to complete           Other Visit Diagnoses     Encounter for immunization    -  Primary    Relevant Orders    influenza vaccine, high-dose, PF 0 7 mL (FLUZONE HIGH-DOSE) (Completed)                 Diagnoses and all orders for this visit:    Encounter for immunization  -     influenza vaccine, high-dose, PF 0 7 mL (FLUZONE HIGH-DOSE)    Chronic obstructive pulmonary disease, unspecified COPD type ([facility] )    Osteoarthritis of multiple joints, unspecified osteoarthritis type  -     celecoxib (CeleBREX) 200 mg capsule;  Take 1 capsule daily with food as needed for arthritis pain    Family history of breast cancer in mother    Healthcare maintenance    Hyperlipidemia, unspecified hyperlipidemia type  - rosuvastatin (CRESTOR) 5 mg tablet; Take 1 tablet (5 mg total) by mouth daily              Subjective:      Patient ID: Venice Frederick is a 77 y o  female  CC:    Chief Complaint   Patient presents with    Medicare Wellness Visit    Follow-up     review labs and imaging  HPI:    Patient tells me she is improving with her back and hip pain however after 2 or 3 days with meloxicam she has GI upset  Will discontinue this and change to Celebrex  X-ray was discussed  Blood work was discussed  Mammography was ordered may not completed reminded her to complete this  The following portions of the patient's history were reviewed and updated as appropriate: allergies, current medications, past family history, past medical history, past social history, past surgical history and problem list       Review of Systems   Constitutional: Negative  HENT: Negative  Eyes: Negative  Respiratory: Negative  Cardiovascular: Negative  Gastrointestinal: Negative  Endocrine: Negative  Genitourinary: Negative  Musculoskeletal:        HPI   Skin: Negative  Allergic/Immunologic: Negative  Neurological: Negative  Hematological: Negative  Psychiatric/Behavioral: Negative  Data to review:       Objective:    Vitals:    09/16/20 0859   BP: 122/70   BP Location: Left arm   Patient Position: Sitting   Cuff Size: Adult   Pulse: 70   Temp: (!) 97 1 °F (36 2 °C)   TempSrc: Temporal   Weight: 78 2 kg (172 lb 6 4 oz)   Height: 5' 10 4" (1 788 m)        Physical Exam  Vitals signs and nursing note reviewed  Constitutional:       Appearance: Normal appearance  HENT:      Head: Normocephalic and atraumatic  Eyes:      General: No scleral icterus  Neck:      Musculoskeletal: Normal range of motion and neck supple  Vascular: No carotid bruit  Cardiovascular:      Rate and Rhythm: Normal rate and regular rhythm  Heart sounds: Normal heart sounds     Pulmonary:      Effort: Pulmonary effort is normal       Breath sounds: Normal breath sounds  Abdominal:      General: Bowel sounds are normal       Palpations: Abdomen is soft  Tenderness: There is no abdominal tenderness  Musculoskeletal:      Right lower leg: No edema  Left lower leg: No edema  Skin:     General: Skin is warm and dry  Neurological:      General: No focal deficit present  Mental Status: She is alert  Psychiatric:         Mood and Affect: Mood normal               BMI Counseling: Body mass index is 24 46 kg/m²  The BMI is above normal  Nutrition recommendations include reducing intake of cholesterol

## 2020-09-16 NOTE — PATIENT INSTRUCTIONS
Fall Prevention   AMBULATORY CARE:   Fall prevention  includes ways to make your home and other areas safer  It also includes ways you can move more carefully to prevent a fall  Health conditions that cause changes in your blood pressure, vision, or muscle strength and coordination may increase your risk for falls  Medicines may also increase your risk for falls if they make you dizzy, weak, or sleepy  Call 911 or have someone else call if:   · You have fallen and are unconscious  · You have fallen and cannot move part of your body  Contact your healthcare provider if:   · You have fallen and have pain or a headache  · You have questions or concerns about your condition or care  Fall prevention tips:   · Stand or sit up slowly  This may help you keep your balance and prevent falls  · Use assistive devices as directed  Your healthcare provider may suggest that you use a cane or walker to help you keep your balance  You may need to have grab bars put in your bathroom near the toilet or in the shower  · Wear shoes that fit well and have soles that   Wear shoes both inside and outside  Use slippers with good   Do not wear shoes with high heels  · Wear a personal alarm  This is a device that allows you to call 911 if you fall and need help  Ask your healthcare provider for more information  · Stay active  Exercise can help strengthen your muscles and improve your balance  Your healthcare provider may recommend water aerobics or walking  He or she may also recommend physical therapy to improve your coordination  Never start an exercise program without talking to your healthcare provider first      · Manage your medical conditions  Keep all appointments with your healthcare providers  Visit your eye doctor as directed  Home safety tips:   · Add items to prevent falls in the bathroom  Put nonslip strips on your bath or shower floor to prevent you from slipping   Use a bath mat if you do not have carpet in the bathroom  This will prevent you from falling when you step out of the bath or shower  Use a shower seat so you do not need to stand while you shower  Sit on the toilet or a chair in your bathroom to dry yourself and put on clothing  This will prevent you from losing your balance from drying or dressing yourself while you are standing  · Keep paths clear  Remove books, shoes, and other objects from walkways and stairs  Place cords for telephones and lamps out of the way so that you do not need to walk over them  Tape them down if you cannot move them  Remove small rugs  If you cannot remove a rug, secure it with double-sided tape  This will prevent you from tripping  · Install bright lights in your home  Use night lights to help light paths to the bathroom or kitchen  Always turn on the light before you start walking  · Keep items you use often on shelves within reach  Do not use a step stool to help you reach an item  · Paint or place reflective tape on the edges of your stairs  This will help you see the stairs better  Follow up with your healthcare provider as directed:  Write down your questions so you remember to ask them during your visits  © 2017 [employer] Information is for End User's use only and may not be sold, redistributed or otherwise used for commercial purposes  All illustrations and images included in CareNotes® are the copyrighted property of [employer]  or Kyrie Sheppard  The above information is an  only  It is not intended as medical advice for individual conditions or treatments  Talk to your doctor, nurse or pharmacist before following any medical regimen to see if it is safe and effective for you  Low Fat Diet   AMBULATORY CARE:   A low-fat diet  is an eating plan that is low in total fat, unhealthy fat, and cholesterol   You may need to follow a low-fat diet if you have trouble digesting or absorbing fat  You may also need to follow this diet if you have high cholesterol  You can also lower your cholesterol by increasing the amount of fiber in your diet  Soluble fiber is a type of fiber that helps to decrease cholesterol levels  Different types of fat in food:   · Limit unhealthy fats  A diet that is high in cholesterol, saturated fat, and trans fat may cause unhealthy cholesterol levels  Unhealthy cholesterol levels increase your risk of heart disease  ¨ Cholesterol:  Limit intake of cholesterol to less than 200 mg per day  Cholesterol is found in meat, eggs, and dairy  ¨ Saturated fat:  Limit saturated fat to less than 7% of your total daily calories  Ask your dietitian how many calories you need each day  Saturated fat is found in butter, cheese, ice cream, whole milk, and palm oil  Saturated fat is also found in meat, such as beef, pork, chicken skin, and processed meats  Processed meats include sausage, hot dogs, and bologna  ¨ Trans fat:  Avoid trans fat as much as possible  Trans fat is used in fried and baked foods  Foods that say trans fat free on the label may still have up to 0 5 grams of trans fat per serving  · Include healthy fats  Replace foods that are high in saturated and trans fat with foods high in healthy fats  This may help to decrease high cholesterol levels  ¨ Monounsaturated fats: These are found in avocados, nuts, and vegetable oils, such as olive, canola, and sunflower oil  ¨ Polyunsaturated fats: These can be found in vegetable oils, such as soybean or corn oil  Omega-3 fats can help to decrease the risk of heart disease  Omega-3 fats are found in fish, such as salmon, herring, trout, and tuna  Omega-3 fats can also be found in plant foods, such as walnuts, flaxseed, soybeans, and canola oil    Foods to limit or avoid:   · Grains:      ¨ Snacks that are made with partially hydrogenated oils, such as chips, regular crackers, and butter-flavored popcorn    ¨ High-fat baked goods, such as biscuits, croissants, doughnuts, pies, cookies, and pastries    · Dairy:      ¨ Whole milk, 2% milk, and yogurt and ice cream made with whole milk    ¨ Half and half creamer, heavy cream, and whipping cream    ¨ Cheese, cream cheese, and sour cream    · Meats and proteins:      ¨ High-fat cuts of meat (T-bone steak, regular hamburger, and ribs)    ¨ Fried meat, poultry (turkey and chicken), and fish    ¨ Poultry (chicken and turkey) with skin    ¨ Cold cuts (salami or bologna), hot dogs, zamarripa, and sausage    ¨ Whole eggs and egg yolks    · Vegetables and fruits with added fat:      ¨ Fried vegetables or vegetables in butter or high-fat sauces, such as cream or cheese sauces    ¨ Fried fruit or fruit served with butter or cream    · Fats:      ¨ Butter, stick margarine, and shortening    ¨ Coconut, palm oil, and palm kernel oil  Foods to include:   · Grains:      ¨ Whole-grain breads, cereals, pasta, and brown rice    ¨ Low-fat crackers and pretzels    · Vegetables and fruits:      ¨ Fresh, frozen, or canned vegetables (no salt or low-sodium)    ¨ Fresh, frozen, dried, or canned fruit (canned in light syrup or fruit juice)    ¨ Avocado    · Low-fat dairy products:      ¨ Nonfat (skim) or 1% milk    ¨ Nonfat or low-fat cheese, yogurt, and cottage cheese    · Meats and proteins:      ¨ Chicken or turkey with no skin    ¨ Baked or broiled fish    ¨ Lean beef and pork (loin, round, extra lean hamburger)    ¨ Beans and peas, unsalted nuts, soy products    ¨ Egg whites and substitutes    ¨ Seeds and nuts    · Fats:      ¨ Unsaturated oil, such as canola, olive, peanut, soybean, or sunflower oil    ¨ Soft or liquid margarine and vegetable oil spread    ¨ Low-fat salad dressing  Other ways to decrease fat:   · Read food labels before you buy foods  Choose foods that have less than 30% of calories from fat  Choose low-fat or fat-free dairy products   Remember that fat free does not mean calorie free  These foods still contain calories, and too many calories can lead to weight gain  · Trim fat from meat and avoid fried food  Trim all visible fat from meat before you cook it  Remove the skin from poultry  Do not velazquez meat, fish, or poultry  Bake, roast, boil, or broil these foods instead  Avoid fried foods  Eat a baked potato instead of [place] fries  Steam vegetables instead of sautéing them in butter  · Add less fat to foods  Use imitation zamarripa bits on salads and baked potatoes instead of regular zamarripa bits  Use fat-free or low-fat salad dressings instead of regular dressings  Use low-fat or nonfat butter-flavored topping instead of regular butter or margarine on popcorn and other foods  Ways to decrease fat in recipes:  Replace high-fat ingredients with low-fat or nonfat ones  This may cause baked goods to be drier than usual  You may need to use nonfat cooking spray on pans to prevent food from sticking  You also may need to change the amount of other ingredients, such as water, in the recipe  Try the following:  · Use low-fat or light margarine instead of regular margarine or shortening  · Use lean ground turkey breast or chicken, or lean ground beef (less than 5% fat) instead of hamburger  · Add 1 teaspoon of canola oil to 8 ounces of skim milk instead of using cream or half and half  · Use grated zucchini, carrots, or apples in breads instead of coconut  · Use blenderized, low-fat cottage cheese, plain tofu, or low-fat ricotta cheese instead of cream cheese  · Use 1 egg white and 1 teaspoon of canola oil, or use ¼ cup (2 ounces) of fat-free egg substitute instead of a whole egg  · Replace half of the oil that is called for in a recipe with applesauce when you bake  Use 3 tablespoons of cocoa powder and 1 tablespoon of canola oil instead of a square of baking chocolate    How to increase fiber:  Eat enough high-fiber foods to get 20 to 30 grams of fiber every day  Slowly increase your fiber intake to avoid stomach cramps, gas, and other problems  · Eat 3 ounces of whole-grain foods each day  An ounce is about 1 slice of bread  Eat whole-grain breads, such as whole-wheat bread  Whole wheat, whole-wheat flour, or other whole grains should be listed as the first ingredient on the food label  Replace white flour with whole-grain flour or use half of each in recipes  Whole-grain flour is heavier than white flour, so you may have to add more yeast or baking powder  · Eat a high-fiber cereal for breakfast   Oatmeal is a good source of soluble fiber  Look for cereals that have bran or fiber in the name  Choose whole-grain products, such as brown rice, barley, and whole-wheat pasta  · Eat more beans, peas, and lentils  For example, add beans to soups or salads  Eat at least 5 cups of fruits and vegetables each day  Eat fruits and vegetables with the peel because the peel is high in fiber  © 2017 [employer] Information is for End User's use only and may not be sold, redistributed or otherwise used for commercial purposes  All illustrations and images included in CareNotes® are the copyrighted property of [employer]  or Kyrie Sheppard  The above information is an  only  It is not intended as medical advice for individual conditions or treatments  Talk to your doctor, nurse or pharmacist before following any medical regimen to see if it is safe and effective for you        Patient start Crestor 5 mg daily for cholesterol  Patient use Celebrex 200 mg 1 daily with food as needed for arthritis pain  Continue physical therapy  Follow low-fat diet  Complete mammography as ordered  Return in 3 months for office visit blood work sooner if needed

## 2020-09-17 ENCOUNTER — OFFICE VISIT (OUTPATIENT)
Dept: PHYSICAL THERAPY | Facility: REHABILITATION | Age: 65
End: 2020-09-17
Payer: MEDICARE

## 2020-09-17 DIAGNOSIS — M25.551 RIGHT HIP PAIN: ICD-10-CM

## 2020-09-17 DIAGNOSIS — G57.01 PIRIFORMIS SYNDROME OF RIGHT SIDE: Primary | ICD-10-CM

## 2020-09-17 PROCEDURE — 97112 NEUROMUSCULAR REEDUCATION: CPT | Performed by: PHYSICAL THERAPIST

## 2020-09-17 PROCEDURE — 97140 MANUAL THERAPY 1/> REGIONS: CPT | Performed by: PHYSICAL THERAPIST

## 2020-09-17 PROCEDURE — 97110 THERAPEUTIC EXERCISES: CPT | Performed by: PHYSICAL THERAPIST

## 2020-09-17 NOTE — PROGRESS NOTES
Daily Note     Today's date: 2020  Patient name: Juan C Avalos  : 1955  MRN: [id number]  Referring provider: Sue Li MD  Dx:   Encounter Diagnosis     ICD-10-CM    1  Piriformis syndrome of right side  G57 01    2  Right hip pain  M25 551                   Subjective: Pt notes achyness present, but tolerable  Pt notes fatigue but body fatigue greater than LE  Objective: See treatment diary below    Precautions: None        Manual  9/8/20  9/10/20 9/14/20  9/17/20      assessed Lspine 15 mins            grade III CPA/R UPA L1-4 15 mins   15 mins   15 mins   15 mins       hip posterior femur mobs 8 mins   8 mins   5 mins   5 mins       lat hip belt distract     3 mins   3 mins                          Neuro Re-Ed             Supine TrA  5"x10   hep     Prone hip ext     nv 3"x10 ea alt     bridges      3"x10  3"x15    Hip add isomet TrA      5"x10 5"x15                                               Therex            clamshells   3"x10   ytb 3"x15  ytb 3"2x10      hip abd SL   3"x10   3"x15  3"x15       upright bike     10 mins   10 mins                                                                                           Gait Training                                 Modalities             MHP prone 10 mins  10 mins  10 mins  10 mins                                        Assessment: Pt cont to improve hip ROM without increase in pain and sx's  Pt cont with restriction present in lumbar spine improved with manual mobs and leaves PT without pain  Plan: Continue per plan of care

## 2020-09-21 ENCOUNTER — OFFICE VISIT (OUTPATIENT)
Dept: PHYSICAL THERAPY | Facility: REHABILITATION | Age: 65
End: 2020-09-21
Payer: MEDICARE

## 2020-09-21 DIAGNOSIS — G57.01 PIRIFORMIS SYNDROME OF RIGHT SIDE: Primary | ICD-10-CM

## 2020-09-21 DIAGNOSIS — M25.551 RIGHT HIP PAIN: ICD-10-CM

## 2020-09-21 PROCEDURE — 97112 NEUROMUSCULAR REEDUCATION: CPT | Performed by: PHYSICAL THERAPIST

## 2020-09-21 PROCEDURE — 97140 MANUAL THERAPY 1/> REGIONS: CPT | Performed by: PHYSICAL THERAPIST

## 2020-09-21 PROCEDURE — 97110 THERAPEUTIC EXERCISES: CPT | Performed by: PHYSICAL THERAPIST

## 2020-09-21 NOTE — PROGRESS NOTES
Daily Note     Today's date: 2020  Patient name: Sabiha Davis  : 1955  MRN: [id number]  Referring provider: Monique Ewing MD  Dx:   Encounter Diagnosis     ICD-10-CM    1  Piriformis syndrome of right side  G57 01    2  Right hip pain  M25 551                   Subjective: Pt noted 2 days of relief after last session  Pt notes by  she had a slight increase in achyness and is "achy" this AM but overall no pain and improvement in sx's  Objective: See treatment diary below    Precautions: None        Manual  9/8/20  9/10/20 9/14/20  9/17/20  9/21/20     assessed Lspine 15 mins            grade III CPA/R UPA L1-4 15 mins   15 mins   15 mins   15 mins   10 mins    hip posterior femur mobs 8 mins   8 mins   5 mins   5 mins   3 mins    lat hip belt distract     3 mins   3 mins   2 mins                       Neuro Re-Ed             Supine TrA  5"x10   hep     Prone hip ext     nv 3"x10 ea alt  3"x15 ea   bridges      3"x10  3"x15 3"x20    Hip add isomet TrA      5"x10 5"x15  5"x20    DLS alt march supine        3"x10 ea                                  Therex            clamshells   3"x10   ytb 3"x15  ytb 3"2x10  ytb 3"2x15     hip abd SL   3"x10   3"x15  3"x15   hep    upright bike     10 mins   10 mins   10 mins                                                                                         Gait Training                                 Modalities             MHP prone 10 mins  10 mins  10 mins  10 mins  10 mins                                       Assessment: Pt's mobility has improved in lumbar spine since beginning PT and good progression of hip ROM  Pt does well with progression of DLS exercises which were painfree  Pt does note she is still cautious about negotiating stairs due to hx of leg "giving out" during stair climbing  Pt may benefit from further assessment of negotiation of stairs and include as part of POC  Plan: Continue per plan of care

## 2020-09-24 ENCOUNTER — OFFICE VISIT (OUTPATIENT)
Dept: PHYSICAL THERAPY | Facility: REHABILITATION | Age: 65
End: 2020-09-24
Payer: MEDICARE

## 2020-09-24 DIAGNOSIS — M25.551 RIGHT HIP PAIN: ICD-10-CM

## 2020-09-24 DIAGNOSIS — G57.01 PIRIFORMIS SYNDROME OF RIGHT SIDE: Primary | ICD-10-CM

## 2020-09-24 PROCEDURE — 97116 GAIT TRAINING THERAPY: CPT | Performed by: PHYSICAL THERAPIST

## 2020-09-24 PROCEDURE — 97140 MANUAL THERAPY 1/> REGIONS: CPT | Performed by: PHYSICAL THERAPIST

## 2020-09-24 PROCEDURE — 97112 NEUROMUSCULAR REEDUCATION: CPT | Performed by: PHYSICAL THERAPIST

## 2020-09-24 NOTE — PROGRESS NOTES
Daily Note     Today's date: 2020  Patient name: Michael Matias  : 1955  MRN: [id number]  Referring provider: Dimitri Box MD  Dx:   Encounter Diagnosis     ICD-10-CM    1  Piriformis syndrome of right side  G57 01    2  Right hip pain  M25 551                   Subjective: Pt notes overall improvement since beginning PT  Pt cont with on and off achyness present, however feels bilat yet right cont to be greater than left  Objective: See treatment diary below    Precautions: None        Manual  9/24/20  9/10/20 9/14/20  9/17/20  9/21/20     assessed Lspine            grade III CPA/R UPA L1-4 10 mins   15 mins   15 mins   15 mins   10 mins    hip posterior femur mobs 3 mins   8 mins   5 mins   5 mins   3 mins    lat hip belt distract  2 mins    3 mins   3 mins   2 mins                       Neuro Re-Ed             Supine TrA combo 5"x10   hep     Prone hip ext 3"x15     nv 3"x10 ea alt  3"x15 ea   bridges  hep    3"x10  3"x15 3"x20    Hip add isomet TrA  10"x10     5"x10 5"x15  5"x20    DLS alt march supine 3"x10 ea      3"x10 ea   Prone heel squeeze 5"x10         SLS 15"x2 ea                    Therex            clamshells  hep 3"x10   ytb 3"x15  ytb 3"2x10  ytb 3"2x15     hip abd SL  hep 3"x10   3"x15  3"x15   hep    upright bike  10 mins    10 mins   10 mins   10 mins                                                                                         Gait Training              step up fwd/step up lat 6 inch x10 ea                 Modalities             MHP prone 10 mins  10 mins  10 mins  10 mins  10 mins                                       Assessment: Pt does well with introduction of step up activity with good control and without increase in pain or malalignment  Pt issued step up ex to attempt as part of HEP and encouraged to perform slowly and only 1 step height  Pt also demo's slight diff with SLS, left greater than right  Pt's lumbar mobility cont to improve without reproducible pain  Plan: Continue per plan of care  RE next visit

## 2020-09-28 ENCOUNTER — EVALUATION (OUTPATIENT)
Dept: PHYSICAL THERAPY | Facility: REHABILITATION | Age: 65
End: 2020-09-28
Payer: MEDICARE

## 2020-09-28 DIAGNOSIS — M25.551 RIGHT HIP PAIN: ICD-10-CM

## 2020-09-28 DIAGNOSIS — G57.01 PIRIFORMIS SYNDROME OF RIGHT SIDE: Primary | ICD-10-CM

## 2020-09-28 PROCEDURE — 97140 MANUAL THERAPY 1/> REGIONS: CPT | Performed by: PHYSICAL THERAPIST

## 2020-09-28 NOTE — PROGRESS NOTES
PT Re-Evaluation     Today's date: 2020  Patient name: Brittany Douglas  : 1955  MRN: [id number]  Referring provider: Kristal Elmore MD  Dx:   Encounter Diagnosis     ICD-10-CM    1  Piriformis syndrome of right side  G57 01    2  Right hip pain  M25 551                   Assessment  Assessment details: Pt is progressing well with PT with increase in trunk ROM, increase in hip ROM and strength, improvement in funct tolerance to negotiation of stairs and ambulation up to 3 miles per day when walking with her dog  Pt does cont to have some achyness present in lateral to anterior right hip and weakness present which discourages her from performing negotiation of stairs  Pt would benefit from cont skilled PT to address these limitations and max funct  Impairments: impaired physical strength and pain with function    Goals  Goals  Short Term--4 weeks  1  Report of intermittent pain that does not exacerbate > 4/10-met  2  1/2 point increase in lower extremity strength  -partially met  3  Normal hip mobility  -met    Long Term--By Discharge  1  Patient will achieve expected FOTO score -partially met  2  Patient will ambulate 1 mile without aggravation of hip pain  -met  3  Patient will sleep without disturbance secondary to hip pain  -met      Plan  Patient would benefit from: skilled physical therapy  Planned therapy interventions: joint mobilization, manual therapy, strengthening, patient education and neuromuscular re-education  Frequency: 2x week  Duration in weeks: 2            Subjective Evaluation    History of Present Illness  Mechanism of injury: Pt reports improvement with ability to ambulate with her dog, greater than 1 miles at a time and up to 3 miles per day without pain  P tnotes less pain and sx's initially in AM, does note some achyness present throughout the day based on resting/sitting  Pt denies pain with change of positions in bed and denies sleep disturbances    Pt cont to note being cautious with negotiation of stairs due to fear of right LE giving out, as occurring when sx's began, however denies any giving out sx's in the last 4 weeks  Pt scores a 64 on her FOTO index  Pain  Current pain ratin  At best pain ratin  At worst pain ratin    Patient Goals  Patient goal: walking dog-met          Objective     Active Range of Motion     Lumbar   Flexion:  WFL  Extension:  WFL  Left lateral flexion:  WFL  Right lateral flexion:  [facility]    Additional Active Range of Motion Details  Pt demo's normal trunk ROM present  Pt denies pain except c/o mild increase in achyness present with repeated lumbar flexion  Sx's occur from right greater trocanter into right anterior thigh  Strength/Myotome Testing     Left Hip   Planes of Motion   Flexion: 5  Extension: 4  Abduction: 4  Adduction: 5  External rotation: 4+  Internal rotation: 4+    Right Hip   Planes of Motion   Flexion: 4+  Extension: 4  Abduction: 4+  Adduction: 5  External rotation: 5  Internal rotation: 5    Additional Strength Details  Pt notes "strain" with LE testing, no sharp pain present  Tests     Right Hip   Negative ANAND, FADIR, Patel, scour, SI compression and SI distraction  Mango: Negative  90/90 SLR: Negative  SLR: Negative  Additional Tests Details  Pt vee's increase in SLS on right to 22 sec  Pt demo's functional squat with good technique and mild achyness sx's into right LE at endrange  Pt is able to negotiate stairs reciprocally (6 inch-flight of 15) when ascending and descending without sx's         Precautions: None        Manual  20     RE   45 mins          grade III CPA/R UPA L1-4 10 mins    15 mins   15 mins   10 mins    hip posterior femur mobs 3 mins    5 mins   5 mins   3 mins    lat hip belt distract  2 mins    3 mins   3 mins   2 mins                       Neuro Re-Ed             Supine TrA combo   hep       Prone hip ext 3"x15     nv 3"x10 ea alt  3"x15 ea   bridges  hep    3"x10  3"x15 3"x20    Hip add isomet TrA  10"x10     5"x10 5"x15  5"x20    DLS alt march supine 3"x10 ea       3"x10 ea   Prone heel squeeze 5"x10            SLS 15"x2 ea                         Therex             clamshells  hep   ytb 3"x15  ytb 3"2x10  ytb 3"2x15     hip abd SL  hep   3"x15  3"x15   hep    upright bike  10 mins    10 mins   10 mins   10 mins                                                                                         Gait Training              step up fwd/step up lat 6 inch x10 ea                 Modalities             MHP prone 10 mins  10 mins  10 mins  10 mins  10 mins

## 2020-10-01 ENCOUNTER — OFFICE VISIT (OUTPATIENT)
Dept: PHYSICAL THERAPY | Facility: REHABILITATION | Age: 65
End: 2020-10-01
Payer: MEDICARE

## 2020-10-01 DIAGNOSIS — M25.551 RIGHT HIP PAIN: ICD-10-CM

## 2020-10-01 DIAGNOSIS — G57.01 PIRIFORMIS SYNDROME OF RIGHT SIDE: Primary | ICD-10-CM

## 2020-10-01 PROCEDURE — 97110 THERAPEUTIC EXERCISES: CPT | Performed by: PHYSICAL THERAPIST

## 2020-10-01 PROCEDURE — 97112 NEUROMUSCULAR REEDUCATION: CPT | Performed by: PHYSICAL THERAPIST

## 2020-10-01 PROCEDURE — 97140 MANUAL THERAPY 1/> REGIONS: CPT | Performed by: PHYSICAL THERAPIST

## 2020-10-06 ENCOUNTER — OFFICE VISIT (OUTPATIENT)
Dept: PHYSICAL THERAPY | Facility: REHABILITATION | Age: 65
End: 2020-10-06
Payer: MEDICARE

## 2020-10-06 DIAGNOSIS — M25.551 RIGHT HIP PAIN: ICD-10-CM

## 2020-10-06 DIAGNOSIS — G57.01 PIRIFORMIS SYNDROME OF RIGHT SIDE: Primary | ICD-10-CM

## 2020-10-06 PROCEDURE — 97110 THERAPEUTIC EXERCISES: CPT | Performed by: PHYSICAL THERAPIST

## 2020-10-06 PROCEDURE — 97140 MANUAL THERAPY 1/> REGIONS: CPT | Performed by: PHYSICAL THERAPIST

## 2020-10-06 PROCEDURE — 97112 NEUROMUSCULAR REEDUCATION: CPT | Performed by: PHYSICAL THERAPIST

## 2020-10-08 ENCOUNTER — OFFICE VISIT (OUTPATIENT)
Dept: PHYSICAL THERAPY | Facility: REHABILITATION | Age: 65
End: 2020-10-08
Payer: MEDICARE

## 2020-10-08 DIAGNOSIS — M25.551 RIGHT HIP PAIN: ICD-10-CM

## 2020-10-08 DIAGNOSIS — G57.01 PIRIFORMIS SYNDROME OF RIGHT SIDE: Primary | ICD-10-CM

## 2020-10-08 PROCEDURE — 97112 NEUROMUSCULAR REEDUCATION: CPT | Performed by: PHYSICAL THERAPIST

## 2020-10-08 PROCEDURE — 97110 THERAPEUTIC EXERCISES: CPT | Performed by: PHYSICAL THERAPIST

## 2020-10-08 PROCEDURE — 97140 MANUAL THERAPY 1/> REGIONS: CPT | Performed by: PHYSICAL THERAPIST

## 2020-10-09 DIAGNOSIS — J44.9 COPD (CHRONIC OBSTRUCTIVE PULMONARY DISEASE) (HCC): ICD-10-CM

## 2020-10-09 RX ORDER — TIOTROPIUM BROMIDE 18 UG/1
CAPSULE ORAL; RESPIRATORY (INHALATION)
Qty: 1 EACH | Refills: 3 | Status: SHIPPED | OUTPATIENT
Start: 2020-10-09 | End: 2021-01-12

## 2020-11-08 DIAGNOSIS — J44.9 CHRONIC OBSTRUCTIVE PULMONARY DISEASE, UNSPECIFIED COPD TYPE (HCC): ICD-10-CM

## 2020-11-09 RX ORDER — BUDESONIDE AND FORMOTEROL FUMARATE DIHYDRATE 160; 4.5 UG/1; UG/1
2 AEROSOL RESPIRATORY (INHALATION) 2 TIMES DAILY
Qty: 3 INHALER | Refills: 3 | Status: SHIPPED | OUTPATIENT
Start: 2020-11-09 | End: 2021-12-02 | Stop reason: SDUPTHER

## 2020-11-10 DIAGNOSIS — M15.9 OSTEOARTHRITIS OF MULTIPLE JOINTS, UNSPECIFIED OSTEOARTHRITIS TYPE: ICD-10-CM

## 2020-11-10 RX ORDER — CELECOXIB 200 MG/1
CAPSULE ORAL
Qty: 30 CAPSULE | Refills: 5 | Status: SHIPPED | OUTPATIENT
Start: 2020-11-10

## 2020-11-27 DIAGNOSIS — J30.9 ALLERGIC RHINITIS: ICD-10-CM

## 2020-11-27 RX ORDER — MONTELUKAST SODIUM 10 MG/1
TABLET ORAL
Qty: 90 TABLET | Refills: 3 | Status: SHIPPED | OUTPATIENT
Start: 2020-11-27 | End: 2021-11-22

## 2020-12-29 ENCOUNTER — LAB (OUTPATIENT)
Dept: LAB | Facility: CLINIC | Age: 65
End: 2020-12-29
Payer: MEDICARE

## 2020-12-29 DIAGNOSIS — Z80.3 FAMILY HISTORY OF BREAST CANCER IN MOTHER: ICD-10-CM

## 2020-12-29 DIAGNOSIS — Z00.00 HEALTHCARE MAINTENANCE: ICD-10-CM

## 2020-12-29 DIAGNOSIS — J44.9 CHRONIC OBSTRUCTIVE PULMONARY DISEASE, UNSPECIFIED COPD TYPE (HCC): ICD-10-CM

## 2020-12-29 DIAGNOSIS — M15.9 OSTEOARTHRITIS OF MULTIPLE JOINTS, UNSPECIFIED OSTEOARTHRITIS TYPE: ICD-10-CM

## 2020-12-29 DIAGNOSIS — E78.5 HYPERLIPIDEMIA, UNSPECIFIED HYPERLIPIDEMIA TYPE: ICD-10-CM

## 2020-12-29 LAB
ALBUMIN SERPL BCP-MCNC: 3.7 G/DL (ref 3.5–5)
ALP SERPL-CCNC: 25 U/L (ref 46–116)
ALT SERPL W P-5'-P-CCNC: 29 U/L (ref 12–78)
ANION GAP SERPL CALCULATED.3IONS-SCNC: 3 MMOL/L (ref 4–13)
AST SERPL W P-5'-P-CCNC: 20 U/L (ref 5–45)
BILIRUB SERPL-MCNC: 0.42 MG/DL (ref 0.2–1)
BUN SERPL-MCNC: 18 MG/DL (ref 5–25)
CALCIUM SERPL-MCNC: 10 MG/DL (ref 8.3–10.1)
CHLORIDE SERPL-SCNC: 108 MMOL/L (ref 100–108)
CHOLEST SERPL-MCNC: 212 MG/DL (ref 50–200)
CO2 SERPL-SCNC: 30 MMOL/L (ref 21–32)
CREAT SERPL-MCNC: 0.84 MG/DL (ref 0.6–1.3)
GFR SERPL CREATININE-BSD FRML MDRD: 72 ML/MIN/1.73SQ M
GLUCOSE P FAST SERPL-MCNC: 84 MG/DL (ref 65–99)
HDLC SERPL-MCNC: 84 MG/DL
LDLC SERPL CALC-MCNC: 112 MG/DL (ref 0–100)
POTASSIUM SERPL-SCNC: 4 MMOL/L (ref 3.5–5.3)
PROT SERPL-MCNC: 7.4 G/DL (ref 6.4–8.2)
SODIUM SERPL-SCNC: 141 MMOL/L (ref 136–145)
TRIGL SERPL-MCNC: 80 MG/DL

## 2020-12-29 PROCEDURE — 80061 LIPID PANEL: CPT

## 2020-12-29 PROCEDURE — 80053 COMPREHEN METABOLIC PANEL: CPT

## 2020-12-29 PROCEDURE — 36415 COLL VENOUS BLD VENIPUNCTURE: CPT

## 2021-01-03 DIAGNOSIS — J44.9 COPD (CHRONIC OBSTRUCTIVE PULMONARY DISEASE) (HCC): ICD-10-CM

## 2021-01-04 RX ORDER — TIOTROPIUM BROMIDE 18 UG/1
CAPSULE ORAL; RESPIRATORY (INHALATION)
Refills: 1 | OUTPATIENT
Start: 2021-01-04

## 2021-01-07 ENCOUNTER — TELEMEDICINE (OUTPATIENT)
Dept: FAMILY MEDICINE CLINIC | Facility: CLINIC | Age: 66
End: 2021-01-07
Payer: MEDICARE

## 2021-01-07 DIAGNOSIS — J30.9 ALLERGIC RHINITIS, UNSPECIFIED SEASONALITY, UNSPECIFIED TRIGGER: ICD-10-CM

## 2021-01-07 DIAGNOSIS — E78.2 MIXED HYPERLIPIDEMIA: ICD-10-CM

## 2021-01-07 DIAGNOSIS — E03.9 ACQUIRED HYPOTHYROIDISM: Primary | ICD-10-CM

## 2021-01-07 DIAGNOSIS — H69.80 DYSFUNCTION OF EUSTACHIAN TUBE, UNSPECIFIED LATERALITY: ICD-10-CM

## 2021-01-07 DIAGNOSIS — Z11.59 ENCOUNTER FOR HEPATITIS C SCREENING TEST FOR LOW RISK PATIENT: ICD-10-CM

## 2021-01-07 DIAGNOSIS — J44.9 CHRONIC OBSTRUCTIVE PULMONARY DISEASE, UNSPECIFIED COPD TYPE (HCC): ICD-10-CM

## 2021-01-07 DIAGNOSIS — Z00.00 HEALTHCARE MAINTENANCE: ICD-10-CM

## 2021-01-07 PROCEDURE — 99442 PR PHYS/QHP TELEPHONE EVALUATION 11-20 MIN: CPT | Performed by: FAMILY MEDICINE

## 2021-01-07 NOTE — PROGRESS NOTES
Virtual Brief Visit    Assessment/Plan:  1  Hypothyroidism, stable continue present therapy  2  Hyperlipidemia, stable LDL is much improved  Continue Crestor  Patient is tolerating this does 5 mg daily without side effects or problems  3  COPD, stable continue present therapy follows with pulmonology  4  Per allergic rhinitis/eustachian tube dysfunction, stable on Singulair/Atrovent as needed/Flonase  5  Health care maintenance, hepatitis-C screening discussed order was placed  6  Patient to return in 6 months for office visit blood work sooner if needed       Problem List Items Addressed This Visit        Endocrine    Acquired hypothyroidism - Primary     Stable continue present therapy         Relevant Orders    CBC    Comprehensive metabolic panel    Lipid Panel with Direct LDL reflex    T4    TSH, 3rd generation with Free T4 reflex    UA (URINE) with reflex to Scope       Respiratory    Allergic rhinitis     Stable on Singulair, Atrovent Flonase p r n  Relevant Orders    CBC    Comprehensive metabolic panel    Lipid Panel with Direct LDL reflex    T4    TSH, 3rd generation with Free T4 reflex    UA (URINE) with reflex to Scope    Chronic obstructive pulmonary disease (HCC)     Stable follow-up with pulmonology         Relevant Orders    CBC    Comprehensive metabolic panel    Lipid Panel with Direct LDL reflex    T4    TSH, 3rd generation with Free T4 reflex    UA (URINE) with reflex to Scope       Nervous and Auditory    Eustachian tube dysfunction     As above         Relevant Orders    CBC    Comprehensive metabolic panel    Lipid Panel with Direct LDL reflex    T4    TSH, 3rd generation with Free T4 reflex    UA (URINE) with reflex to Scope       Other    Mixed hyperlipidemia     Stable LDL has fallen from 144-112  Continue Crestor    Patient is tolerating this dose         Relevant Orders    CBC    Comprehensive metabolic panel    Lipid Panel with Direct LDL reflex    T4    TSH, 3rd generation with Free T4 reflex    UA (URINE) with reflex to Scope    Healthcare maintenance     Hepatitis C screening was discussed order was placed           Other Visit Diagnoses     Encounter for hepatitis C screening test for low risk patient        Relevant Orders    Hepatitis C antibody                Reason for visit is   Chief Complaint   Patient presents with    Virtual Brief Visit     pt states follow up to review lab results  JOSEFINA Wilson    Virtual Brief Visit        Encounter provider Tylor Galeano DO    Provider located at [address] [place] [address] PRIMARY CARE  [facility] [address]    Recent Visits  No visits were found meeting these conditions  Showing recent visits within past 7 days and meeting all other requirements     Today's Visits  Date Type Provider Dept   01/07/21 Telemedicine Tylor Galeano DO [facility]   Showing today's visits and meeting all other requirements     Future Appointments  No visits were found meeting these conditions  Showing future appointments within next 150 days and meeting all other requirements        After connecting through telephone and patient was informed that this is not a secure, HIPAA-compliant platform  She agrees to proceed  , the patient was identified by name and date of birth  Tiffanie James was informed that this is a telemedicine visit and that the visit is being conducted through telephone and patient was informed that this is not a secure, HIPAA-compliant platform  She agrees to proceed     My office door was closed  No one else was in the room  She acknowledged consent and understanding of privacy and security of the platform  The patient has agreed to participate and understands she can discontinue the visit at any time  Patient is aware this is a billable service       Conner James is a 77 y o  female HPI as below    Of note, patient is scheduled for video visit patient's SIPX duo was not working  So this is just a phone call  Patient doing well without any medical complaints concerns at the present time  She just wished to discuss her blood work  Past Medical History:   Diagnosis Date    Cataract     History of total hysterectomy        Past Surgical History:   Procedure Laterality Date    CATARACT EXTRACTION, BILATERAL      COLONOSCOPY      HYSTERECTOMY      REVISION COLOSTOMY  1976    SHOULDER SURGERY  2006    S/P dislocated shoulder       Current Outpatient Medications   Medication Sig Dispense Refill    celecoxib (CeleBREX) 200 mg capsule TAKE 1 CAPSULE DAILY WITH FOOD AS NEEDED FOR ARTHRITIS PAIN 30 capsule 5    fluticasone (FLONASE) 50 mcg/act nasal spray 2 sprays into each nostril daily      ipratropium (ATROVENT) 0 03 % nasal spray USE 2 SPRAYS BOTH NOSTRILS 3 TIMES A DAY AS NEEDED 30 mL 4    levothyroxine 50 mcg tablet Take 1 tablet (50 mcg total) by mouth daily in the early morning 90 tablet 3    loratadine-pseudoephedrine (CLARITIN-D 24 HOUR)  mg per 24 hr tablet Take 1 tablet by mouth daily      metroNIDAZOLE (METROGEL) 0 75 % gel Apply to face twice daily for rosacea 45 g 1    montelukast (SINGULAIR) 10 mg tablet TAKE 1 TABLET BY MOUTH EVERY DAY 90 tablet 3    Omega-3 Fatty Acids (FISH OIL) 1,000 mg Take 3,000 mg by mouth daily      PREMARIN 0 45 MG tablet TAKE 1 TABLET (0 45 MG TOTAL) BY MOUTH DAILY   FOR 21 DAYS, THEN 7 DAYS OFF  6    RESTASIS 0 05 % ophthalmic emulsion       rosuvastatin (CRESTOR) 5 mg tablet Take 1 tablet (5 mg total) by mouth daily 30 tablet 5    Spiriva HandiHaler 18 MCG inhalation capsule INHALE 1 CAPSULE VIA HANDIHALER ONCE DAILY AT THE SAME TIME EVERY DAY 1 each 3    Symbicort 160-4 5 MCG/ACT inhaler INHALE 2 PUFFS 2 (TWO) TIMES A DAY RINSE MOUTH AFTER USE  3 Inhaler 3    tiZANidine (ZANAFLEX) 2 mg tablet Take 1 tablet 3 times daily for muscle spasm or back/leg pain 30 tablet 1     No current facility-administered medications for this visit  Allergies   Allergen Reactions    Alcohol     Other      narcotics      Mobic [Meloxicam] GI Intolerance       Review of Systems   Constitutional: Negative  HENT: Negative  Eyes: Negative  Respiratory: Negative  Cardiovascular: Negative  Gastrointestinal: Negative  Endocrine: Negative  Genitourinary: Negative  Musculoskeletal: Negative  Skin: Negative  Allergic/Immunologic: Negative  Neurological: Negative  Hematological: Negative  Psychiatric/Behavioral: Negative  There were no vitals filed for this visit  It was my intent to perform this visit via video technology but the patient was not able to do a video connection so the visit was completed via audio telephone only  I spent 17 minutes directly with the patient during this visit    VIRTUAL VISIT DISCLAIMER    Mona Rich acknowledges that she has consented to an online visit or consultation  She understands that the online visit is based solely on information provided by her, and that, in the absence of a face-to-face physical evaluation by the physician, the diagnosis she receives is both limited and provisional in terms of accuracy and completeness  This is not intended to replace a full medical face-to-face evaluation by the physician  Mona Rich understands and accepts these terms

## 2021-01-09 DIAGNOSIS — E78.5 HYPERLIPIDEMIA, UNSPECIFIED HYPERLIPIDEMIA TYPE: ICD-10-CM

## 2021-01-09 RX ORDER — ROSUVASTATIN CALCIUM 5 MG/1
TABLET, COATED ORAL
Qty: 90 TABLET | Refills: 1 | Status: SHIPPED | OUTPATIENT
Start: 2021-01-09 | End: 2021-09-13

## 2021-01-11 DIAGNOSIS — J44.9 COPD (CHRONIC OBSTRUCTIVE PULMONARY DISEASE) (HCC): ICD-10-CM

## 2021-01-12 RX ORDER — TIOTROPIUM BROMIDE 18 UG/1
CAPSULE ORAL; RESPIRATORY (INHALATION)
Qty: 90 CAPSULE | Refills: 1 | Status: SHIPPED | OUTPATIENT
Start: 2021-01-12 | End: 2021-07-13 | Stop reason: SDUPTHER

## 2021-03-10 DIAGNOSIS — Z23 ENCOUNTER FOR IMMUNIZATION: ICD-10-CM

## 2021-03-23 ENCOUNTER — IMMUNIZATIONS (OUTPATIENT)
Dept: FAMILY MEDICINE CLINIC | Facility: HOSPITAL | Age: 66
End: 2021-03-23

## 2021-03-23 DIAGNOSIS — Z23 ENCOUNTER FOR IMMUNIZATION: Primary | ICD-10-CM

## 2021-03-23 PROCEDURE — 91301 SARS-COV-2 / COVID-19 MRNA VACCINE (MODERNA) 100 MCG: CPT

## 2021-03-23 PROCEDURE — 0011A SARS-COV-2 / COVID-19 MRNA VACCINE (MODERNA) 100 MCG: CPT

## 2021-04-12 DIAGNOSIS — H65.03 BILATERAL ACUTE SEROUS OTITIS MEDIA, RECURRENCE NOT SPECIFIED: ICD-10-CM

## 2021-04-12 DIAGNOSIS — J30.9 ALLERGIC RHINITIS, UNSPECIFIED SEASONALITY, UNSPECIFIED TRIGGER: ICD-10-CM

## 2021-04-12 DIAGNOSIS — H69.80 DYSFUNCTION OF EUSTACHIAN TUBE, UNSPECIFIED LATERALITY: ICD-10-CM

## 2021-04-12 RX ORDER — IPRATROPIUM BROMIDE 21 UG/1
SPRAY, METERED NASAL
Qty: 30 ML | Refills: 5 | Status: SHIPPED | OUTPATIENT
Start: 2021-04-12

## 2021-04-22 ENCOUNTER — IMMUNIZATIONS (OUTPATIENT)
Dept: FAMILY MEDICINE CLINIC | Facility: HOSPITAL | Age: 66
End: 2021-04-22

## 2021-04-22 DIAGNOSIS — Z23 ENCOUNTER FOR IMMUNIZATION: Primary | ICD-10-CM

## 2021-04-22 PROCEDURE — 0012A SARS-COV-2 / COVID-19 MRNA VACCINE (MODERNA) 100 MCG: CPT

## 2021-04-22 PROCEDURE — 91301 SARS-COV-2 / COVID-19 MRNA VACCINE (MODERNA) 100 MCG: CPT

## 2021-05-11 DIAGNOSIS — E03.9 HYPOTHYROIDISM, UNSPECIFIED TYPE: ICD-10-CM

## 2021-05-11 RX ORDER — LEVOTHYROXINE SODIUM 0.05 MG/1
50 TABLET ORAL
Qty: 90 TABLET | Refills: 3 | Status: SHIPPED | OUTPATIENT
Start: 2021-05-11 | End: 2022-04-11

## 2021-07-12 DIAGNOSIS — J44.9 COPD (CHRONIC OBSTRUCTIVE PULMONARY DISEASE) (HCC): ICD-10-CM

## 2021-07-12 RX ORDER — TIOTROPIUM BROMIDE 18 UG/1
CAPSULE ORAL; RESPIRATORY (INHALATION)
Qty: 30 CAPSULE | Refills: 1 | OUTPATIENT
Start: 2021-07-12

## 2021-07-13 RX ORDER — TIOTROPIUM BROMIDE 18 UG/1
18 CAPSULE ORAL; RESPIRATORY (INHALATION) DAILY
Qty: 30 CAPSULE | Refills: 0 | Status: SHIPPED | OUTPATIENT
Start: 2021-07-13 | End: 2021-07-16 | Stop reason: SDUPTHER

## 2021-07-16 ENCOUNTER — OFFICE VISIT (OUTPATIENT)
Dept: PULMONOLOGY | Facility: CLINIC | Age: 66
End: 2021-07-16
Payer: MEDICARE

## 2021-07-16 VITALS
HEART RATE: 73 BPM | BODY MASS INDEX: 23.85 KG/M2 | SYSTOLIC BLOOD PRESSURE: 128 MMHG | DIASTOLIC BLOOD PRESSURE: 76 MMHG | RESPIRATION RATE: 18 BRPM | WEIGHT: 166.6 LBS | OXYGEN SATURATION: 98 % | HEIGHT: 70 IN | TEMPERATURE: 97.5 F

## 2021-07-16 DIAGNOSIS — J30.9 ALLERGIC RHINITIS, UNSPECIFIED SEASONALITY, UNSPECIFIED TRIGGER: ICD-10-CM

## 2021-07-16 DIAGNOSIS — J44.9 CHRONIC OBSTRUCTIVE PULMONARY DISEASE, UNSPECIFIED COPD TYPE (HCC): Primary | ICD-10-CM

## 2021-07-16 DIAGNOSIS — J44.9 COPD (CHRONIC OBSTRUCTIVE PULMONARY DISEASE) (HCC): ICD-10-CM

## 2021-07-16 PROCEDURE — 99214 OFFICE O/P EST MOD 30 MIN: CPT | Performed by: INTERNAL MEDICINE

## 2021-07-16 RX ORDER — TIOTROPIUM BROMIDE 18 UG/1
18 CAPSULE ORAL; RESPIRATORY (INHALATION) DAILY
Qty: 90 CAPSULE | Refills: 3 | Status: SHIPPED | OUTPATIENT
Start: 2021-07-16

## 2021-07-16 NOTE — PROGRESS NOTES
Office Progress Note - Pulmonary    Buddy Phan 77 y o  female MRN: [id number]    Encounter: [id number]      Assessment:   Chronic obstructive pulmonary disease   Allergic rhinitis  Plan:    Symbicort 160/4 5, 2 inhalations twice a day   Spiriva once a day   Albuterol rescue inhaler 2 inhalations 4 times a day as needed   Fluticasone nasal spray 2 sprays to each nostril once a day   Follow-up in 6 months  Discussion:   Patient has bronchial asthma is in remission  I have maintained her on the Symbicort 160/4 5, 2 inhalations twice a day and Spiriva once a day  She will use the albuterol rescue inhaler 2 inhalations 4 times a day as needed  The allergic rhinitis is well treated  I have maintained her on the fluticasone nasal spray 2 sprays to each nostril once a day  I have provided with samples of Spiriva today  I will see her in 6 months in a follow-up visit  Subjective: The patient is here for a follow-up visit  She denies cough, wheezing or sputum production  Denies chest pain  She has no nocturnal symptoms  She is using Symbicort 160/4 5, 2 inhalations twice a day and Spiriva once a day  She rarely needs to use the albuterol rescue inhaler  She denies nasal congestion or postnasal dripping  She is using fluticasone nasal spray 2 sprays to each nostril once a day  Review of systems:  A 12 point system review is done and aside from what is stated above the rest of the review of systems is negative  Family history and social history are reviewed  Medications list is reviewed  Vitals: Blood pressure 128/76, pulse 73, temperature 97 5 °F (36 4 °C), resp   rate 18, height 5' 10" (1 778 m), weight 75 6 kg (166 lb 9 6 oz), SpO2 98 %, not currently breastfeeding ,     Physical Exam  Gen: Awake, alert, oriented x 3, no acute distress  HEENT: Mucous membranes moist, no oral lesions, no thrush  NECK: No accessory muscle use, JVP not elevated  Cardiac: Regular, single S1, single S2, no murmurs, no rubs, no gallops  Lungs:  Diminished breath sounds  No wheezing or rhonchi  Abdomen: normoactive bowel sounds, soft nontender, nondistended, no rebound or rigidity, no guarding  Extremities: no cyanosis, no clubbing, no edema  Neuro:  Grossly nonfocal   Skin:  No rash

## 2021-09-12 DIAGNOSIS — E78.5 HYPERLIPIDEMIA, UNSPECIFIED HYPERLIPIDEMIA TYPE: ICD-10-CM

## 2021-09-13 RX ORDER — ROSUVASTATIN CALCIUM 5 MG/1
TABLET, COATED ORAL
Qty: 90 TABLET | Refills: 1 | Status: SHIPPED | OUTPATIENT
Start: 2021-09-13 | End: 2022-03-04

## 2021-10-06 ENCOUNTER — OFFICE VISIT (OUTPATIENT)
Dept: FAMILY MEDICINE CLINIC | Facility: CLINIC | Age: 66
End: 2021-10-06
Payer: MEDICARE

## 2021-10-06 VITALS
DIASTOLIC BLOOD PRESSURE: 70 MMHG | BODY MASS INDEX: 24.2 KG/M2 | HEART RATE: 72 BPM | RESPIRATION RATE: 16 BRPM | SYSTOLIC BLOOD PRESSURE: 122 MMHG | HEIGHT: 70 IN | WEIGHT: 169 LBS

## 2021-10-06 DIAGNOSIS — M15.9 OSTEOARTHRITIS OF MULTIPLE JOINTS, UNSPECIFIED OSTEOARTHRITIS TYPE: ICD-10-CM

## 2021-10-06 DIAGNOSIS — D23.22: ICD-10-CM

## 2021-10-06 DIAGNOSIS — Z80.3 FAMILY HISTORY OF BREAST CANCER IN MOTHER: ICD-10-CM

## 2021-10-06 DIAGNOSIS — L71.9 ROSACEA: ICD-10-CM

## 2021-10-06 DIAGNOSIS — E03.9 ACQUIRED HYPOTHYROIDISM: Primary | ICD-10-CM

## 2021-10-06 DIAGNOSIS — E78.2 MIXED HYPERLIPIDEMIA: ICD-10-CM

## 2021-10-06 DIAGNOSIS — Z00.00 HEALTHCARE MAINTENANCE: ICD-10-CM

## 2021-10-06 DIAGNOSIS — Z78.0 POSTMENOPAUSAL ESTROGEN DEFICIENCY: ICD-10-CM

## 2021-10-06 DIAGNOSIS — Z12.31 VISIT FOR SCREENING MAMMOGRAM: ICD-10-CM

## 2021-10-06 DIAGNOSIS — J44.9 CHRONIC OBSTRUCTIVE PULMONARY DISEASE, UNSPECIFIED COPD TYPE (HCC): ICD-10-CM

## 2021-10-06 PROCEDURE — G0439 PPPS, SUBSEQ VISIT: HCPCS | Performed by: FAMILY MEDICINE

## 2021-10-06 PROCEDURE — 99214 OFFICE O/P EST MOD 30 MIN: CPT | Performed by: FAMILY MEDICINE

## 2021-10-06 PROCEDURE — 1123F ACP DISCUSS/DSCN MKR DOCD: CPT | Performed by: FAMILY MEDICINE

## 2021-10-28 ENCOUNTER — TELEPHONE (OUTPATIENT)
Dept: PULMONOLOGY | Facility: CLINIC | Age: 66
End: 2021-10-28

## 2021-11-02 PROCEDURE — 88304 TISSUE EXAM BY PATHOLOGIST: CPT | Performed by: PATHOLOGY

## 2021-11-21 DIAGNOSIS — J30.9 ALLERGIC RHINITIS: ICD-10-CM

## 2021-11-22 RX ORDER — MONTELUKAST SODIUM 10 MG/1
TABLET ORAL
Qty: 90 TABLET | Refills: 3 | Status: SHIPPED | OUTPATIENT
Start: 2021-11-22

## 2021-12-02 DIAGNOSIS — J44.9 CHRONIC OBSTRUCTIVE PULMONARY DISEASE, UNSPECIFIED COPD TYPE (HCC): ICD-10-CM

## 2021-12-02 RX ORDER — BUDESONIDE AND FORMOTEROL FUMARATE DIHYDRATE 160; 4.5 UG/1; UG/1
2 AEROSOL RESPIRATORY (INHALATION) 2 TIMES DAILY
Qty: 30.6 G | Refills: 0 | Status: SHIPPED | OUTPATIENT
Start: 2021-12-02 | End: 2022-05-16

## 2022-01-12 ENCOUNTER — OFFICE VISIT (OUTPATIENT)
Dept: PULMONOLOGY | Facility: CLINIC | Age: 67
End: 2022-01-12
Payer: MEDICARE

## 2022-01-12 VITALS
DIASTOLIC BLOOD PRESSURE: 72 MMHG | SYSTOLIC BLOOD PRESSURE: 114 MMHG | WEIGHT: 174 LBS | HEIGHT: 70 IN | HEART RATE: 80 BPM | BODY MASS INDEX: 24.91 KG/M2 | TEMPERATURE: 98.2 F | OXYGEN SATURATION: 98 %

## 2022-01-12 DIAGNOSIS — J30.9 ALLERGIC RHINITIS, UNSPECIFIED SEASONALITY, UNSPECIFIED TRIGGER: ICD-10-CM

## 2022-01-12 DIAGNOSIS — J44.9 CHRONIC OBSTRUCTIVE PULMONARY DISEASE, UNSPECIFIED COPD TYPE (HCC): Primary | ICD-10-CM

## 2022-01-12 PROCEDURE — 99214 OFFICE O/P EST MOD 30 MIN: CPT | Performed by: INTERNAL MEDICINE

## 2022-01-12 RX ORDER — MULTIVIT WITH MINERALS/LUTEIN
1000 TABLET ORAL DAILY
COMMUNITY

## 2022-01-12 RX ORDER — MULTIVIT-MIN/IRON FUM/FOLIC AC 7.5 MG-4
1 TABLET ORAL DAILY
COMMUNITY

## 2022-01-12 NOTE — PROGRESS NOTES
Office Progress Note - Pulmonary    Sabiha Davis 74 y o  female MRN: [id number]    Encounter: [id number]      Assessment:   Chronic obstructive pulmonary disease   Allergic rhinitis  Plan:     Symbicort 160/4 5, 2 inhalations twice a day   Spiriva once a day   Albuterol rescue inhaler 2 inhalations 4 times a day as needed   Claritin 10 mg once a day   Fluticasone nasal spray 2 sprays to each nostril once a day   Follow-up in 6 months  Discussion:   The patient's COPD is in remission  I have maintained her on the Symbicort 160/4 5, 2 inhalations twice a day  Also on Spiriva once a day  I have provided her with Spiriva samples  She will use the albuterol rescue inhaler 2 inhalations 4 times a day as needed  Her allergic rhinitis is well treated  I have recommended to use Claritin 10 mg once a day instead of the Claritin-D  Also have maintained her on the fluticasone nasal spray 2 sprays to each nostril once a day  She has received influenza vaccine for this season  She has received the 1st dose of the COVID vaccine  She is skeptical about the booster dose  I will see her in 6 months in a follow-up visit  Subjective: The patient is here for a follow-up visit  She denies any significant cough, wheezing or sputum production  Denies any chest pain or palpitations  She is fairly active and walking her dog about 3 times a day  She has no nocturnal symptoms  She is using Symbicort twice a day and Spiriva once a day  She rarely needs to use her albuterol  Also she is taking Claritin D once a day and fluticasone nasal spray 2 sprays to each nostril once a day  She has no postnasal dripping or nasal congestion  Review of systems:  A 12 point system review is done and aside from what is stated above the rest of the review of systems is negative  Family history and social history are reviewed  Medications list is reviewed        Vitals: Blood pressure 114/72, pulse 80, temperature 98 2 °F (36 8 °C), temperature source Tympanic, height 5' 10" (1 778 m), weight 78 9 kg (174 lb), SpO2 98 %, not currently breastfeeding ,     Physical Exam  Gen: Awake, alert, oriented x 3, no acute distress  HEENT: Mucous membranes moist, no oral lesions, no thrush  NECK: No accessory muscle use, JVP not elevated  Cardiac: Regular, single S1, single S2, no murmurs, no rubs, no gallops  Lungs:  Clear breath sounds  No wheezing or rhonchi  Abdomen: normoactive bowel sounds, soft nontender, nondistended, no rebound or rigidity, no guarding  Extremities: no cyanosis, no clubbing, no edema  Neuro:  Grossly nonfocal   Skin:  No rash      Lab Results   Component Value Date    WBC 6 40 08/28/2020    HGB 13 7 08/28/2020    HCT 42 7 08/28/2020    MCV 95 08/28/2020     08/28/2020     Lab Results   Component Value Date    SODIUM 141 12/29/2020    K 4 0 12/29/2020     12/29/2020    CO2 30 12/29/2020    BUN 18 12/29/2020    CREATININE 0 84 12/29/2020    CALCIUM 10 0 12/29/2020

## 2022-03-04 DIAGNOSIS — E78.5 HYPERLIPIDEMIA, UNSPECIFIED HYPERLIPIDEMIA TYPE: ICD-10-CM

## 2022-03-04 RX ORDER — ROSUVASTATIN CALCIUM 5 MG/1
TABLET, COATED ORAL
Qty: 90 TABLET | Refills: 1 | Status: SHIPPED | OUTPATIENT
Start: 2022-03-04

## 2022-04-11 DIAGNOSIS — E03.9 HYPOTHYROIDISM, UNSPECIFIED TYPE: ICD-10-CM

## 2022-04-11 RX ORDER — LEVOTHYROXINE SODIUM 0.05 MG/1
50 TABLET ORAL
Qty: 90 TABLET | Refills: 3 | Status: SHIPPED | OUTPATIENT
Start: 2022-04-11

## 2022-04-11 NOTE — TELEPHONE ENCOUNTER
Requested Prescriptions     Pending Prescriptions Disp Refills    levothyroxine 50 mcg tablet [Pharmacy Med Name: LEVOTHYROXINE 50 MCG TABLET] 90 tablet 3     Sig: TAKE 1 TABLET (50 MCG TOTAL) BY MOUTH DAILY IN THE EARLY MORNING     LOV 10/6/21, F/U 4/13/22, labs pending

## 2022-04-13 ENCOUNTER — TELEPHONE (OUTPATIENT)
Dept: FAMILY MEDICINE CLINIC | Facility: CLINIC | Age: 67
End: 2022-04-13

## 2022-04-13 NOTE — TELEPHONE ENCOUNTER
Patient did not show for scheduled appointment today, 04/13/2022    Please have patient complete blood work that was ordered and make follow-up appointment in the next few weeks, 30 minute complex patient visit

## 2022-04-19 ENCOUNTER — APPOINTMENT (OUTPATIENT)
Dept: LAB | Facility: CLINIC | Age: 67
End: 2022-04-19
Payer: MEDICARE

## 2022-04-19 DIAGNOSIS — Z80.3 FAMILY HISTORY OF BREAST CANCER IN MOTHER: ICD-10-CM

## 2022-04-19 DIAGNOSIS — M15.9 OSTEOARTHRITIS OF MULTIPLE JOINTS, UNSPECIFIED OSTEOARTHRITIS TYPE: ICD-10-CM

## 2022-04-19 DIAGNOSIS — D23.22: ICD-10-CM

## 2022-04-19 DIAGNOSIS — E78.2 MIXED HYPERLIPIDEMIA: ICD-10-CM

## 2022-04-19 DIAGNOSIS — J44.9 CHRONIC OBSTRUCTIVE PULMONARY DISEASE, UNSPECIFIED COPD TYPE (HCC): ICD-10-CM

## 2022-04-19 DIAGNOSIS — Z78.0 POSTMENOPAUSAL ESTROGEN DEFICIENCY: ICD-10-CM

## 2022-04-19 DIAGNOSIS — E03.9 ACQUIRED HYPOTHYROIDISM: ICD-10-CM

## 2022-04-19 DIAGNOSIS — L71.9 ROSACEA: ICD-10-CM

## 2022-04-19 LAB
ALBUMIN SERPL BCP-MCNC: 3.6 G/DL (ref 3.5–5)
ALP SERPL-CCNC: 56 U/L (ref 46–116)
ALT SERPL W P-5'-P-CCNC: 32 U/L (ref 12–78)
ANION GAP SERPL CALCULATED.3IONS-SCNC: 6 MMOL/L (ref 4–13)
AST SERPL W P-5'-P-CCNC: 26 U/L (ref 5–45)
BILIRUB SERPL-MCNC: 0.44 MG/DL (ref 0.2–1)
BILIRUB UR QL STRIP: NEGATIVE
BUN SERPL-MCNC: 15 MG/DL (ref 5–25)
CALCIUM SERPL-MCNC: 9.7 MG/DL (ref 8.3–10.1)
CHLORIDE SERPL-SCNC: 109 MMOL/L (ref 100–108)
CHOLEST SERPL-MCNC: 211 MG/DL
CLARITY UR: CLEAR
CO2 SERPL-SCNC: 25 MMOL/L (ref 21–32)
COLOR UR: NORMAL
CREAT SERPL-MCNC: 0.83 MG/DL (ref 0.6–1.3)
ERYTHROCYTE [DISTWIDTH] IN BLOOD BY AUTOMATED COUNT: 13.2 % (ref 11.6–15.1)
GFR SERPL CREATININE-BSD FRML MDRD: 72 ML/MIN/1.73SQ M
GLUCOSE P FAST SERPL-MCNC: 91 MG/DL (ref 65–99)
GLUCOSE UR STRIP-MCNC: NEGATIVE MG/DL
HCT VFR BLD AUTO: 42.4 % (ref 34.8–46.1)
HDLC SERPL-MCNC: 85 MG/DL
HGB BLD-MCNC: 14.1 G/DL (ref 11.5–15.4)
HGB UR QL STRIP.AUTO: NEGATIVE
KETONES UR STRIP-MCNC: NEGATIVE MG/DL
LDLC SERPL CALC-MCNC: 106 MG/DL (ref 0–100)
LEUKOCYTE ESTERASE UR QL STRIP: NEGATIVE
MCH RBC QN AUTO: 30 PG (ref 26.8–34.3)
MCHC RBC AUTO-ENTMCNC: 33.3 G/DL (ref 31.4–37.4)
MCV RBC AUTO: 90 FL (ref 82–98)
NITRITE UR QL STRIP: NEGATIVE
PH UR STRIP.AUTO: 6 [PH]
PLATELET # BLD AUTO: 283 THOUSANDS/UL (ref 149–390)
PMV BLD AUTO: 10.7 FL (ref 8.9–12.7)
POTASSIUM SERPL-SCNC: 4.2 MMOL/L (ref 3.5–5.3)
PROT SERPL-MCNC: 7.3 G/DL (ref 6.4–8.2)
PROT UR STRIP-MCNC: NEGATIVE MG/DL
RBC # BLD AUTO: 4.7 MILLION/UL (ref 3.81–5.12)
SODIUM SERPL-SCNC: 140 MMOL/L (ref 136–145)
SP GR UR STRIP.AUTO: 1.02 (ref 1–1.03)
T4 SERPL-MCNC: 10 UG/DL (ref 4.7–13.3)
TRIGL SERPL-MCNC: 102 MG/DL
TSH SERPL DL<=0.05 MIU/L-ACNC: 3.27 UIU/ML (ref 0.45–4.5)
UROBILINOGEN UR STRIP-ACNC: <2 MG/DL
WBC # BLD AUTO: 5.65 THOUSAND/UL (ref 4.31–10.16)

## 2022-04-19 PROCEDURE — 36415 COLL VENOUS BLD VENIPUNCTURE: CPT

## 2022-04-19 PROCEDURE — 80053 COMPREHEN METABOLIC PANEL: CPT

## 2022-04-19 PROCEDURE — 81003 URINALYSIS AUTO W/O SCOPE: CPT

## 2022-04-19 PROCEDURE — 80061 LIPID PANEL: CPT

## 2022-04-19 PROCEDURE — 85027 COMPLETE CBC AUTOMATED: CPT

## 2022-04-19 PROCEDURE — 84443 ASSAY THYROID STIM HORMONE: CPT

## 2022-04-19 PROCEDURE — 84436 ASSAY OF TOTAL THYROXINE: CPT

## 2022-04-25 ENCOUNTER — OFFICE VISIT (OUTPATIENT)
Dept: FAMILY MEDICINE CLINIC | Facility: CLINIC | Age: 67
End: 2022-04-25
Payer: MEDICARE

## 2022-04-25 VITALS
HEART RATE: 80 BPM | HEIGHT: 70 IN | WEIGHT: 172 LBS | SYSTOLIC BLOOD PRESSURE: 130 MMHG | BODY MASS INDEX: 24.62 KG/M2 | DIASTOLIC BLOOD PRESSURE: 70 MMHG

## 2022-04-25 DIAGNOSIS — E03.9 ACQUIRED HYPOTHYROIDISM: Primary | ICD-10-CM

## 2022-04-25 DIAGNOSIS — E78.2 MIXED HYPERLIPIDEMIA: ICD-10-CM

## 2022-04-25 DIAGNOSIS — M15.9 PRIMARY OSTEOARTHRITIS INVOLVING MULTIPLE JOINTS: ICD-10-CM

## 2022-04-25 DIAGNOSIS — L71.9 ROSACEA: ICD-10-CM

## 2022-04-25 DIAGNOSIS — Z11.59 NEED FOR HEPATITIS C SCREENING TEST: ICD-10-CM

## 2022-04-25 DIAGNOSIS — J44.9 CHRONIC OBSTRUCTIVE PULMONARY DISEASE, UNSPECIFIED COPD TYPE (HCC): ICD-10-CM

## 2022-04-25 DIAGNOSIS — J30.9 ALLERGIC RHINITIS, UNSPECIFIED SEASONALITY, UNSPECIFIED TRIGGER: ICD-10-CM

## 2022-04-25 DIAGNOSIS — Z80.3 FAMILY HISTORY OF BREAST CANCER IN MOTHER: ICD-10-CM

## 2022-04-25 DIAGNOSIS — Z00.00 HEALTHCARE MAINTENANCE: ICD-10-CM

## 2022-04-25 DIAGNOSIS — Z23 NEED FOR PNEUMOCOCCAL VACCINE: ICD-10-CM

## 2022-04-25 DIAGNOSIS — H69.80 DYSFUNCTION OF EUSTACHIAN TUBE, UNSPECIFIED LATERALITY: ICD-10-CM

## 2022-04-25 PROBLEM — Z12.11 SCREENING FOR COLON CANCER: Status: ACTIVE | Noted: 2022-04-25

## 2022-04-25 PROCEDURE — 99214 OFFICE O/P EST MOD 30 MIN: CPT | Performed by: FAMILY MEDICINE

## 2022-04-25 PROCEDURE — 90677 PCV20 VACCINE IM: CPT | Performed by: FAMILY MEDICINE

## 2022-04-25 NOTE — ASSESSMENT & PLAN NOTE
Prevnar 20 given office today    Recommending she go to pharmacy for Adacel and shingles vaccinations as they are covered there

## 2022-04-25 NOTE — ASSESSMENT & PLAN NOTE
Discussed again at risk for breast cancer secondary mother  Patient did not complete mammography    Mammography order was reported for patient

## 2022-04-25 NOTE — PATIENT INSTRUCTIONS
I recommend getting Adacel, tetanus and pertussis and shingles vaccinations at her local pharmacy because that is where Medicare pays for it  Complete mammography    Patient is at increased risk of breast cancer secondary to mother having breast cancer  Patient return in 7 months for office visit, blood work, and a [address]

## 2022-04-25 NOTE — PROGRESS NOTES
Assessment and Plan:  1  Hypothyroidism, stable continue present therapy  2  COPD, stable continue present therapy follow-up pulmonology  3  DJD, stable on Celebrex  4  Family history of breast cancer mother, patient verbalized understanding this puts her at increased risk of breast cancer  Mammography order was reprinted for patient  5  Hyperlipidemia, stable continue present therapy  6  Rosacea, stable continue present therapy  7  Per allergic rhinitis/eustachian tube dysfunction, stable continue present therapy  8  Healthcare maintenance, Prevnar 20 given today  Recommend patient going to her pharmacy for Adacel and shingles vaccinations  Hepatitis-C screening discussed order placed  9   Patient return in 6 months for office visit, blood work, and a [place]         Problem List Items Addressed This Visit        Endocrine    Acquired hypothyroidism - Primary     Stable continue levothyroxine 50 mcg daily         Relevant Orders    CBC    Comprehensive metabolic panel    Lipid Panel with Direct LDL reflex    TSH, 3rd generation with Free T4 reflex    UA (URINE) with reflex to Scope       Respiratory    Allergic rhinitis     Stable continue Flonase and Atrovent nasal spray         Relevant Orders    CBC    Comprehensive metabolic panel    Lipid Panel with Direct LDL reflex    TSH, 3rd generation with Free T4 reflex    UA (URINE) with reflex to Scope    Chronic obstructive pulmonary disease (HCC)     Stable follows with pulmonology         Relevant Orders    CBC    Comprehensive metabolic panel    Lipid Panel with Direct LDL reflex    TSH, 3rd generation with Free T4 reflex    UA (URINE) with reflex to Scope       Nervous and Auditory    Eustachian tube dysfunction     As above            Musculoskeletal and Integument    Osteoarthritis     Stable on Celebrex         Relevant Orders    CBC    Comprehensive metabolic panel    Lipid Panel with Direct LDL reflex    TSH, 3rd generation with Free T4 reflex    UA (URINE) with reflex to Scope    Rosacea     Stable on metronidazole         Relevant Orders    CBC    Comprehensive metabolic panel    Lipid Panel with Direct LDL reflex    TSH, 3rd generation with Free T4 reflex    UA (URINE) with reflex to Scope       Other    Mixed hyperlipidemia     Stable continue Crestor 5 mg         Relevant Orders    CBC    Comprehensive metabolic panel    Lipid Panel with Direct LDL reflex    TSH, 3rd generation with Free T4 reflex    UA (URINE) with reflex to Scope    Healthcare maintenance     Prevnar 20 given office today  Recommending she go to pharmacy for Adacel and shingles vaccinations as they are covered there         Relevant Orders    CBC    Comprehensive metabolic panel    Lipid Panel with Direct LDL reflex    TSH, 3rd generation with Free T4 reflex    UA (URINE) with reflex to Scope    Family history of breast cancer in mother     Discussed again at risk for breast cancer secondary mother  Patient did not complete mammography  Mammography order was reported for patient         Relevant Orders    CBC    Comprehensive metabolic panel    Lipid Panel with Direct LDL reflex    TSH, 3rd generation with Free T4 reflex    UA (URINE) with reflex to Scope      Other Visit Diagnoses     Need for hepatitis C screening test        Relevant Orders    CBC    Comprehensive metabolic panel    Lipid Panel with Direct LDL reflex    TSH, 3rd generation with Free T4 reflex    UA (URINE) with reflex to Scope    Hepatitis C Antibody ([facility])                 Diagnoses and all orders for this visit:    Acquired hypothyroidism  -     CBC; Future  -     Comprehensive metabolic panel; Future  -     Lipid Panel with Direct LDL reflex; Future  -     TSH, 3rd generation with Free T4 reflex; Future  -     UA (URINE) with reflex to Scope; Future    Chronic obstructive pulmonary disease, unspecified COPD type ([facility] [address] )  -     CBC; Future  -     Comprehensive metabolic panel;  Future  -     Lipid Panel with Direct LDL reflex; Future  -     TSH, 3rd generation with Free T4 reflex; Future  -     UA (URINE) with reflex to Scope; Future    Primary osteoarthritis involving multiple joints  -     CBC; Future  -     Comprehensive metabolic panel; Future  -     Lipid Panel with Direct LDL reflex; Future  -     TSH, 3rd generation with Free T4 reflex; Future  -     UA (URINE) with reflex to Scope; Future    Family history of breast cancer in mother  -     CBC; Future  -     Comprehensive metabolic panel; Future  -     Lipid Panel with Direct LDL reflex; Future  -     TSH, 3rd generation with Free T4 reflex; Future  -     UA (URINE) with reflex to Scope; Future    Mixed hyperlipidemia  -     CBC; Future  -     Comprehensive metabolic panel; Future  -     Lipid Panel with Direct LDL reflex; Future  -     TSH, 3rd generation with Free T4 reflex; Future  -     UA (URINE) with reflex to Scope; Future    Healthcare maintenance  -     CBC; Future  -     Comprehensive metabolic panel; Future  -     Lipid Panel with Direct LDL reflex; Future  -     TSH, 3rd generation with Free T4 reflex; Future  -     UA (URINE) with reflex to Scope; Future    Allergic rhinitis, unspecified seasonality, unspecified trigger  -     CBC; Future  -     Comprehensive metabolic panel; Future  -     Lipid Panel with Direct LDL reflex; Future  -     TSH, 3rd generation with Free T4 reflex; Future  -     UA (URINE) with reflex to Scope; Future    Rosacea  -     CBC; Future  -     Comprehensive metabolic panel; Future  -     Lipid Panel with Direct LDL reflex; Future  -     TSH, 3rd generation with Free T4 reflex; Future  -     UA (URINE) with reflex to Scope; Future    Need for hepatitis C screening test  -     CBC; Future  -     Comprehensive metabolic panel; Future  -     Lipid Panel with Direct LDL reflex; Future  -     TSH, 3rd generation with Free T4 reflex; Future  -     UA (URINE) with reflex to Scope;  Future  -     Hepatitis C Antibody ([facility]); Future    Dysfunction of Eustachian tube, unspecified laterality              Subjective:      Patient ID: Noreen Monzon is a 74 y o  female  CC:    Chief Complaint   Patient presents with    Follow-up     f/u to chronic conditions and review lab results  Orders for Mammo and Dexa reprinted for patient to schedule  mjs       HPI:    Patient doing well without any medical complaints concerns the present time  Blood work was discussed with the patient  The following portions of the patient's history were reviewed and updated as appropriate: allergies, current medications, past family history, past medical history, past social history, past surgical history and problem list       Review of Systems   Constitutional: Negative  HENT: Negative  Eyes: Negative  Respiratory: Negative  Cardiovascular: Negative  Gastrointestinal: Negative  Endocrine: Negative  Genitourinary: Negative  Musculoskeletal: Negative  Skin: Negative  Allergic/Immunologic: Negative  Neurological: Negative  Hematological: Negative  Psychiatric/Behavioral: Negative  Data to review:       Objective:    Vitals:    04/25/22 1636   BP: 130/70   Pulse: 80   Weight: 78 kg (172 lb)   Height: 5' 10" (1 778 m)        Physical Exam  Vitals and nursing note reviewed  Constitutional:       Appearance: Normal appearance  HENT:      Head: Normocephalic and atraumatic  Right Ear: Tympanic membrane normal       Left Ear: Tympanic membrane normal       Nose: Nose normal       Mouth/Throat:      Mouth: Mucous membranes are moist       Pharynx: Oropharynx is clear  No oropharyngeal exudate or posterior oropharyngeal erythema  Eyes:      General: No scleral icterus  Neck:      Vascular: No carotid bruit  Cardiovascular:      Rate and Rhythm: Normal rate and regular rhythm  Heart sounds: Normal heart sounds     Pulmonary:      Effort: Pulmonary effort is normal       Breath sounds: Normal breath sounds  Musculoskeletal:      Cervical back: Neck supple  Right lower leg: No edema  Left lower leg: No edema  Skin:     General: Skin is warm and dry  Neurological:      General: No focal deficit present  Mental Status: She is alert  Psychiatric:         Mood and Affect: Mood normal              Depression Screening and Follow-up Plan: Patient was screened for depression during today's encounter  They screened negative with a PHQ-2 score of 0

## 2022-05-11 DIAGNOSIS — J44.9 CHRONIC OBSTRUCTIVE PULMONARY DISEASE, UNSPECIFIED COPD TYPE (HCC): ICD-10-CM

## 2022-05-16 RX ORDER — BUDESONIDE AND FORMOTEROL FUMARATE DIHYDRATE 160; 4.5 UG/1; UG/1
AEROSOL RESPIRATORY (INHALATION)
Qty: 10.2 G | Refills: 3 | Status: SHIPPED | OUTPATIENT
Start: 2022-05-16

## 2022-05-23 ENCOUNTER — TELEPHONE (OUTPATIENT)
Dept: FAMILY MEDICINE CLINIC | Facility: CLINIC | Age: 67
End: 2022-05-23

## 2022-05-23 NOTE — TELEPHONE ENCOUNTER
Patient to contact the travel clinic at [facility] for advice on immunizations for monkeypox

## 2022-05-23 NOTE — TELEPHONE ENCOUNTER
Patient called in and stated that she is interrested in traveling at the end of summer early fall, patient would like a vaccine for moneypox and wanted to know what would dr Holly Grant recommendation be   Please advise, thank you

## 2022-05-27 ENCOUNTER — TELEPHONE (OUTPATIENT)
Dept: PULMONOLOGY | Facility: CLINIC | Age: 67
End: 2022-05-27

## 2022-05-27 NOTE — TELEPHONE ENCOUNTER
Lvm for pt to schedule a 6m f/u @ our [address] office with Dr Estelle Anguiano or AP in July or first available

## 2022-06-02 NOTE — TELEPHONE ENCOUNTER
Patient called stating that she never got a call back from the [facility] regarding the monkeypox vaccine  She is frustrated as she has been a long time patient of Dr Juan Luis Copeland and [facility]  She also asked for the [facility]'s phone number again, which I provided to her  Thank you

## 2022-06-23 ENCOUNTER — IMMUNIZATIONS (OUTPATIENT)
Dept: FAMILY MEDICINE CLINIC | Facility: CLINIC | Age: 67
End: 2022-06-23
Payer: MEDICARE

## 2022-06-23 DIAGNOSIS — Z23 COVID-19 VACCINE ADMINISTERED: Primary | ICD-10-CM

## 2022-06-23 PROCEDURE — 0054A PR IMM ADMN SARSCOV2 30MCG/0.3ML TRIS-SUCROSE BST: CPT

## 2022-06-23 PROCEDURE — 91305 PR SARSCOV2 VACCINE 30MCG/0.3ML TRIS-SUCROSE IM USE: CPT

## 2022-08-03 DIAGNOSIS — J44.9 COPD (CHRONIC OBSTRUCTIVE PULMONARY DISEASE) (HCC): ICD-10-CM

## 2022-08-11 DIAGNOSIS — E78.5 HYPERLIPIDEMIA, UNSPECIFIED HYPERLIPIDEMIA TYPE: ICD-10-CM

## 2022-08-11 RX ORDER — ROSUVASTATIN CALCIUM 5 MG/1
TABLET, COATED ORAL
Qty: 90 TABLET | Refills: 1 | Status: SHIPPED | OUTPATIENT
Start: 2022-08-11

## 2022-08-11 RX ORDER — TIOTROPIUM BROMIDE 18 UG/1
CAPSULE ORAL; RESPIRATORY (INHALATION)
Qty: 90 CAPSULE | Refills: 3 | Status: SHIPPED | OUTPATIENT
Start: 2022-08-11

## 2022-08-16 ENCOUNTER — OFFICE VISIT (OUTPATIENT)
Dept: PULMONOLOGY | Facility: CLINIC | Age: 67
End: 2022-08-16
Payer: MEDICARE

## 2022-08-16 VITALS — BODY MASS INDEX: 24.62 KG/M2 | WEIGHT: 172 LBS | HEIGHT: 70 IN

## 2022-08-16 DIAGNOSIS — J44.9 CHRONIC OBSTRUCTIVE PULMONARY DISEASE, UNSPECIFIED COPD TYPE (HCC): ICD-10-CM

## 2022-08-16 DIAGNOSIS — J06.9 VIRAL UPPER RESPIRATORY TRACT INFECTION: Primary | ICD-10-CM

## 2022-08-16 DIAGNOSIS — J30.9 ALLERGIC RHINITIS, UNSPECIFIED SEASONALITY, UNSPECIFIED TRIGGER: ICD-10-CM

## 2022-08-16 PROCEDURE — 99213 OFFICE O/P EST LOW 20 MIN: CPT | Performed by: INTERNAL MEDICINE

## 2022-08-16 NOTE — PROGRESS NOTES
Virtual Regular Visit    Verification of patient location:    Patient is located in the following state in which I hold an active license [place]      Assessment/Plan:  · Upper respiratory infection  Most likely viral   No antibiotics are indicated  · COPD without acute exacerbation  Continue Symbicort and Spiriva  Continue albuterol as needed  · Allergic rhinitis  Continue close fluticasone nasal spray 2 sprays to each nostril once a day  · Follow-up in 6 months  Problem List Items Addressed This Visit    None              Reason for visit is   Chief Complaint   Patient presents with    Follow-up    Virtual Regular Visit        Encounter provider Celia Fritz MD    Provider located at [place] [address]  [address] [facility] [address] [phone]      Recent Visits  No visits were found meeting these conditions  Showing recent visits within past 7 days and meeting all other requirements  Today's Visits  Date Type Provider Dept   08/16/22 Office Visit Celia Fritz MD [facility]   Showing today's visits and meeting all other requirements  Future Appointments  No visits were found meeting these conditions  Showing future appointments within next 150 days and meeting all other requirements       The patient was identified by name and date of birth  [facility] was informed that this is a telemedicine visit and that the visit is being conducted through [address] [place] [address] Now and patient was informed that this is a secure, HIPAA-compliant platform  She agrees to proceed     My office door was closed  No one else was in the room  She acknowledged consent and understanding of privacy and security of the video platform  The patient has agreed to participate and understands they can discontinue the visit at any time  Patient is aware this is a billable service       Subjective  [facility] 69 [facility]  [facility] who was seen today in a virtual visit because of upper respiratory infection  The patient stated that she was feeling nasal congestion and sinus congestion along with headache  Also had cough  No fever or chills  She ran out of her Spiriva and it took a while to get refilled        HPI     Past Medical History:   Diagnosis Date    Cataract     COPD [facility]chronic obstructive pulmonary disease) ([facility] [address] )     History of total hysterectomy     Hyperlipidemia     Hypothyroid     Osteoarthritis        Past Surgical History:   Procedure Laterality Date    BLEPHAROPLASTY Bilateral     upper lid - Dr Cheyenne Pettit, BILATERAL      COLONOSCOPY     [address] [facility]  2006    S/P dislocated shoulder       Current Outpatient Medications   Medication Sig Dispense Refill    Ascorbic Acid (vitamin C) 1000 MG tablet Take 1,000 mg by mouth daily      celecoxib (CeleBREX) 200 mg capsule TAKE 1 CAPSULE DAILY WITH FOOD AS NEEDED FOR ARTHRITIS PAIN 30 capsule 5    fluticasone (FLONASE) 50 mcg/act nasal spray 2 sprays into each nostril daily      ipratropium (ATROVENT) 0 03 % nasal spray USE 2 SPRAYS BOTH NOSTRILS 3 TIMES A DAY AS NEEDED 30 mL 5    levothyroxine 50 mcg tablet TAKE 1 TABLET (50 MCG TOTAL) BY MOUTH DAILY IN THE EARLY MORNING 90 tablet 3    loratadine-pseudoephedrine (CLARITIN-D 24-HOUR)  mg per 24 hr tablet Take 1 tablet by mouth daily      metroNIDAZOLE (METROGEL) 0 75 % gel Apply to face twice daily for rosacea (Patient taking differently: Apply to face twice daily for rosacea prn) 45 g 1    montelukast (SINGULAIR) 10 mg tablet TAKE 1 TABLET BY MOUTH EVERY DAY 90 tablet 3    Multiple Vitamins-Minerals (multivitamin with minerals) tablet Take 1 tablet by mouth daily      Omega-3 Fatty Acids (FISH OIL) 1,000 mg Take 3,000 mg by mouth daily      RESTASIS 0 05 % ophthalmic emulsion       rosuvastatin (CRESTOR) 5 mg tablet TAKE 1 TABLET BY MOUTH EVERY DAY 90 tablet 1    Spiriva HandiHaler 18 MCG inhalation capsule PLACE 1 CAPSULE INTO INHALER AND INHALE DAILY VIA HANDIHALER AT THE SAME TIME EVERY DAY 90 capsule 3    Symbicort 160-4 5 MCG/ACT inhaler INHALE 2 PUFFS BY MOUTH 2 TIMES A DAY RINSE MOUTH AFTER USE  10 2 g 3     No current facility-administered medications for this visit  Allergies   Allergen Reactions    Alcohol - Food Allergy     Other      narcotics      Mobic [Meloxicam] GI Intolerance       Review of Systems: Aside from what is stated above the rest of the review of systems is negative  Video Exam  The patient looked comfortable  Well-nourished  In no respiratory distress  She has conjugate gaze    Vitals:    08/16/22 1128   Weight: 78 kg (172 lb)   Height: 5' 10" (1 778 m)

## 2022-09-03 NOTE — TELEPHONE ENCOUNTER
Please notify the patient that I sent prednisone 10 mg tablets to [facility] on [address]  She is to take 3 a day with meals for 10 days 
Please review and advise
Pt called stating she isnt feeling any better and would prednisone called in, states that was discussed during visit with BT and visit with TS in the past if she wasn't feeling better
Pt notified
36.8

## 2022-10-11 PROBLEM — Z12.11 SCREENING FOR COLON CANCER: Status: RESOLVED | Noted: 2022-04-25 | Resolved: 2022-10-11

## 2022-11-06 DIAGNOSIS — J30.9 ALLERGIC RHINITIS: ICD-10-CM

## 2022-11-07 RX ORDER — MONTELUKAST SODIUM 10 MG/1
TABLET ORAL
Qty: 90 TABLET | Refills: 3 | Status: SHIPPED | OUTPATIENT
Start: 2022-11-07

## 2022-11-27 DIAGNOSIS — J44.9 CHRONIC OBSTRUCTIVE PULMONARY DISEASE, UNSPECIFIED COPD TYPE (HCC): ICD-10-CM

## 2022-11-28 RX ORDER — BUDESONIDE AND FORMOTEROL FUMARATE DIHYDRATE 160; 4.5 UG/1; UG/1
AEROSOL RESPIRATORY (INHALATION)
Qty: 10.2 G | Refills: 3 | Status: SHIPPED | OUTPATIENT
Start: 2022-11-28

## 2022-11-30 ENCOUNTER — APPOINTMENT (OUTPATIENT)
Dept: LAB | Facility: CLINIC | Age: 67
End: 2022-11-30

## 2022-11-30 DIAGNOSIS — E03.9 ACQUIRED HYPOTHYROIDISM: ICD-10-CM

## 2022-11-30 DIAGNOSIS — M15.9 PRIMARY OSTEOARTHRITIS INVOLVING MULTIPLE JOINTS: ICD-10-CM

## 2022-11-30 DIAGNOSIS — J44.9 CHRONIC OBSTRUCTIVE PULMONARY DISEASE, UNSPECIFIED COPD TYPE (HCC): ICD-10-CM

## 2022-11-30 DIAGNOSIS — Z80.3 FAMILY HISTORY OF BREAST CANCER IN MOTHER: ICD-10-CM

## 2022-11-30 DIAGNOSIS — L71.9 ROSACEA: ICD-10-CM

## 2022-11-30 DIAGNOSIS — Z00.00 HEALTHCARE MAINTENANCE: ICD-10-CM

## 2022-11-30 DIAGNOSIS — Z11.59 NEED FOR HEPATITIS C SCREENING TEST: ICD-10-CM

## 2022-11-30 DIAGNOSIS — J30.9 ALLERGIC RHINITIS, UNSPECIFIED SEASONALITY, UNSPECIFIED TRIGGER: ICD-10-CM

## 2022-11-30 DIAGNOSIS — E78.2 MIXED HYPERLIPIDEMIA: ICD-10-CM

## 2022-11-30 LAB
BILIRUB UR QL STRIP: NEGATIVE
CLARITY UR: CLEAR
COLOR UR: YELLOW
ERYTHROCYTE [DISTWIDTH] IN BLOOD BY AUTOMATED COUNT: 13.4 % (ref 11.6–15.1)
GLUCOSE UR STRIP-MCNC: NEGATIVE MG/DL
HCT VFR BLD AUTO: 43.8 % (ref 34.8–46.1)
HCV AB SER QL: NORMAL
HGB BLD-MCNC: 14 G/DL (ref 11.5–15.4)
HGB UR QL STRIP.AUTO: NEGATIVE
KETONES UR STRIP-MCNC: NEGATIVE MG/DL
LEUKOCYTE ESTERASE UR QL STRIP: NEGATIVE
MCH RBC QN AUTO: 29.6 PG (ref 26.8–34.3)
MCHC RBC AUTO-ENTMCNC: 32 G/DL (ref 31.4–37.4)
MCV RBC AUTO: 93 FL (ref 82–98)
NITRITE UR QL STRIP: NEGATIVE
PH UR STRIP.AUTO: 6 [PH]
PLATELET # BLD AUTO: 269 THOUSANDS/UL (ref 149–390)
PMV BLD AUTO: 10.2 FL (ref 8.9–12.7)
PROT UR STRIP-MCNC: NEGATIVE MG/DL
RBC # BLD AUTO: 4.73 MILLION/UL (ref 3.81–5.12)
SP GR UR STRIP.AUTO: 1.02 (ref 1–1.03)
UROBILINOGEN UR STRIP-ACNC: <2 MG/DL
WBC # BLD AUTO: 6.19 THOUSAND/UL (ref 4.31–10.16)

## 2022-12-01 ENCOUNTER — OFFICE VISIT (OUTPATIENT)
Dept: FAMILY MEDICINE CLINIC | Facility: CLINIC | Age: 67
End: 2022-12-01

## 2022-12-01 ENCOUNTER — TELEPHONE (OUTPATIENT)
Dept: OBGYN CLINIC | Facility: OTHER | Age: 67
End: 2022-12-01

## 2022-12-01 VITALS
HEART RATE: 76 BPM | WEIGHT: 174 LBS | DIASTOLIC BLOOD PRESSURE: 72 MMHG | SYSTOLIC BLOOD PRESSURE: 124 MMHG | TEMPERATURE: 96.7 F | BODY MASS INDEX: 24.97 KG/M2 | OXYGEN SATURATION: 97 %

## 2022-12-01 DIAGNOSIS — L71.9 ROSACEA: ICD-10-CM

## 2022-12-01 DIAGNOSIS — Z23 NEED FOR INFLUENZA VACCINATION: ICD-10-CM

## 2022-12-01 DIAGNOSIS — M15.9 PRIMARY OSTEOARTHRITIS INVOLVING MULTIPLE JOINTS: ICD-10-CM

## 2022-12-01 DIAGNOSIS — Z78.0 POSTMENOPAUSAL ESTROGEN DEFICIENCY: ICD-10-CM

## 2022-12-01 DIAGNOSIS — Z80.3 FAMILY HISTORY OF BREAST CANCER IN MOTHER: ICD-10-CM

## 2022-12-01 DIAGNOSIS — Z00.00 HEALTHCARE MAINTENANCE: ICD-10-CM

## 2022-12-01 DIAGNOSIS — R22.31 MASS OF FINGER OF RIGHT HAND: ICD-10-CM

## 2022-12-01 DIAGNOSIS — E03.9 ACQUIRED HYPOTHYROIDISM: Primary | ICD-10-CM

## 2022-12-01 DIAGNOSIS — J44.9 CHRONIC OBSTRUCTIVE PULMONARY DISEASE, UNSPECIFIED COPD TYPE (HCC): ICD-10-CM

## 2022-12-01 DIAGNOSIS — Z12.31 ENCOUNTER FOR SCREENING MAMMOGRAM FOR MALIGNANT NEOPLASM OF BREAST: ICD-10-CM

## 2022-12-01 DIAGNOSIS — E78.2 MIXED HYPERLIPIDEMIA: ICD-10-CM

## 2022-12-01 LAB
ALBUMIN SERPL BCP-MCNC: 3.4 G/DL (ref 3.5–5)
ALP SERPL-CCNC: 50 U/L (ref 46–116)
ALT SERPL W P-5'-P-CCNC: 30 U/L (ref 12–78)
ANION GAP SERPL CALCULATED.3IONS-SCNC: 7 MMOL/L (ref 4–13)
AST SERPL W P-5'-P-CCNC: 17 U/L (ref 5–45)
BILIRUB SERPL-MCNC: 0.32 MG/DL (ref 0.2–1)
BUN SERPL-MCNC: 17 MG/DL (ref 5–25)
CALCIUM ALBUM COR SERPL-MCNC: 9.9 MG/DL (ref 8.3–10.1)
CALCIUM SERPL-MCNC: 9.4 MG/DL (ref 8.3–10.1)
CHLORIDE SERPL-SCNC: 111 MMOL/L (ref 96–108)
CHOLEST SERPL-MCNC: 197 MG/DL
CO2 SERPL-SCNC: 24 MMOL/L (ref 21–32)
CREAT SERPL-MCNC: 0.72 MG/DL (ref 0.6–1.3)
GFR SERPL CREATININE-BSD FRML MDRD: 85 ML/MIN/1.73SQ M
GLUCOSE P FAST SERPL-MCNC: 99 MG/DL (ref 65–99)
HDLC SERPL-MCNC: 87 MG/DL
LDLC SERPL CALC-MCNC: 92 MG/DL (ref 0–100)
POTASSIUM SERPL-SCNC: 4.4 MMOL/L (ref 3.5–5.3)
PROT SERPL-MCNC: 7.5 G/DL (ref 6.4–8.4)
SODIUM SERPL-SCNC: 142 MMOL/L (ref 135–147)
TRIGL SERPL-MCNC: 91 MG/DL
TSH SERPL DL<=0.05 MIU/L-ACNC: 4.07 UIU/ML (ref 0.45–4.5)

## 2022-12-01 NOTE — TELEPHONE ENCOUNTER
Patient is being referred to a orthopedics  Please schedule accordingly      [address]   [phone]

## 2022-12-01 NOTE — ASSESSMENT & PLAN NOTE
Medicare a [address] completed, influenza vaccine given
Stable continue Celebrex
Stable continue Crestor 5 mg daily
Stable continue levothyroxine 50 mcg daily
Stable using metronidazole gel as needed
Stable, patient is on Symbicort to follow-up Pulmonary Medicine
room air

## 2022-12-01 NOTE — PATIENT INSTRUCTIONS
I recommend patient complete mammography    She has a family history mother breast cancer in she is at increased risk for breast cancer   Patient follow hand specialist, Dr Darrell Culp, for evaluation of mass of right hand   I recommend completing DEXA scan for evaluation of osteoporosis   Return in 6 months for office visit blood work sooner if needed

## 2022-12-01 NOTE — PROGRESS NOTES
Assessment and Plan:     1  Hypothyroidism, stable continue present therapy  2  DJD, stable continue present therapy  3  COPD, stable continue present therapy follow-up Pulmonary Medicine  4  Rosacea, stable on metronidazole gel as needed   5  Family history mother breast cancer, mammography is ordered  Patient verbalized understanding she is at increased risk for breast cancer  6  Health care maintenance Medicare a [address] completed, influenza vaccine given, COVID declined  7  Hyperlipidemia, stable continue present therapy  8  Mass of right and, finger  I favor ganglia on cyst   Refer to hand specialist, Dr Partha Early  9  Patient to return in 6 months for office visit blood work sooner if needed          Problem List Items Addressed This Visit        Endocrine    Acquired hypothyroidism - Primary     Stable continue levothyroxine 50 mcg daily         Relevant Orders    CBC    Comprehensive metabolic panel    Lipid Panel with Direct LDL reflex    T4    TSH, 3rd generation with Free T4 reflex    UA (URINE) with reflex to Scope       Respiratory    Chronic obstructive pulmonary disease (HCC)     Stable, patient is on Symbicort to follow-up Pulmonary Medicine         Relevant Orders    CBC    Comprehensive metabolic panel    Lipid Panel with Direct LDL reflex    T4    TSH, 3rd generation with Free T4 reflex    UA (URINE) with reflex to Scope       Musculoskeletal and Integument    Osteoarthritis     Stable continue Celebrex         Relevant Orders    CBC    Comprehensive metabolic panel    Lipid Panel with Direct LDL reflex    T4    TSH, 3rd generation with Free T4 reflex    UA (URINE) with reflex to Scope    Rosacea     Stable using metronidazole gel as needed         Relevant Orders    CBC    Comprehensive metabolic panel    Lipid Panel with Direct LDL reflex    T4    TSH, 3rd generation with Free T4 reflex    UA (URINE) with reflex to Scope       Other    Mixed hyperlipidemia     Stable continue Crestor 5 mg daily Relevant Orders    CBC    Comprehensive metabolic panel    Lipid Panel with Direct LDL reflex    T4    TSH, 3rd generation with Free T4 reflex    UA (URINE) with reflex to Scope    Healthcare maintenance     Medicare a [address] completed, influenza vaccine given         Relevant Orders    CBC    Comprehensive metabolic panel    Lipid Panel with Direct LDL reflex    T4    TSH, 3rd generation with Free T4 reflex    UA (URINE) with reflex to Scope    Family history of breast cancer in mother    Relevant Orders    CBC    Comprehensive metabolic panel    Lipid Panel with Direct LDL reflex    T4    TSH, 3rd generation with Free T4 reflex    UA (URINE) with reflex to Scope   Other Visit Diagnoses     Encounter for screening mammogram for malignant neoplasm of breast        Relevant Orders    Mammo screening bilateral w 3d & cad    Postmenopausal estrogen deficiency        Relevant Orders    DXA bone density spine hip and pelvis    Mass of finger of right hand        Relevant Orders    Ambulatory Referral to Hand Surgery    CBC    Comprehensive metabolic panel    Lipid Panel with Direct LDL reflex    T4    TSH, 3rd generation with Free T4 reflex    UA (URINE) with reflex to Scope           Preventive health issues were discussed with patient, and age appropriate screening tests were ordered as noted in patient's After Visit Summary  Personalized health advice and appropriate referrals for health education or preventive services given if needed, as noted in patient's After Visit Summary  History of Present Illness:     Patient presents for a Medicare Wellness Visit    Patient has enlarging mass right 3rd finger nonpainful  Blood work was discussed with the patient  Patient Care Team:  Milvia Isaac DO as PCP - General (Family Medicine)  Jhonny Trevino MD     Review of Systems:     Review of Systems   Constitutional: Negative  HENT: Negative  Eyes: Negative  Respiratory: Negative  Cardiovascular: Negative  Gastrointestinal: Negative  Endocrine: Negative  Genitourinary: Negative  Musculoskeletal:        HPI   Skin: Negative  Allergic/Immunologic: Negative  Neurological: Negative  Hematological: Negative  Psychiatric/Behavioral: Negative           Problem List:     Patient Active Problem List   Diagnosis   • Allergic rhinitis   • Bilateral cataracts   • Chronic obstructive pulmonary disease ([facility] )   • Mixed hyperlipidemia   • Acquired hypothyroidism   • Osteoarthritis   • Eustachian tube dysfunction   • Rosacea   • Healthcare maintenance   • Family history of breast cancer in mother      Past Medical and Surgical History:     Past Medical History:   Diagnosis Date   • Cataract    • COPD (chronic obstructive pulmonary disease) ([facility] )    • History of total hysterectomy    • Hyperlipidemia    • Hypothyroid    • Osteoarthritis      Past Surgical History:   Procedure Laterality Date   • BLEPHAROPLASTY Bilateral     upper lid - Dr Lawyer Guardado   • CATARACT EXTRACTION, BILATERAL     • COLONOSCOPY     • HYSTERECTOMY     • REVISION COLOSTOMY     • SHOULDER SURGERY      S/P dislocated shoulder      Family History:     Family History   Problem Relation Age of Onset   • Breast cancer Mother    • Hypertension Mother    • Alcohol abuse Father    • Drug abuse Sister       Social History:     Social History     Socioeconomic History   • Marital status: Single     Spouse name: None   • Number of children: None   • Years of education: None   • Highest education level: None   Occupational History   • 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   Tobacco Use   • Smoking status: Former     Packs/day: 3 00     Years: 14 00     Pack years: 42 00     Types: Cigarettes     Start date:      Quit date:      Years since quittin 9   • Smokeless tobacco: Never   Vaping Use   • Vaping Use: Never used   Substance and Sexual Activity   • Alcohol use: Not Currently     Comment: per [employer]-stopped drinking alcohol   • Drug use: No     Comment: per [employer]-recovering from drug addiction   • Sexual activity: Not Currently   Other Topics Concern   • None   Social History Narrative    Daily coffee consumption 4 cups per day     Social Determinants of Health     Financial Resource Strain: Low Risk    • Difficulty of Paying Living Expenses: Not very hard   Food Insecurity: Not on file   Transportation Needs: No Transportation Needs   • Lack of Transportation (Medical): No   • Lack of Transportation (Non-Medical):  No   Physical Activity: Not on file   Stress: Not on file   Social Connections: Not on file   Intimate Partner Violence: Not on file   Housing Stability: Not on file      Medications and Allergies:     Current Outpatient Medications   Medication Sig Dispense Refill   • Ascorbic Acid (vitamin C) 1000 MG tablet Take 1,000 mg by mouth daily     • celecoxib (CeleBREX) 200 mg capsule TAKE 1 CAPSULE DAILY WITH FOOD AS NEEDED FOR ARTHRITIS PAIN 30 capsule 5   • fluticasone (FLONASE) 50 mcg/act nasal spray 2 sprays into each nostril daily     • ipratropium (ATROVENT) 0 03 % nasal spray USE 2 SPRAYS BOTH NOSTRILS 3 TIMES A DAY AS NEEDED 30 mL 5   • levothyroxine 50 mcg tablet TAKE 1 TABLET (50 MCG TOTAL) BY MOUTH DAILY IN THE EARLY MORNING 90 tablet 3   • loratadine-pseudoephedrine (CLARITIN-D 24-HOUR)  mg per 24 hr tablet Take 1 tablet by mouth daily     • metroNIDAZOLE (METROGEL) 0 75 % gel Apply to face twice daily for rosacea (Patient taking differently: Apply to face twice daily for rosacea prn) 45 g 1   • montelukast (SINGULAIR) 10 mg tablet TAKE 1 TABLET BY MOUTH EVERY DAY 90 tablet 3   • Multiple Vitamins-Minerals (multivitamin with minerals) tablet Take 1 tablet by mouth daily     • Omega-3 Fatty Acids (FISH OIL) 1,000 mg Take 3,000 mg by mouth daily     • RESTASIS 0 05 % ophthalmic emulsion      • rosuvastatin (CRESTOR) 5 mg tablet TAKE 1 TABLET BY MOUTH EVERY DAY 90 tablet 1 • Spiriva HandiHaler 18 MCG inhalation capsule PLACE 1 CAPSULE INTO INHALER AND INHALE DAILY VIA HANDIHALER AT THE SAME TIME EVERY DAY 90 capsule 3   • Symbicort 160-4 5 MCG/ACT inhaler INHALE 2 PUFFS BY MOUTH 2 TIMES A DAY RINSE MOUTH AFTER USE  10 2 g 3     No current facility-administered medications for this visit  Allergies   Allergen Reactions   • Alcohol - Food Allergy    • Other      narcotics     • Mobic [Meloxicam] GI Intolerance      Immunizations:     Immunization History   Administered Date(s) Administered   • COVID-19 MODERNA VACC 0 5 ML IM 03/23/2021, 04/22/2021   • COVID-19 Pfizer vac (Dylan-sucrose, gray cap) 12 yr+ IM 06/23/2022   • INFLUENZA 09/21/2017   • Influenza Quadrivalent Preservative Free ID 09/21/2017   • Influenza, high dose seasonal 0 7 mL 09/16/2020   • Influenza, injectable, quadrivalent, preservative free 0 5 mL 01/29/2020   • Pneumococcal Conjugate Vaccine 20-valent (Pcv20), Polysace 04/25/2022   • Pneumococcal Polysaccharide PPV23 09/24/2019   • Tuberculin Skin Test-PPD Intradermal 04/17/2013      Health Maintenance:         Topic Date Due   • Breast Cancer Screening: Mammogram  Never done   • Colorectal Cancer Screening  01/31/2024   • Hepatitis C Screening  Completed         Topic Date Due   • Hepatitis B Vaccine (1 of 3 - 3-dose series) Never done   • Influenza Vaccine (1) 09/01/2022   • COVID-19 Vaccine (4 - Booster for Moderna series) 10/23/2022      Medicare Screening Tests and Risk Assessments:     Last Medicare Wellness visit information reviewed, patient interviewed, no change since last AWV  Health Risk Assessment:   Patient rates overall health as good  Patient feels that their physical health rating is same  Patient is satisfied with their life  Eyesight was rated as same  Hearing was rated as same  Patient feels that their emotional and mental health rating is same  Patients states they are never, rarely angry   Patient states they are never, rarely unusually tired/fatigued  Pain experienced in the last 7 days has been none  Patient states that she has experienced no weight loss or gain in last 6 months  Fall Risk Screening: In the past year, patient has experienced: no history of falling in past year      Urinary Incontinence Screening:   Patient has not leaked urine accidently in the last six months  Home Safety:  Patient does not have trouble with stairs inside or outside of their home  Patient has working smoke alarms and has working carbon monoxide detector  Home safety hazards include: none  Nutrition:   Current diet is Regular  Medications:   Patient is currently taking over-the-counter supplements  OTC medications include: see medication list  Patient is able to manage medications  Activities of Daily Living (ADLs)/Instrumental Activities of Daily Living (IADLs):   Walk and transfer into and out of bed and chair?: Yes  Dress and groom yourself?: Yes    Bathe or shower yourself?: Yes    Feed yourself? Yes  Do your laundry/housekeeping?: Yes  Manage your money, pay your bills and track your expenses?: Yes  Make your own meals?: Yes    Do your own shopping?: Yes    Previous Hospitalizations:   Any hospitalizations or ED visits within the last 12 months?: No      Advance Care Planning:   Living will: Yes    Durable POA for healthcare:  Yes    Advanced directive: Yes    Advanced directive counseling given: Yes    Five wishes given: No    Patient declined ACP directive: Yes    End of Life Decisions reviewed with patient: Yes    Provider agrees with end of life decisions: Yes      Cognitive Screening:   Provider or family/friend/caregiver concerned regarding cognition?: No    PREVENTIVE SCREENINGS      Cardiovascular Screening:    General: Screening Not Indicated and History Lipid Disorder      Diabetes Screening:     General: Screening Current      Colorectal Cancer Screening:     General: Screening Current      Breast Cancer Screening: General: Risks and Benefits Discussed    Due for: Mammogram        Cervical Cancer Screening:    General: Screening Not Indicated      Osteoporosis Screening:    General: Risks and Benefits Discussed    Due for: DXA Axial      Abdominal Aortic Aneurysm (AAA) Screening:        General: Screening Not Indicated      Lung Cancer Screening:     General: Screening Not Indicated      Hepatitis C Screening:    General: Screening Current    Screening, Brief Intervention, and Referral to Treatment (SBIRT)    Screening  Typical number of drinks in a day: 0  Typical number of drinks in a week: 0  Interpretation: Low risk drinking behavior  Single Item Drug Screening:  How often have you used an illegal drug (including marijuana) or a prescription medication for non-medical reasons in the past year? never    Single Item Drug Screen Score: 0  Interpretation: Negative screen for possible drug use disorder    No results found  Physical Exam:     /72   Pulse 76   Temp (!) 96 7 °F (35 9 °C) (Temporal)   Wt 78 9 kg (174 lb)   SpO2 97%   BMI 24 97 kg/m²     Physical Exam  Vitals and nursing note reviewed  Constitutional:       Appearance: Normal appearance  HENT:      Head: Normocephalic and atraumatic  Eyes:      General: No scleral icterus  Neck:      Vascular: No carotid bruit  Cardiovascular:      Rate and Rhythm: Normal rate and regular rhythm  Heart sounds: Normal heart sounds  Pulmonary:      Effort: Pulmonary effort is normal       Breath sounds: Normal breath sounds  Abdominal:      General: Bowel sounds are normal       Palpations: Abdomen is soft  Tenderness: There is no abdominal tenderness  Musculoskeletal:      Cervical back: Neck supple  Right lower leg: No edema  Left lower leg: No edema  Comments: Right 3rd finger lateral subcutaneous soft cystic mass approximately 0 8 x 0 5 cm negative pulsatile   Skin:     General: Skin is warm and dry     Neurological: General: No focal deficit present  Mental Status: She is alert     Psychiatric:         Mood and Affect: Mood normal           Sánchez Pryor DO

## 2022-12-12 DIAGNOSIS — E78.5 HYPERLIPIDEMIA, UNSPECIFIED HYPERLIPIDEMIA TYPE: ICD-10-CM

## 2022-12-12 RX ORDER — ROSUVASTATIN CALCIUM 5 MG/1
TABLET, COATED ORAL
Qty: 90 TABLET | Refills: 1 | Status: SHIPPED | OUTPATIENT
Start: 2022-12-12

## 2023-01-23 ENCOUNTER — OFFICE VISIT (OUTPATIENT)
Dept: OBGYN CLINIC | Facility: CLINIC | Age: 68
End: 2023-01-23

## 2023-01-23 VITALS
HEIGHT: 70 IN | BODY MASS INDEX: 25.05 KG/M2 | DIASTOLIC BLOOD PRESSURE: 79 MMHG | WEIGHT: 175 LBS | SYSTOLIC BLOOD PRESSURE: 147 MMHG | HEART RATE: 86 BPM

## 2023-01-23 DIAGNOSIS — R22.31 MASS OF FINGER OF RIGHT HAND: ICD-10-CM

## 2023-01-23 RX ORDER — LIDOCAINE HYDROCHLORIDE AND EPINEPHRINE 10; 10 MG/ML; UG/ML
20 INJECTION, SOLUTION INFILTRATION; PERINEURAL ONCE
OUTPATIENT
Start: 2023-01-23 | End: 2023-01-23

## 2023-01-23 NOTE — PATIENT INSTRUCTIONS
Tumors of the Hand & Wrist: Lumps and Bumps    What is a Tumor? Any abnormal lump or bump, or “mass”, is considered a tumor  The term “tumor” does not necessarily mean it is malignant or that it is a cancer  In fact, the vast majority of hand and wrist tumors are benign or non-cancerous  Any lump or bump in your hand or wrist is a tumor regardless of what causes it  Tumors can occur on the skin, such as a mole or a wart, or can occur underneath the skin in the soft tissue or even the bone  Because there are so many tissue types in the hand (e g  skin, fat, ligaments, tendons, nerves, blood vessels, bone, etc) there are many types of tumors that can occur  However, only a few of them are seen commonly  What types of Hand & Wrist Tumors are there? The most common tumor in the hand and wrist is a ganglion cyst  They are seen frequently in the wrist but can occur at the base of the fingers or around the finger joints  A ganglion cyst is the “ballooning-out” of the lining of a joint or a tendon sheath  The fluid which lubricates the joint or tendon has a thick, molasses-like consistency, filling the cyst making it feel very firm  The diagnosis and treatment options are discussed in more detail in another brochure and in a separate section on the [address] [place] [address] web-site  There are several treatment options for a presumed ganglion cyst, including observation (doing nothing), aspiration (puncturing with a needle), or surgically removing the cyst     The 2nd most common hand tumor is a giant cell tumor of tendon sheath  Unlike the fluid-filled ganglion cyst, these tumors are solid  They occur anywhere near a tendon sheath (outer  that supports the tendon) and are benign (non-cancerous) and are slow-growing (see Figure 1)  Some doctors believe that they may be caused by trauma to the tendon sheath that stimulates abnormal growth  Another common tumor is an epidermal inclusion cyst (see Figure 2)   It is also benign and forms just underneath the skin, originating from the undersurface of the skin where there may have been a cut or puncture  Skin cells normally produce a protective waxy substance called keratin  When skin cells get trapped under the surface, such as with a skin puncture, they continue to grow and make keratin, forming the cyst  The cyst is like a sac with a fibrous outer lining and is filled with a soft, cheese-like material, the keratin  There are other less common types of tumors seen in the hand  They include lipomas (fatty tumors), neuromas (nerve tumors), nerve sheath tumors, fibromas, and glomus tumors, among others (see Figure 3)  They are practically all benign  Bone spurs can form, from arthritis or trauma, which feel like hard tumors  Foreign bodies, such as a splinter, can also cause reactions that form lumps or bumps in the hand (see Figure 4)  What about Hand & Wrist Cancer? When patients discover a lump or bump in their hand or wrist, often one of their first concerns is whether or not it is a cancer  Fortunately, cancer in the hand is extremely rare  The most common cancers that originate in the hand are skin cancers, such as squamous cell carcinoma, basal cell carcinoma, or melanoma  Other cancers are very rare but include sarcomas of the soft tissue or bone  It is also possible for cancer to spread to the hand or wrist from somewhere else in the body, such as the lung or breast  This is called metastatic cancer  As with any cancer, a biopsy is usually required to make a definitive diagnosis  Evaluation and Treatment  A careful history and physical exam performed by a hand surgeon can help to determine the type of hand or wrist tumor  X-rays might be taken to evaluate the bones, joints, and possibly the soft tissue  Further studies such as CT, MRI, or bone scan may be done to help narrow down the diagnosis   Recommendations for treatment are based on the experience of the hand surgeon and preferences of the patient  Typically, definitive treatment with the lowest recurrence rate involves surgical excision of the tumor  Removing the tumor also allows a pathologist to analyze it and to determine what type it is with reasonable certainty  Needle biopsy or incisional biopsy (cutting out a small sample of the tumor) may be considered if the surgeon wants to confirm the diagnosis before recommending definitive treatment  Often, surgery is done under a local or regional anesthesia (numbing the extremity or area) and on an outpatient basis  Risks and benefits should be discussed with the surgeon  Most hand and wrist tumors can be cured with surgery  Some patients may choose to do nothing and simply live with the tumor once they learn that it is probably benign  However, if the tumor changes (eg  skin discoloration, pain, increased size) or if it causes problems with nearby structures (for example, numbness or pain from pressure on a nearby nerve) re-evaluation by a hand surgeon is recommended  Patients should consider the risks, benefits, and consequences of treatment, whether with surgery or without surgery  Hand surgeons can provide information and advice to allow patients to make the best decisions regarding their treatment plans  American [employer] for [employer] of the Hand  www handcare  [facility]  Leni Parsons MD  Chief of [address]  [phone]  You have chosen to undergo surgery for your condition  Any surgery is associated with risks of potential complications  Certain medical problems such as smoking, diabetes, thyroid disease, neuropathy, malnutrition or bleeding problems may increase your risk of complications    Complications after surgery are rare but include the following:  Bleeding Infection  Scar Pain  Tenderness Problems with healing  Damage to nerves Damage to blood vessels  Lack of desired results Need for further surgery  Numbness Stiffness  Problems with anesthesia Blood clots  Need for hardware removal (if inserted)    If bony work is done, other risks include:  Delayed bone healing  Lack of bone healing  Bones healing in wrong position    While these risks and complications are rare and infrequent they are still important to know and discuss  If you have any other questions, please let me know  _____________________________________________________________________________________  It can be difficult, but it is possible to get on with your life with only one hand for the first few weeks after your operation  The following are a few suggestions that may be helpful when you're recovering from your hand problem or from your hand surgery  While most of these suggestions are really only applicable if your dominant or your writing hand is affected, some apply to problems involving either hand  Most daily activities can be accomplished with some modifications, rather than the need for store bought devices  Before surgery, if you can:  Ask for help: Have others help you with: childcare, housework, and meals  Practice: Dressing, undressing, using the toilet, brushing your teeth, showering  Prepare: for the first few days after surgery  Open and re-sealed cans and bottles that you may need  Open medication containers and leave them easy-to-read open  Make sure you put these medication containers out of reach of children, even if you don't expect children to visit you  Prepare and think about “no-cut”meals-sandwiches, ground meats, etc     It helps to have…  In the shower  Plastic bags and rubber bands to cover bandages-the martins that newspapers come in are good  Also, small trashcan liners will work  Use 2 of these at a time  The other option is an oversized rubber glove that may be purchased at a food store to aid in dishwashing    A bottle sponge (soft sponge on a long stick)-for the armpit of your“good hand”  Shower brush  At [place] in the shower will help you to wash your hair  A cotton terrycloth bathrobe to aid you in drying your back    In the bathroom  Toothpaste, shampoo, etc  in a flip top or pump dispenser  Flossers (dental floss on a“Y”shaped handle)  Consider an electric razor    In the bedroom  Back scratcher  Large sleeve shirts and tops  Put away clothing which buttons, fastens or snaps in the back, or which uses drawstrings    In the kitchen  A rubber jar opener Steven-to help open jars, but also to keep things from sliding around while you are working on them  Double suction cup pads (“the little octopus”)-to hold items while you use or wash them  An electric can opener with a lid magnet strong enough to hold the can in the air-for one-handed use  Consider Cleatus Crome and wear” haircut  [facility] Scar Care  You should keep your bandages dry and clean; change your dressings if you see drainage coming through your dressings  Signs of infection include increased pain, swelling, redness, warmth, and excessive or foul-smelling drainage  Please contact our office if you experience signs of infection  You may wash with soap and water after given permission  Sutures will be removed between 7-14 days after surgery if the wound is healed  Patients who smoke, have diabetes, or have nutritional problems may need longer to heal   Steri-strips may be placed across your incision at this time, which will fall off or peel away  To help prevent infection, do not submerse your incision area for 2-3 weeks (i e  no hot tubs, pools, ocean, dish water, fish ponds, fish tanks, bathtubs)  Swelling, bruising, and numbness are common after surgery  To help reduce these symptoms, keep the area elevated  Scar Care:  To improve the appearance of your scar, you can massage the healed area (using circular motions with your fingertip) for 5 minutes 3x a day after your steri-strips peel away  You can use regular hand lotion or Scar zone from the store  You may also use Silicone pads after the stitches are removed (available at the store)  Redness and bumpiness of the scar are expected  These generally improve as healing progresses, but redness can be expected for up to 6-8 months  ** If you have any questions or concerns, please call our office  [phone]  _____________________________________________________________________________________  Pain Management  Pain after an injury or surgery is common and should often be expected  There are many ways to manage and reduce this pain  This often does not include medications or may not exclusively include medication  Each patient, surgery and surgeon are unique, and the approach to management of pain is individual   It is important to try to discuss your concerns and expectations regarding pain with your surgical team before and after surgery  They want to help you get better and have a good patient experience  Your patient experience includes understanding and treating your pain  Here's what you may expect before surgery and how to manage your pain and medications after surgery  Again, the approach to pain management after injury or surgery is individualized, and this is general information  Your surgical team will have more specific recommendations for you  Before using any of the methods explored here, please discuss with your medical team if these pain management methods are appropriate for you  We advise good communication with your team to let them help you achieve the best outcome  Surgery Day  As your surgery begins, your surgeon and anesthesia team may give you medications by mouth, IV, and/or injection  Giving you medication as the surgery progresses not only helps you to decrease pain during the surgery, but it also reduces your pain post-surgery   You may also receive medication in the recovery room after surgery, if needed  In some cases, you will receive prescription pain medication and instructions for its use to use at home in the days following your surgery  You may also receive instructions on using over-the-counter pain medications  It is important to follow these directions carefully, as many over-the counter medications contain some of the same ingredients as prescription pain medications and using them together can result in a dangerous accidental overdose  Post-Surgery Pain Management  While always important to follow your specific postoperative instructions, here are some different methods, outside of medication, that your team may recommend to reduce your pain:   Elevate: Elevating the injured area so it is higher than your heart can reduce swelling and pain  Swelling can increase quickly by putting your hand at your side, and this can make your dressing feel tight  Often, the pain associated with swelling is difficult to control, so it is best to avoid this problem  Take care of your dressing: If your dressing/splint feels tight, and elevation for 10 minutes does not improve the tight sensation, contact your surgical team  It may be recommended that you unravel any tape or elastic wrap and loosen the outer bandage  If this does not help, you may be advised to tear, unravel, or cut the inner layers with blunt tipped scissors  Make sure you are cutting on the opposite side of where your incision is located  When done, you will need to try to rebuild your dressing to keep your wound clean and covered  Before doing any of this, check with your medical team  They may want to be aware of the tight dressing and could have different instructions for loosening the dressing  Keep moving: If allowed by your surgeon, try to frequently move the fingers, wrist, elbow, and/or shoulder that are outside of the splint or cast  You can do this gently and slowly   This improves blood flow, which limits swelling and prevents bandages from feeling tight  It may be uncomfortable to move at first, but the discomfort will often improve with time and frequently improves with motion  Your surgeon will be more specific about what to move and what to rest    Ice the area: Icing the painful area will typically reduce swelling and inflammation and reduce pain  However, there may be certain procedures (such as surgery on arteries, skin grafts or flaps) where ice could be harmful, so consult your surgeon before using ice  Heat the area: If you are in the phase of care where you can remove your dressing or splint, you may be able to try heat  Heat increases blood flow to an area and can help with muscle spasms, muscle soreness and joint pain  Avoid smoking: Chemicals present in cigarettes can increase pain  Reducing or quitting smoking can improve your pain  Consume vitamin C: Consuming 500mg of vitamin C daily for 6 weeks may reduce pain after some injuries  However, it is ascorbic acid, which can upset your stomach if you have heartburn or gastritis  Post-Surgery Medication Management  The pain-management methods listed above are often effective when used in combination with taking medications post-surgery  There are many different classes of medication that can help pain  Some can be purchased over the counter, and some require a prescription  All medicines can have some benefits and some adverse reactions/side effects  Your surgical team will balance these issues to provide a plan for you  Some commonly prescribed medications can include:   Tylenol (Acetaminophen)   Aleve (Naprosyn/naproxen)   Motrin/Advil (Ibuprofen)   Celebrex (Celecoxib)   Toradol (Ketorolac)    When taking medication, keep the following in mind: It may take 30-60 minutes for your body to absorb the medication after you take it by mouth, so be patient     Longer-acting medications used before bedtime may help you sleep better the first few nights after surgery  The first few nights post-surgery will generally be the toughest    Do not exceed the dose recommended by your physician or combine medications without consulting with your physician  If you are unfamiliar with these medications, your surgeon can specify how much medication you should take, for how long, and how often  Opioids  Opioids are a type of pain medication made from the poppy plant that is used to make opium and heroin  They can be effective in treating pain, but opioids should be used at a last resort, in limited amounts, and for a limited number of days  Use of these medications should only be done under the guidance of your doctor  When taking opioids, you are at risk of becoming dependent on the medicine, and they may become less effective over time  Oxycodone and hydrocodone are two of the most commonly used and effective opioid “pain” pills  These pills are frequently combined with Tylenol (acetaminophen), but it must be done carefully  Be sure to consult your surgeon before doing so  Your surgeon will give you a customized plan for managing your pain based on your type of surgery, number of procedures, duration of surgery, etc  Keep in mind that many opioids are combined with Tylenol (acetaminophen) already in the pill, so take care to follow your prescribed directions and not to take more opioids or acetaminophen than prescribed  Overdoses of each of these medications can be dangerous and life threatening  Learn more about opioids, including the side effects, how to safely use them, and how to properly dispose of any extras  Following the program below will greatly decrease your post-operative pain  1  Aleve (naproxen) 220 mg and Tylenol Arthritis 650 mg on the afternoon/evening of surgery   Do NOT take Aleve if you have a history of gastric ulcers, uncontrolled reflux or have been told previously by a physician that you should not take anti-inflammatory medications such as Advil/Aleve/Motrin  2  Aleve (naproxen) 220 mg in the morning and afternoon, for about 2-3 days after the surgery; even if you have no pain  You can stop two days after surgery if your hand does not hurt  3  Tylenol Arthritis (or any brand of acetaminophen 8-hour), 650 mg every eight hours, with a maximum dose of 3000 mg per day, for about 2-3 days after the surgery, even if you have no pain  Tylenol Arthritis plus Aleve is a case of 1+1=3, not 2  That is, they work together as a team to make each other stronger a  The maximum amount of Tylenol is 3000 mg per day, which is the same as 4 of the 650 mg pills  Remember; don't substitute any other medication for the Tylenol: don't take Motrin, aspirin, or any other over the counter medication  It must be Tylenol (or any brand of acetaminophen) for it to work as a team  Remember as well that Tylenol Arthritis is taken every 8 hours, the Aleve is twice a day  4  Norco (hydrocodone/acetaminophen) 5/325 mg or a similar medication to assist with sleeping at night, and possibly every 6 hours during the day, ONLY IF NEEDED, for the first few days  You will be given a written prescription, but many patients find they do not need to take any or all of the Norco  Do not take the medication just because it was given to you; only take it if you need it  Remember that Wilhemena Lemme is an opioid pain medication and can lead to addiction, respiratory sedation, and death  In 2015 over 17,500 Americans  from opioid overdoses and I don't want this to happen to you  Opioid medications can also cause constipation, so please plan for that, as well as possible mental confusion and drowsiness  Do not drive while you are taking this medication  With this protocol, you can expect your post-operative pain to be very manageable  The worst pain only lasts for the first 48 hours and improves significantly after that   By the time you see your surgeon for your post-operative visit you probably will no longer require any pain medication on a daily basis  Important Information about Painkillers  You are being prescribed an opioid pain medication to help with severe pain after surgery  Use the medication sparingly as needed to reduce your pain  The goal is not to be pain-free, but to make the pain more tolerable  If you are able to take non-steroidal anti-inflammatory drugs (NSAIDs), alternate the prescription pain medication with over-the-counter Ibuprofen or Naproxen (if you do not have a history of reflux or stomach ulcers)  This will allow you to take less opioid medication  Also, elevate the hand to reduce swelling and consider applying ice to the affected area for 15 minutes at a time, several times per day  Opioid medication is powerful and has the risk of overdose, abuse, and addiction  Only use the medication as directed by your physician and keep the pills in a safe place  When you no longer need the medication, please dispose of the pills properly as directed below  Allowing someone else to use your opioid prescription is illegal   Also, possible side effects from opioid medications are over-sedation, itching, nausea/vomiting, and constipation  Do not drive a vehicle or operate machinery while taking the pain medications  Drink plenty of fluids and consider a stool softener to prevent constipation  If the pain you are experiencing is not severe, stop taking the opioid pills and only take over-the-counter medications such as Tylenol and Ibuprofen  Disposing of unused pain medications:  (1) Follow pharmacist instructions on the bottle if available, or  (2) Call 5-719.355.7309 for a [facility] authorized collection site in your area, or  (3) If no collection site is available in your area, mix the pills with an undesirable substance such as used coffee grounds, alix litter, or dirt  Place this mixture in a sealed plastic bag  Place the bag in the household garbage    Adapted from the opioid awareness section of the American Society for [employer] of the Hand, thanks to Tamara Pena and Kyle Cleaves

## 2023-01-23 NOTE — PROGRESS NOTES
ASSESSMENT/PLAN:    Assessment:   Right long finger mass     Plan:   Patient will proceed with a right long finger mass excision under local  Patient was advised to discontinue her omega 3 5 days prior  Patient would not like any narcotics prescribed for her surgery    Follow Up: After Surgery    To Do Next Visit:    and Sutures out    General Discussions:       Operative Discussions:     Standard Consent: The risks and benefits of the procedure were explained to the patient, which include, but are not limited to: Bleeding, infection, recurrence, pain, scar, damage to tendons, damage to nerves, and damage to blood vessels, failure to give desired results and complications related to anesthesia  These risks, along with alternative conservative treatment options, and postoperative protocols were voiced back and understood by the patient  All questions were answered to the patient's satisfaction  The patient agrees to comply with a standard postoperative protocol, and is willing to proceed  Education was provided via written and auditory forms  There were no barriers to learning  Written handouts regarding wound care, incision and scar care, and general preoperative information was provided to the patient  Prior to surgery, the patient may be requested to stop all anti-inflammatory medications  Prophylactic aspirin, Plavix, and Coumadin may be allowed to be continued  Medications including vitamin E , ginkgo, and fish oil are requested to be stopped approximately one week prior to surgery  Hypertensive medications and beta blockers, if taken, should be continued  _____________________________________________________  CHIEF COMPLAINT:  Chief Complaint   Patient presents with   • Right Middle Finger - Cyst         SUBJECTIVE:  Jeremy Szymanski is a 69 y o  female who presents with mass to the right long finger  This started  3 month(s) ago as Insidious  She states that this fluctuates in size    Radiation: Yes to the  long finger  Previous Treatments: activity modification without relief  Associated symptoms: No Complaints  Handedness: right    PAST MEDICAL HISTORY:  Past Medical History:   Diagnosis Date   • Cataract    • COPD (chronic obstructive pulmonary disease) ([facility] [address] )    • History of total hysterectomy    • Hyperlipidemia    • Hypothyroid    • Osteoarthritis        PAST SURGICAL HISTORY:  Past Surgical History:   Procedure Laterality Date   • BLEPHAROPLASTY Bilateral     upper lid - Dr Margaret Vogel   • CATARACT EXTRACTION, BILATERAL     • COLONOSCOPY     • HYSTERECTOMY     • REVISION COLOSTOMY     • SHOULDER SURGERY      S/P dislocated shoulder       FAMILY HISTORY:  Family History   Problem Relation Age of Onset   • Breast cancer Mother    • Hypertension Mother    • Alcohol abuse Father    • Drug abuse Sister        SOCIAL HISTORY:  Social History     Tobacco Use   • Smoking status: Former     Packs/day: 3 00     Years: 14 00     Pack years: 42 00     Types: Cigarettes     Start date:      Quit date:      Years since quittin 0   • Smokeless tobacco: Never   Vaping Use   • Vaping Use: Never used   Substance Use Topics   • Alcohol use: Not Currently     Comment: per [employer]-stopped drinking alcohol   • Drug use: No     Comment: per Allscripts-recovering from drug addiction       MEDICATIONS:    Current Outpatient Medications:   •  Ascorbic Acid (vitamin C) 1000 MG tablet, Take 1,000 mg by mouth daily, Disp: , Rfl:   •  celecoxib (CeleBREX) 200 mg capsule, TAKE 1 CAPSULE DAILY WITH FOOD AS NEEDED FOR ARTHRITIS PAIN, Disp: 30 capsule, Rfl: 5  •  fluticasone (FLONASE) 50 mcg/act nasal spray, 2 sprays into each nostril daily, Disp: , Rfl:   •  ipratropium (ATROVENT) 0 03 % nasal spray, USE 2 SPRAYS BOTH NOSTRILS 3 TIMES A DAY AS NEEDED, Disp: 30 mL, Rfl: 5  •  levothyroxine 50 mcg tablet, TAKE 1 TABLET (50 MCG TOTAL) BY MOUTH DAILY IN THE EARLY MORNING, Disp: 90 tablet, Rfl: 3  • loratadine-pseudoephedrine (CLARITIN-D 24-HOUR)  mg per 24 hr tablet, Take 1 tablet by mouth daily, Disp: , Rfl:   •  metroNIDAZOLE (METROGEL) 0 75 % gel, Apply to face twice daily for rosacea (Patient taking differently: Apply to face twice daily for rosacea prn), Disp: 45 g, Rfl: 1  •  montelukast (SINGULAIR) 10 mg tablet, TAKE 1 TABLET BY MOUTH EVERY DAY, Disp: 90 tablet, Rfl: 3  •  Multiple Vitamins-Minerals (multivitamin with minerals) tablet, Take 1 tablet by mouth daily, Disp: , Rfl:   •  Omega-3 Fatty Acids (FISH OIL) 1,000 mg, Take 3,000 mg by mouth daily, Disp: , Rfl:   •  RESTASIS 0 05 % ophthalmic emulsion, , Disp: , Rfl:   •  rosuvastatin (CRESTOR) 5 mg tablet, TAKE 1 TABLET BY MOUTH EVERY DAY, Disp: 90 tablet, Rfl: 1  •  Spiriva HandiHaler 18 MCG inhalation capsule, PLACE 1 CAPSULE INTO INHALER AND INHALE DAILY VIA HANDIHALER AT THE SAME TIME EVERY DAY, Disp: 90 capsule, Rfl: 3  •  Symbicort 160-4 5 MCG/ACT inhaler, INHALE 2 PUFFS BY MOUTH 2 TIMES A DAY RINSE MOUTH AFTER USE , Disp: 10 2 g, Rfl: 3    ALLERGIES:  Allergies   Allergen Reactions   • Alcohol - Food Allergy    • Other      narcotics     • Mobic [Meloxicam] GI Intolerance       REVIEW OF SYSTEMS:  Pertinent items are noted in HPI      LABS:  HgA1c: No results found for: HGBA1C  BMP:   Lab Results   Component Value Date    GLUCOSE 83 12/02/2013    CALCIUM 9 4 11/30/2022     12/02/2013    K 4 4 11/30/2022    CO2 24 11/30/2022     (H) 11/30/2022    BUN 17 11/30/2022    CREATININE 0 72 11/30/2022         _____________________________________________________  PHYSICAL EXAMINATION:  Vital signs: /79   Pulse 86   Ht 5' 10" (1 778 m)   Wt 79 4 kg (175 lb)   BMI 25 11 kg/m²   General: well developed and well nourished, alert, oriented times 3 and appears comfortable  Psychiatric: Normal  HEENT: Trachea Midline, No torticollis  Cardiovascular: No discernable arrhythmia  Pulmonary: No wheezing or stridor  Abdomen: No rebound or guarding  Extremities: No peripheral edema  Skin: No Erythema  Neurovascular: Sensation Intact to the Median, Ulnar, Radial Nerve, Motor Intact to the Median, Ulnar, Radial Nerve and Pulses Intact    MUSCULOSKELETAL EXAMINATION:  RIGHT SIDE: 2cm x 8mm dorsal side of long finger, mobile, encapsulated, no overlying skin changes, no attached to extensor or flexor tendons     _____________________________________________________  STUDIES REVIEWED:  No Studies to review      PROCEDURES PERFORMED:  Procedures  No Procedures performed today   Scribe Attestation    I,:  Lilly Lomeli am acting as a scribe while in the presence of the attending physician :       I,:  Eli Reno MD personally performed the services described in this documentation    as scribed in my presence :

## 2023-01-24 ENCOUNTER — PREP FOR PROCEDURE (OUTPATIENT)
Dept: OBGYN CLINIC | Facility: CLINIC | Age: 68
End: 2023-01-24

## 2023-03-14 ENCOUNTER — HOSPITAL ENCOUNTER (OUTPATIENT)
Facility: HOSPITAL | Age: 68
Setting detail: OUTPATIENT SURGERY
Discharge: HOME | End: 2023-03-14
Attending: ORTHOPAEDIC SURGERY | Admitting: ORTHOPAEDIC SURGERY

## 2023-03-14 VITALS
SYSTOLIC BLOOD PRESSURE: 151 MMHG | DIASTOLIC BLOOD PRESSURE: 80 MMHG | HEART RATE: 57 BPM | HEIGHT: 70 IN | WEIGHT: 172 LBS | BODY MASS INDEX: 24.62 KG/M2 | OXYGEN SATURATION: 95 % | RESPIRATION RATE: 16 BRPM | TEMPERATURE: 97.1 F

## 2023-03-14 DIAGNOSIS — R22.31 MASS OF FINGER OF RIGHT HAND: ICD-10-CM

## 2023-03-14 DIAGNOSIS — R22.31 FINGER MASS, RIGHT: Primary | ICD-10-CM

## 2023-03-14 RX ORDER — ONDANSETRON 2 MG/ML
4 INJECTION INTRAMUSCULAR; INTRAVENOUS EVERY 6 HOURS PRN
Status: CANCELLED | OUTPATIENT
Start: 2023-03-14

## 2023-03-14 RX ORDER — TRAMADOL HYDROCHLORIDE 50 MG/1
50 TABLET ORAL EVERY 6 HOURS PRN
Status: CANCELLED | OUTPATIENT
Start: 2023-03-14

## 2023-03-14 RX ORDER — SENNOSIDES 8.6 MG
650 CAPSULE ORAL EVERY 8 HOURS PRN
Qty: 30 TABLET | Refills: 0 | Status: SHIPPED | OUTPATIENT
Start: 2023-03-14 | End: 2023-03-27 | Stop reason: HOSPADM

## 2023-03-14 RX ORDER — ACETAMINOPHEN 325 MG/1
650 TABLET ORAL EVERY 6 HOURS PRN
Status: CANCELLED | OUTPATIENT
Start: 2023-03-14

## 2023-03-14 RX ORDER — COVID-19 ANTIGEN TEST
220 KIT MISCELLANEOUS 2 TIMES DAILY
Qty: 60 CAPSULE | Refills: 0 | Status: SHIPPED | OUTPATIENT
Start: 2023-03-14 | End: 2023-03-27 | Stop reason: HOSPADM

## 2023-03-14 RX ORDER — LIDOCAINE HYDROCHLORIDE AND EPINEPHRINE 10; 10 MG/ML; UG/ML
20 INJECTION, SOLUTION INFILTRATION; PERINEURAL ONCE
Status: DISCONTINUED | OUTPATIENT
Start: 2023-03-14 | End: 2023-03-14 | Stop reason: HOSPADM

## 2023-03-14 RX ORDER — MAGNESIUM HYDROXIDE 1200 MG/15ML
LIQUID ORAL AS NEEDED
Status: DISCONTINUED | OUTPATIENT
Start: 2023-03-14 | End: 2023-03-14 | Stop reason: HOSPADM

## 2023-03-14 RX ADMIN — SODIUM BICARBONATE: 84 INJECTION, SOLUTION INTRAVENOUS at 09:00

## 2023-03-14 NOTE — OP NOTE
OPERATIVE REPORT  PATIENT NAME: Avery Rubalcava  :  1955  MRN: [id number]  Pt Location: [facility]    SURGERY DATE: 23    Surgeon(s) and Role:     * Mahin Workman MD - Primary     * Diego Gonzalez PA-C - Assisting    Pre-Op Diagnosis:  Mass of finger of right hand [R22 31]    Post-Op Diagnosis:  Ganglion cyst right long finger dorsal proximal phalanx    Procedure(s) (LRB):  right long finger ganglion cyst 3 cm x 1 cm x 1 cm excision (Right)    Specimen(s):  Order Name Source Comment Collection Info Order Time   TISSUE EXAM Finger, Right  Collected By: Mahin Workman MD 3/14/2023  9:41 AM     Release to patient through Mychart   Immediate            Estimated Blood Loss:   Minimal      Anesthesia Type:   Local    Operative Indications: The patient has a history of mass to the right hand long finger proximal phalanx that was recalcitrant to conservative management  The decision was made to bring the patient to the operating room for right hand long finger ganglion cyst excision  Risks of the procedure were explained which include, but are not limited to bleeding; infection; damage to nerves, arteries,veins, tendons; scar; pain; need for reoperation; failure to give desired result; and risks of anaesthesia  All questions were answered to satisfaction and they were willing to proceed  Operative Findings:  Right hand long finger ganglion cyst 3 cm x 1 cm x 1 cm    Complications:   None    Procedure and Technique:  After the patient, site, and procedure were identified, the patient was brought into the operating room in a supine position  Local anaesthesia was adminstered in the preoperative holding area  A tourniquet was not used  The  right upper extremity was then prepped and drapped in a normal, sterile, orthopedic fashion  After the patient, site, and procedure were once again identified, attention was turned to the right long finger    An incision centered over the ganglion cyst was made  Care was taken to dissect down while maintaining hemostasis and protecting the superficial and deep neurovascular structures  The surrounding tendon structures were also protected  The ganglion cyst was identified and circumferentially dissected free from all surrounding structures  The stalk was identified arising from the dorsal aspect of the extensor tendon of the right long finger proximal phalanx and excised with a small cuff of tissue at the base to try to prevent recurrence  At the completion of the procedure, hemostasis was obtained with cautery and direct pressure  The wounds were copiously irrigated with sterile solution  The wounds were closed with Prolene  Sterile dressings were applied, including Xeroform, Gauze and Finger Dressing  Please note, all sponge, needle, and instrument counts were correct prior to closure  Loupe magnification was utilized  The patient tolerated the procedure well       I was present for all critical portions of the procedure, A qualified resident physician was not available and A physician assistant was required during the procedure for retraction tissue handling,dissection and suturing    Patient Disposition:  PACU  and hemodynamically stable    SIGNATURE: Michelle Pool MD  DATE: 03/14/23  TIME: 9:45 AM

## 2023-03-14 NOTE — H&P
H&P Exam - Orthopedics   Radha Graham 69 y o  female MRN: [id number]  Unit/Bed#: P309 A PRE    Assessment/Plan   Assessment:  Right long finger mass    Plan:  Right long finger mass excision under local    History of Present Illness   HPI:  Radha Graham is a 69 y o  female who presents with right long finger mass  She states its progressively gotten bigger  She states she would like to have it removed at this time  She would like to proceed with surgical intervention      Historical Information  Review Of Systems:   · Skin: Normal  · Neuro: See HPI  · Musculoskeletal: See HPI  · 14 point review of systems negative except as stated above     Past Medical History:   Past Medical History:   Diagnosis Date   • Cataract    • COPD (chronic obstructive pulmonary disease) ([facility] [address] )    • History of total hysterectomy    • Hyperlipidemia    • Hypothyroid    • Osteoarthritis        Past Surgical History:   Past Surgical History:   Procedure Laterality Date   • BLEPHAROPLASTY Bilateral     upper lid - Dr Verónica Cooper   • CATARACT EXTRACTION, BILATERAL     • COLONOSCOPY     • HYSTERECTOMY     • REVISION COLOSTOMY     • SHOULDER SURGERY      S/P dislocated shoulder       Family History:  Family history reviewed and non-contributory  Family History   Problem Relation Age of Onset   • Breast cancer Mother    • Hypertension Mother    • Alcohol abuse Father    • Drug abuse Sister        Social History:  Social History     Socioeconomic History   • Marital status: Single     Spouse name: None   • Number of children: None   • Years of education: None   • Highest education level: None   Occupational History   • 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   Tobacco Use   • Smoking status: Former     Packs/day: 3 00     Years: 14 00     Pack years: 42 00     Types: Cigarettes     Start date:      Quit date:      Years since quittin 2   • Smokeless tobacco: Never   Vaping Use   • Vaping Use: Never used   Substance and Sexual Activity   • Alcohol use: Not Currently     Comment: per Allscripts-stopped drinking alcohol   • Drug use: No     Comment: per Allscripts-recovering from drug addiction   • Sexual activity: Not Currently   Other Topics Concern   • None   Social History Narrative    Daily coffee consumption 4 cups per day     Social Determinants of Health     Financial Resource Strain: Low Risk    • Difficulty of Paying Living Expenses: Not very hard   Food Insecurity: Not on file   Transportation Needs: No Transportation Needs   • Lack of Transportation (Medical): No   • Lack of Transportation (Non-Medical): No   Physical Activity: Not on file   Stress: Not on file   Social Connections: Not on file   Intimate Partner Violence: Not on file   Housing Stability: Not on file       Allergies: Allergies   Allergen Reactions   • Alcohol - Food Allergy    • Other      narcotics     • Mobic [Meloxicam] GI Intolerance           Labs:  0   Lab Value Date/Time    HCT 43 8 11/30/2022 0943    HCT 42 4 04/19/2022 1009    HCT 42 7 08/28/2020 1014    HCT 39 5 12/02/2013 1507    HGB 14 0 11/30/2022 0943    HGB 14 1 04/19/2022 1009    HGB 13 7 08/28/2020 1014    HGB 12 9 12/02/2013 1507    INR 0 91 07/18/2017 1024    WBC 6 19 11/30/2022 0943    WBC 5 65 04/19/2022 1009    WBC 6 40 08/28/2020 1014    WBC 5 77 12/02/2013 1507    ESR 14 08/28/2020 1014    CRP 10 0 (H) 08/28/2020 1014       Meds:    Current Facility-Administered Medications:   •  lidocaine-epinephrine (XYLOCAINE/EPINEPHRINE) 1 %-1:100,000 20 mL, sodium bicarbonate 2 mEq infiltration, , Infiltration, Once, Valentin Yao MD  •  lidocaine-epinephrine (XYLOCAINE/EPINEPHRINE) 1 %-1:100,000 injection 20 mL, 20 mL, Infiltration, Once, Valentin Yao MD    Blood Culture:   No results found for: BLOODCX    Wound Culture:   No results found for: WOUNDCULT    Ins and Outs:  No intake/output data recorded              Physical Exam  /57   Pulse 62   Temp (!) 97 1 °F (36 2 °C) (Temporal)   Resp 16   Ht 5' 10" (1 778 m)   Wt 78 kg (172 lb)   SpO2 97%   BMI 24 68 kg/m²   /57   Pulse 62   Temp (!) 97 1 °F (36 2 °C) (Temporal)   Resp 16   Ht 5' 10" (1 778 m)   Wt 78 kg (172 lb)   SpO2 97%   BMI 24 68 kg/m²   Gen: No acute distress, resting comfortably in bed  HEENT: Eyes clear, moist mucus membranes, hearing intact  Respiratory: No audible wheezing or stridor  Cardiovascular: Well Perfused peripherally, 2+ distal pulse  Abdomen: nondistended, no peritoneal signs  Ortho Exam: Right long finger  2 cm x 8 mm dorsal side of the long finger, mobile, encapsulated, no overlying skin changes, no attachment to the extensor or flexor tendons    Neuro Exam: Patient is neurovascular intact for median, radial, ulnar nerve distribution    Capillary refill less than 2 seconds    Lab Results: Reviewed  Imaging: Reviewed

## 2023-03-27 ENCOUNTER — OFFICE VISIT (OUTPATIENT)
Dept: OBGYN CLINIC | Facility: CLINIC | Age: 68
End: 2023-03-27

## 2023-03-27 VITALS
WEIGHT: 172 LBS | BODY MASS INDEX: 24.62 KG/M2 | DIASTOLIC BLOOD PRESSURE: 82 MMHG | SYSTOLIC BLOOD PRESSURE: 140 MMHG | HEIGHT: 70 IN

## 2023-03-27 DIAGNOSIS — Z47.89 AFTERCARE FOLLOWING SURGERY OF THE MUSCULOSKELETAL SYSTEM: Primary | ICD-10-CM

## 2023-03-27 NOTE — PROGRESS NOTES
"Assessment:   S/P right long finger ganglion cyst 3 cm x 1 cm x 1 cm excision - Right on 3/14/2023    Plan:   Resume activities as tolerated  - use pain as a guide  Resume normal hygiene activities  - do not soak for 3-4 more days  Use sunscreen for scar management    Follow Up:  PRN      CHIEF COMPLAINT:  Chief Complaint   Patient presents with   • Right Middle Finger - Post-op, Suture / Staple Removal     Cyst excision- 3/16/23         SUBJECTIVE:  Diedra Kocher is a 69 y o  female who presents for follow up after right long finger ganglion cyst 3 cm x 1 cm x 1 cm excision - Right on 3/14/2023  Today patient has No Complaints  She denies any pain, numbness/tingling, or signs of an infection        PHYSICAL EXAMINATION:  Vital signs: /82   Ht 5' 10\" (1 778 m)   Wt 78 kg (172 lb)   BMI 24 68 kg/m²   General: well developed and well nourished, alert, oriented times 3 and appears comfortable  Psychiatric: Normal    MUSCULOSKELETAL EXAMINATION:  Incision: Clean, dry, intact  Range of Motion: As expected  Neurovascular status: Neuro intact, good cap refill  Activity Restrictions: No restrictions  Done today: Sutures out and Steri strips applied      STUDIES REVIEWED:  Pathology reviewed:  Ganglion cyst      PROCEDURES PERFORMED:  Procedures  No Procedures performed today    "

## 2023-04-05 DIAGNOSIS — E03.9 HYPOTHYROIDISM, UNSPECIFIED TYPE: ICD-10-CM

## 2023-04-05 RX ORDER — LEVOTHYROXINE SODIUM 0.05 MG/1
50 TABLET ORAL
Qty: 90 TABLET | Refills: 1 | Status: SHIPPED | OUTPATIENT
Start: 2023-04-05

## 2023-06-01 ENCOUNTER — RA CDI HCC (OUTPATIENT)
Dept: OTHER | Facility: HOSPITAL | Age: 68
End: 2023-06-01

## 2023-06-01 NOTE — PROGRESS NOTES
[facility] [address]  coding opportunities       Chart reviewed, no opportunity found: CHART REVIEWED, NO OPPORTUNITY FOUND        Patients Insurance     Medicare Insurance: Medicare

## 2023-06-05 ENCOUNTER — APPOINTMENT (OUTPATIENT)
Dept: LAB | Facility: CLINIC | Age: 68
End: 2023-06-05
Payer: MEDICARE

## 2023-06-05 DIAGNOSIS — Z80.3 FAMILY HISTORY OF BREAST CANCER IN MOTHER: ICD-10-CM

## 2023-06-05 DIAGNOSIS — E03.9 ACQUIRED HYPOTHYROIDISM: ICD-10-CM

## 2023-06-05 DIAGNOSIS — Z00.00 HEALTHCARE MAINTENANCE: ICD-10-CM

## 2023-06-05 DIAGNOSIS — J44.9 CHRONIC OBSTRUCTIVE PULMONARY DISEASE, UNSPECIFIED COPD TYPE (HCC): ICD-10-CM

## 2023-06-05 DIAGNOSIS — M15.9 PRIMARY OSTEOARTHRITIS INVOLVING MULTIPLE JOINTS: ICD-10-CM

## 2023-06-05 DIAGNOSIS — L71.9 ROSACEA: ICD-10-CM

## 2023-06-05 DIAGNOSIS — E78.2 MIXED HYPERLIPIDEMIA: ICD-10-CM

## 2023-06-05 DIAGNOSIS — R22.31 MASS OF FINGER OF RIGHT HAND: ICD-10-CM

## 2023-06-05 LAB
ALBUMIN SERPL BCP-MCNC: 3.7 G/DL (ref 3.5–5)
ALP SERPL-CCNC: 52 U/L (ref 46–116)
ALT SERPL W P-5'-P-CCNC: 31 U/L (ref 12–78)
ANION GAP SERPL CALCULATED.3IONS-SCNC: 1 MMOL/L (ref 4–13)
AST SERPL W P-5'-P-CCNC: 22 U/L (ref 5–45)
BACTERIA UR QL AUTO: ABNORMAL /HPF
BILIRUB SERPL-MCNC: 0.47 MG/DL (ref 0.2–1)
BILIRUB UR QL STRIP: NEGATIVE
BUN SERPL-MCNC: 12 MG/DL (ref 5–25)
CALCIUM SERPL-MCNC: 9.6 MG/DL (ref 8.3–10.1)
CHLORIDE SERPL-SCNC: 111 MMOL/L (ref 96–108)
CHOLEST SERPL-MCNC: 200 MG/DL
CLARITY UR: CLEAR
CO2 SERPL-SCNC: 26 MMOL/L (ref 21–32)
COLOR UR: YELLOW
CREAT SERPL-MCNC: 0.89 MG/DL (ref 0.6–1.3)
ERYTHROCYTE [DISTWIDTH] IN BLOOD BY AUTOMATED COUNT: 13.1 % (ref 11.6–15.1)
GFR SERPL CREATININE-BSD FRML MDRD: 66 ML/MIN/1.73SQ M
GLUCOSE P FAST SERPL-MCNC: 94 MG/DL (ref 65–99)
GLUCOSE UR STRIP-MCNC: NEGATIVE MG/DL
HCT VFR BLD AUTO: 42.7 % (ref 34.8–46.1)
HDLC SERPL-MCNC: 85 MG/DL
HGB BLD-MCNC: 14.1 G/DL (ref 11.5–15.4)
HGB UR QL STRIP.AUTO: NEGATIVE
KETONES UR STRIP-MCNC: NEGATIVE MG/DL
LDLC SERPL CALC-MCNC: 99 MG/DL (ref 0–100)
LEUKOCYTE ESTERASE UR QL STRIP: ABNORMAL
MCH RBC QN AUTO: 30.1 PG (ref 26.8–34.3)
MCHC RBC AUTO-ENTMCNC: 33 G/DL (ref 31.4–37.4)
MCV RBC AUTO: 91 FL (ref 82–98)
MUCOUS THREADS UR QL AUTO: ABNORMAL
NITRITE UR QL STRIP: NEGATIVE
NON-SQ EPI CELLS URNS QL MICRO: ABNORMAL /HPF
PH UR STRIP.AUTO: 5.5 [PH]
PLATELET # BLD AUTO: 281 THOUSANDS/UL (ref 149–390)
PMV BLD AUTO: 10.5 FL (ref 8.9–12.7)
POTASSIUM SERPL-SCNC: 4.1 MMOL/L (ref 3.5–5.3)
PROT SERPL-MCNC: 7.5 G/DL (ref 6.4–8.4)
PROT UR STRIP-MCNC: ABNORMAL MG/DL
RBC # BLD AUTO: 4.69 MILLION/UL (ref 3.81–5.12)
RBC #/AREA URNS AUTO: ABNORMAL /HPF
SODIUM SERPL-SCNC: 138 MMOL/L (ref 135–147)
SP GR UR STRIP.AUTO: 1.02 (ref 1–1.03)
T4 SERPL-MCNC: 10.09 UG/DL (ref 6.09–12.23)
TRIGL SERPL-MCNC: 82 MG/DL
TSH SERPL DL<=0.05 MIU/L-ACNC: 3.32 UIU/ML (ref 0.45–4.5)
UROBILINOGEN UR STRIP-ACNC: <2 MG/DL
WBC # BLD AUTO: 4.98 THOUSAND/UL (ref 4.31–10.16)
WBC #/AREA URNS AUTO: ABNORMAL /HPF

## 2023-06-05 PROCEDURE — 36415 COLL VENOUS BLD VENIPUNCTURE: CPT

## 2023-06-05 PROCEDURE — 84443 ASSAY THYROID STIM HORMONE: CPT

## 2023-06-05 PROCEDURE — 81001 URINALYSIS AUTO W/SCOPE: CPT

## 2023-06-05 PROCEDURE — 85027 COMPLETE CBC AUTOMATED: CPT

## 2023-06-05 PROCEDURE — 84436 ASSAY OF TOTAL THYROXINE: CPT

## 2023-06-05 PROCEDURE — 80061 LIPID PANEL: CPT

## 2023-06-05 PROCEDURE — 80053 COMPREHEN METABOLIC PANEL: CPT

## 2023-06-08 ENCOUNTER — OFFICE VISIT (OUTPATIENT)
Dept: FAMILY MEDICINE CLINIC | Facility: CLINIC | Age: 68
End: 2023-06-08
Payer: MEDICARE

## 2023-06-08 VITALS
RESPIRATION RATE: 16 BRPM | HEART RATE: 75 BPM | TEMPERATURE: 97.1 F | WEIGHT: 173.4 LBS | HEIGHT: 70 IN | SYSTOLIC BLOOD PRESSURE: 124 MMHG | BODY MASS INDEX: 24.82 KG/M2 | DIASTOLIC BLOOD PRESSURE: 66 MMHG

## 2023-06-08 DIAGNOSIS — J30.9 ALLERGIC RHINITIS, UNSPECIFIED SEASONALITY, UNSPECIFIED TRIGGER: ICD-10-CM

## 2023-06-08 DIAGNOSIS — D48.5 NEOPLASM OF UNCERTAIN BEHAVIOR OF SKIN OF FACE: ICD-10-CM

## 2023-06-08 DIAGNOSIS — E03.9 ACQUIRED HYPOTHYROIDISM: Primary | ICD-10-CM

## 2023-06-08 DIAGNOSIS — J44.9 CHRONIC OBSTRUCTIVE PULMONARY DISEASE, UNSPECIFIED COPD TYPE (HCC): ICD-10-CM

## 2023-06-08 DIAGNOSIS — Z80.3 FAMILY HISTORY OF BREAST CANCER IN MOTHER: ICD-10-CM

## 2023-06-08 DIAGNOSIS — M89.9 BONE DISORDER: ICD-10-CM

## 2023-06-08 DIAGNOSIS — E78.2 MIXED HYPERLIPIDEMIA: ICD-10-CM

## 2023-06-08 DIAGNOSIS — H69.80 DYSFUNCTION OF EUSTACHIAN TUBE, UNSPECIFIED LATERALITY: ICD-10-CM

## 2023-06-08 DIAGNOSIS — L71.9 ROSACEA: ICD-10-CM

## 2023-06-08 DIAGNOSIS — M15.9 PRIMARY OSTEOARTHRITIS INVOLVING MULTIPLE JOINTS: ICD-10-CM

## 2023-06-08 PROBLEM — H26.9 BILATERAL CATARACTS: Status: RESOLVED | Noted: 2017-07-17 | Resolved: 2023-06-08

## 2023-06-08 PROCEDURE — 99214 OFFICE O/P EST MOD 30 MIN: CPT | Performed by: FAMILY MEDICINE

## 2023-06-08 NOTE — PATIENT INSTRUCTIONS
Follow-up with dermatology for skin lesion and rosacea of face  Continue present therapy  I recommend completion of mammogram   Because of your family history in mother of breast cancer you are at a higher risk of breast cancer  Return in 6 months for office visit, blood work, and [employer]

## 2023-06-08 NOTE — ASSESSMENT & PLAN NOTE
Reminded patient to complete mammography    Patient did verbalize understanding she is increased risk of breast cancer secondary to breast cancer in mother

## 2023-06-08 NOTE — PROGRESS NOTES
Name: Bennie Mancia      : 1955      MRN: [id number]  Encounter Provider: Diego Kenney DO  Encounter Date: 2023   Encounter department: [facility] PRIMARY CARE    Assessment & Plan     1  Hypothyroidism, stable continue present therapy  2  COPD, stable follows with pulm medicine  3  For allergic rhinitis/eustachian tube dysfunction, stable continue present therapy  4  Rosacea #5  Skin neoplasm of face  Refer to dermatology for further evaluation definitive treatment  6  DJD/bone disorder continue Celebrex check vitamin D  7  Hyperlipidemia, stable continue Crestor  8  Family history of breast cancer mother  Patient verbalized understanding she is at increased risk of breast cancer recommend she complete mammography that was ordered  9  Patient to return in 6 months for office visit, blood work, and AWV      1  Acquired hypothyroidism  Assessment & Plan:  Stable continue levothyroxine 50 mcg daily    Orders:  -     CBC; Future; Expected date: 2023  -     Comprehensive metabolic panel; Future; Expected date: 2023  -     Lipid Panel with Direct LDL reflex; Future; Expected date: 2023  -     TSH, 3rd generation with Free T4 reflex; Future; Expected date: 2023  -     UA (URINE) with reflex to Scope; Future; Expected date: 2023  -     Vitamin D 25 hydroxy; Future; Expected date: 2023    2  Chronic obstructive pulmonary disease, unspecified [facility]  Assessment & Plan:  Stable follow with pulmonary medicine    Orders:  -     CBC; Future; Expected date: 2023  -     Comprehensive metabolic panel; Future; Expected date: 2023  -     Lipid Panel with Direct LDL reflex; Future; Expected date: 2023  -     TSH, 3rd generation with Free T4 reflex; Future; Expected date: 2023  -     UA (URINE) with reflex to Scope; Future; Expected date: 2023  -     Vitamin D 25 hydroxy; Future; Expected date: 2023    3   Allergic rhinitis, unspecified seasonality, unspecified trigger  Assessment & Plan:  Stable continue present therapy    Orders:  -     CBC; Future; Expected date: 12/08/2023  -     Comprehensive metabolic panel; Future; Expected date: 12/08/2023  -     Lipid Panel with Direct LDL reflex; Future; Expected date: 12/08/2023  -     TSH, 3rd generation with Free T4 reflex; Future; Expected date: 12/08/2023  -     UA (URINE) with reflex to Scope; Future; Expected date: 12/08/2023  -     Vitamin D 25 hydroxy; Future; Expected date: 12/08/2023    4  Dysfunction of Eustachian tube, unspecified laterality  Assessment & Plan:  As above    Orders:  -     CBC; Future; Expected date: 12/08/2023  -     Comprehensive metabolic panel; Future; Expected date: 12/08/2023  -     Lipid Panel with Direct LDL reflex; Future; Expected date: 12/08/2023  -     TSH, 3rd generation with Free T4 reflex; Future; Expected date: 12/08/2023  -     UA (URINE) with reflex to Scope; Future; Expected date: 12/08/2023  -     Vitamin D 25 hydroxy; Future; Expected date: 12/08/2023    5  Rosacea  Assessment & Plan:  Refer to dermatology    Orders:  -     Ambulatory Referral to Dermatology; Future  -     CBC; Future; Expected date: 12/08/2023  -     Comprehensive metabolic panel; Future; Expected date: 12/08/2023  -     Lipid Panel with Direct LDL reflex; Future; Expected date: 12/08/2023  -     TSH, 3rd generation with Free T4 reflex; Future; Expected date: 12/08/2023  -     UA (URINE) with reflex to Scope; Future; Expected date: 12/08/2023  -     Vitamin D 25 hydroxy; Future; Expected date: 12/08/2023    6  Primary osteoarthritis involving multiple joints  Assessment & Plan:  Stable on Celebrex    Orders:  -     CBC; Future; Expected date: 12/08/2023  -     Comprehensive metabolic panel; Future; Expected date: 12/08/2023  -     Lipid Panel with Direct LDL reflex; Future; Expected date: 12/08/2023  -     TSH, 3rd generation with Free T4 reflex;  Future; Expected date: 12/08/2023  -     UA (URINE) with reflex to Scope; Future; Expected date: 12/08/2023  -     Vitamin D 25 hydroxy; Future; Expected date: 12/08/2023    7  Mixed hyperlipidemia  Assessment & Plan: On Crestor 5 mg daily    Orders:  -     CBC; Future; Expected date: 12/08/2023  -     Comprehensive metabolic panel; Future; Expected date: 12/08/2023  -     Lipid Panel with Direct LDL reflex; Future; Expected date: 12/08/2023  -     TSH, 3rd generation with Free T4 reflex; Future; Expected date: 12/08/2023  -     UA (URINE) with reflex to Scope; Future; Expected date: 12/08/2023  -     Vitamin D 25 hydroxy; Future; Expected date: 12/08/2023    8  Family history of breast cancer in mother  Assessment & Plan:  Reminded patient to complete mammography  Patient did verbalize understanding she is increased risk of breast cancer secondary to breast cancer in mother    Orders:  -     CBC; Future; Expected date: 12/08/2023  -     Comprehensive metabolic panel; Future; Expected date: 12/08/2023  -     Lipid Panel with Direct LDL reflex; Future; Expected date: 12/08/2023  -     TSH, 3rd generation with Free T4 reflex; Future; Expected date: 12/08/2023  -     UA (URINE) with reflex to Scope; Future; Expected date: 12/08/2023  -     Vitamin D 25 hydroxy; Future; Expected date: 12/08/2023    9  Neoplasm of uncertain behavior of skin of face  -     Ambulatory Referral to Dermatology; Future  -     CBC; Future; Expected date: 12/08/2023  -     Comprehensive metabolic panel; Future; Expected date: 12/08/2023  -     Lipid Panel with Direct LDL reflex; Future; Expected date: 12/08/2023  -     TSH, 3rd generation with Free T4 reflex; Future; Expected date: 12/08/2023  -     UA (URINE) with reflex to Scope; Future; Expected date: 12/08/2023  -     Vitamin D 25 hydroxy; Future; Expected date: 12/08/2023    10  Bone disorder  -     CBC; Future; Expected date: 12/08/2023  -     Comprehensive metabolic panel;  Future; Expected date: "12/08/2023  -     Lipid Panel with Direct LDL reflex; Future; Expected date: 12/08/2023  -     TSH, 3rd generation with Free T4 reflex; Future; Expected date: 12/08/2023  -     UA (URINE) with reflex to Scope; Future; Expected date: 12/08/2023  -     Vitamin D 25 hydroxy; Future; Expected date: 12/08/2023        Depression Screening and Follow-up Plan: Patient was screened for depression during today's encounter  They screened negative with a PHQ-2 score of 0  Subjective      Patient's only complaint is facial skin lesion right zygomatic area she believes it is a \"age spot\" used over-the-counter cream and dermabrasion has not removed it  And only irritated  Blood work was discussed with the patient    Review of Systems   Constitutional: Negative for chills and fever  HENT: Negative for ear pain and sore throat  Eyes: Negative for pain and visual disturbance  Respiratory: Negative for cough and shortness of breath  Cardiovascular: Negative for chest pain and palpitations  Gastrointestinal: Negative for abdominal pain and vomiting  Endocrine: Negative  Genitourinary: Negative for dysuria and hematuria  Musculoskeletal: Negative for arthralgias and back pain  Skin: Negative for color change and rash  Facial spots   Allergic/Immunologic: Negative  Neurological: Negative for seizures and syncope  Hematological: Negative  Psychiatric/Behavioral: Negative  All other systems reviewed and are negative        Current Outpatient Medications on File Prior to Visit   Medication Sig   • Ascorbic Acid (vitamin C) 1000 MG tablet Take 1,000 mg by mouth daily   • celecoxib (CeleBREX) 200 mg capsule TAKE 1 CAPSULE DAILY WITH FOOD AS NEEDED FOR ARTHRITIS PAIN   • fluticasone (FLONASE) 50 mcg/act nasal spray 2 sprays into each nostril daily   • ipratropium (ATROVENT) 0 03 % nasal spray USE 2 SPRAYS BOTH NOSTRILS 3 TIMES A DAY AS NEEDED   • levothyroxine 50 mcg tablet TAKE 1 TABLET (50 MCG " "TOTAL) BY MOUTH DAILY IN THE EARLY MORNING   • loratadine-pseudoephedrine (CLARITIN-D 24-HOUR)  mg per 24 hr tablet Take 1 tablet by mouth daily   • metroNIDAZOLE (METROGEL) 0 75 % gel Apply to face twice daily for rosacea (Patient taking differently: Apply to face twice daily for rosacea prn)   • montelukast (SINGULAIR) 10 mg tablet TAKE 1 TABLET BY MOUTH EVERY DAY   • Multiple Vitamins-Minerals (multivitamin with minerals) tablet Take 1 tablet by mouth daily   • Omega-3 Fatty Acids (FISH OIL) 1,000 mg Take 3,000 mg by mouth daily   • RESTASIS 0 05 % ophthalmic emulsion    • rosuvastatin (CRESTOR) 5 mg tablet TAKE 1 TABLET BY MOUTH EVERY DAY   • Spiriva HandiHaler 18 MCG inhalation capsule PLACE 1 CAPSULE INTO INHALER AND INHALE DAILY VIA HANDIHALER AT THE SAME TIME EVERY DAY   • Symbicort 160-4 5 MCG/ACT inhaler INHALE 2 PUFFS BY MOUTH 2 TIMES A DAY RINSE MOUTH AFTER USE  Objective     /66 (BP Location: Left arm, Patient Position: Sitting, Cuff Size: Adult)   Pulse 75   Temp (!) 97 1 °F (36 2 °C) (Temporal)   Resp 16   Ht 5' 10\" (1 778 m)   Wt 78 7 kg (173 lb 6 4 oz)   BMI 24 88 kg/m²     Physical Exam  Vitals and nursing note reviewed  Constitutional:       Appearance: Normal appearance  HENT:      Head: Normocephalic and atraumatic  Right Ear: Tympanic membrane normal       Left Ear: Tympanic membrane normal       Nose: Nose normal       Mouth/Throat:      Mouth: Mucous membranes are moist       Pharynx: Oropharynx is clear  No oropharyngeal exudate or posterior oropharyngeal erythema  Eyes:      General: No scleral icterus  Neck:      Vascular: No carotid bruit  Cardiovascular:      Rate and Rhythm: Normal rate and regular rhythm  Heart sounds: Normal heart sounds  Pulmonary:      Effort: Pulmonary effort is normal       Breath sounds: Normal breath sounds  Abdominal:      General: Bowel sounds are normal       Palpations: Abdomen is soft  Tenderness:  There is " no abdominal tenderness  Musculoskeletal:      Cervical back: Neck supple  Right lower leg: No edema  Left lower leg: No edema  Skin:     General: Skin is warm and dry  Comments: Bilateral malar telangiectasias and erythema, right zygoma this area with a slightly raised brownish-black irregular neoplasm 0 4 x 0 5 cm   Neurological:      General: No focal deficit present  Mental Status: She is alert     Psychiatric:         Mood and Affect: Mood normal        Sánchez Pryor DO

## 2023-07-26 DIAGNOSIS — E78.5 HYPERLIPIDEMIA, UNSPECIFIED HYPERLIPIDEMIA TYPE: ICD-10-CM

## 2023-07-26 RX ORDER — ROSUVASTATIN CALCIUM 5 MG/1
TABLET, COATED ORAL
Qty: 90 TABLET | Refills: 1 | Status: SHIPPED | OUTPATIENT
Start: 2023-07-26

## 2023-08-11 DIAGNOSIS — J44.9 CHRONIC OBSTRUCTIVE PULMONARY DISEASE, UNSPECIFIED COPD TYPE (HCC): ICD-10-CM

## 2023-08-11 DIAGNOSIS — J44.9 COPD (CHRONIC OBSTRUCTIVE PULMONARY DISEASE) (HCC): ICD-10-CM

## 2023-08-11 RX ORDER — BUDESONIDE AND FORMOTEROL FUMARATE DIHYDRATE 160; 4.5 UG/1; UG/1
AEROSOL RESPIRATORY (INHALATION)
Qty: 10.2 G | Refills: 0 | Status: SHIPPED | OUTPATIENT
Start: 2023-08-11

## 2023-08-11 RX ORDER — TIOTROPIUM BROMIDE 18 UG/1
18 CAPSULE ORAL; RESPIRATORY (INHALATION) DAILY
Qty: 90 CAPSULE | Refills: 0 | Status: SHIPPED | OUTPATIENT
Start: 2023-08-11

## 2023-08-24 ENCOUNTER — OFFICE VISIT (OUTPATIENT)
Dept: PULMONOLOGY | Facility: CLINIC | Age: 68
End: 2023-08-24
Payer: MEDICARE

## 2023-08-24 VITALS
SYSTOLIC BLOOD PRESSURE: 140 MMHG | BODY MASS INDEX: 24.91 KG/M2 | HEIGHT: 70 IN | WEIGHT: 174 LBS | DIASTOLIC BLOOD PRESSURE: 70 MMHG | HEART RATE: 67 BPM | OXYGEN SATURATION: 98 %

## 2023-08-24 DIAGNOSIS — J30.9 ALLERGIC RHINITIS, UNSPECIFIED SEASONALITY, UNSPECIFIED TRIGGER: ICD-10-CM

## 2023-08-24 DIAGNOSIS — J44.9 CHRONIC OBSTRUCTIVE PULMONARY DISEASE, UNSPECIFIED COPD TYPE (HCC): Primary | ICD-10-CM

## 2023-08-24 PROCEDURE — 99213 OFFICE O/P EST LOW 20 MIN: CPT | Performed by: INTERNAL MEDICINE

## 2023-08-24 RX ORDER — BUDESONIDE AND FORMOTEROL FUMARATE DIHYDRATE 160; 4.5 UG/1; UG/1
2 AEROSOL RESPIRATORY (INHALATION) 2 TIMES DAILY
Qty: 10.2 G | Refills: 5 | Status: SHIPPED | OUTPATIENT
Start: 2023-08-24

## 2023-08-24 NOTE — PROGRESS NOTES
Office Progress Note - Pulmonary    Margarita Jimenez 77 y.o. female MRN: [id number]    Encounter: [id number]      Assessment:  • Chronic obstructive pulmonary disease. • Allergic rhinitis. Plan:   • Spiriva once a day. • Symbicort 160/4.5, 2 inhalations twice a day. • Albuterol rescue inhaler to patient's 4 times daily as needed. • Fluticasone nasal spray 2 sprays to each nostril once a day. • Regular walks and exercise to maintain stamina. • Follow-up in 6 months. Discussion:   The patient's COPD is in remission. I have maintained her on Spiriva once a day and Symbicort 160/4.5, 2 dilations twice a day. She will use the albuterol rescue inhaler 2 ablations 4 times a day as needed. Her allergic rhinitis is well treated. I have maintained her on the fluticasone nasal spray 2 sprays to each nostril once a day. She will continue with the regular walks and exercise to maintain stamina. I will see her in 6 months      Subjective: The patient is here for a follow-up visit. The she denies shortness of breath even on exertion. Denies cough, wheezing or sputum production. Denies chest pain. She is taking walks 3 times a day as she walks the dog. She is on Spiriva once a day and Symbicort twice a day. She rarely needs to use her rescue inhaler. No nocturnal symptoms. Review of systems:  A 12 point system review is done and aside from what is stated above the rest of the review of systems is negative. Family history and social history are reviewed. Medications list is reviewed. Vitals: Blood pressure 140/70, pulse 67, height 5' 10" (1.778 m), weight 78.9 kg (174 lb), SpO2 98 %, not currently breastfeeding.,     Physical Exam  Gen: Awake, alert, oriented x 3, no acute distress  HEENT: Mucous membranes moist, no oral lesions, no thrush  NECK: No accessory muscle use, JVP not elevated  Cardiac: Regular, single S1, single S2, no murmurs, no rubs, no gallops  Lungs: Decreased breath sounds. No wheezing or rhonchi. Abdomen: normoactive bowel sounds, soft nontender, nondistended, no rebound or rigidity, no guarding  Extremities: no cyanosis, no clubbing, no edema  Neuro:  Grossly nonfocal.  Skin:  No rash.     Lab Results   Component Value Date    WBC 4.98 06/05/2023    HGB 14.1 06/05/2023    HCT 42.7 06/05/2023    MCV 91 06/05/2023     06/05/2023     Lab Results   Component Value Date    SODIUM 138 06/05/2023    K 4.1 06/05/2023     (H) 06/05/2023    CO2 26 06/05/2023    BUN 12 06/05/2023    CREATININE 0.89 06/05/2023    CALCIUM 9.6 06/05/2023

## 2023-10-04 DIAGNOSIS — E03.9 HYPOTHYROIDISM, UNSPECIFIED TYPE: ICD-10-CM

## 2023-10-04 RX ORDER — LEVOTHYROXINE SODIUM 0.05 MG/1
50 TABLET ORAL
Qty: 90 TABLET | Refills: 1 | Status: SHIPPED | OUTPATIENT
Start: 2023-10-04

## 2023-10-31 DIAGNOSIS — J30.9 ALLERGIC RHINITIS, UNSPECIFIED SEASONALITY, UNSPECIFIED TRIGGER: ICD-10-CM

## 2023-10-31 DIAGNOSIS — H65.03 BILATERAL ACUTE SEROUS OTITIS MEDIA, RECURRENCE NOT SPECIFIED: ICD-10-CM

## 2023-10-31 DIAGNOSIS — H69.90 DYSFUNCTION OF EUSTACHIAN TUBE, UNSPECIFIED LATERALITY: ICD-10-CM

## 2023-10-31 RX ORDER — IPRATROPIUM BROMIDE 21 UG/1
2 SPRAY, METERED NASAL 3 TIMES DAILY
Qty: 30 ML | Refills: 5 | Status: SHIPPED | OUTPATIENT
Start: 2023-10-31

## 2023-11-03 DIAGNOSIS — J30.9 ALLERGIC RHINITIS: ICD-10-CM

## 2023-11-03 RX ORDER — MONTELUKAST SODIUM 10 MG/1
TABLET ORAL
Qty: 90 TABLET | Refills: 3 | Status: SHIPPED | OUTPATIENT
Start: 2023-11-03

## 2023-11-24 DIAGNOSIS — H65.03 BILATERAL ACUTE SEROUS OTITIS MEDIA, RECURRENCE NOT SPECIFIED: ICD-10-CM

## 2023-11-24 DIAGNOSIS — H69.90 DYSFUNCTION OF EUSTACHIAN TUBE, UNSPECIFIED LATERALITY: ICD-10-CM

## 2023-11-24 DIAGNOSIS — J30.9 ALLERGIC RHINITIS, UNSPECIFIED SEASONALITY, UNSPECIFIED TRIGGER: ICD-10-CM

## 2023-11-24 RX ORDER — IPRATROPIUM BROMIDE 21 UG/1
SPRAY, METERED NASAL
Qty: 30 ML | Refills: 2 | Status: SHIPPED | OUTPATIENT
Start: 2023-11-24

## 2023-12-18 DIAGNOSIS — H69.90 DYSFUNCTION OF EUSTACHIAN TUBE, UNSPECIFIED LATERALITY: ICD-10-CM

## 2023-12-18 DIAGNOSIS — H65.03 BILATERAL ACUTE SEROUS OTITIS MEDIA, RECURRENCE NOT SPECIFIED: ICD-10-CM

## 2023-12-18 DIAGNOSIS — J30.9 ALLERGIC RHINITIS, UNSPECIFIED SEASONALITY, UNSPECIFIED TRIGGER: ICD-10-CM

## 2023-12-18 RX ORDER — IPRATROPIUM BROMIDE 21 UG/1
SPRAY, METERED NASAL
Qty: 30 ML | Refills: 3 | Status: SHIPPED | OUTPATIENT
Start: 2023-12-18

## 2023-12-28 ENCOUNTER — TELEPHONE (OUTPATIENT)
Dept: PULMONOLOGY | Facility: CLINIC | Age: 68
End: 2023-12-28

## 2023-12-28 DIAGNOSIS — J44.9 COPD (CHRONIC OBSTRUCTIVE PULMONARY DISEASE) (HCC): ICD-10-CM

## 2023-12-28 RX ORDER — TIOTROPIUM BROMIDE 18 UG/1
18 CAPSULE ORAL; RESPIRATORY (INHALATION) DAILY
Qty: 90 CAPSULE | Refills: 5 | Status: SHIPPED | OUTPATIENT
Start: 2023-12-28

## 2024-01-02 ENCOUNTER — APPOINTMENT (OUTPATIENT)
Dept: LAB | Facility: CLINIC | Age: 69
End: 2024-01-02
Payer: MEDICARE

## 2024-01-02 DIAGNOSIS — Z80.3 FAMILY HISTORY OF BREAST CANCER IN MOTHER: ICD-10-CM

## 2024-01-02 DIAGNOSIS — J44.9 CHRONIC OBSTRUCTIVE PULMONARY DISEASE, UNSPECIFIED COPD TYPE (HCC): ICD-10-CM

## 2024-01-02 DIAGNOSIS — E03.9 ACQUIRED HYPOTHYROIDISM: ICD-10-CM

## 2024-01-02 DIAGNOSIS — M89.9 BONE DISORDER: ICD-10-CM

## 2024-01-02 DIAGNOSIS — M15.9 PRIMARY OSTEOARTHRITIS INVOLVING MULTIPLE JOINTS: ICD-10-CM

## 2024-01-02 DIAGNOSIS — H69.90 DYSFUNCTION OF EUSTACHIAN TUBE, UNSPECIFIED LATERALITY: ICD-10-CM

## 2024-01-02 DIAGNOSIS — J30.9 ALLERGIC RHINITIS, UNSPECIFIED SEASONALITY, UNSPECIFIED TRIGGER: ICD-10-CM

## 2024-01-02 DIAGNOSIS — E78.2 MIXED HYPERLIPIDEMIA: ICD-10-CM

## 2024-01-02 DIAGNOSIS — L71.9 ROSACEA: ICD-10-CM

## 2024-01-02 DIAGNOSIS — D48.5 NEOPLASM OF UNCERTAIN BEHAVIOR OF SKIN OF FACE: ICD-10-CM

## 2024-01-02 LAB
25(OH)D3 SERPL-MCNC: 30.9 NG/ML (ref 30–100)
ALBUMIN SERPL BCP-MCNC: 4 G/DL (ref 3.5–5)
ALP SERPL-CCNC: 46 U/L (ref 34–104)
ALT SERPL W P-5'-P-CCNC: 20 U/L (ref 7–52)
ANION GAP SERPL CALCULATED.3IONS-SCNC: 6 MMOL/L
AST SERPL W P-5'-P-CCNC: 21 U/L (ref 13–39)
BACTERIA UR QL AUTO: ABNORMAL /HPF
BILIRUB SERPL-MCNC: 0.42 MG/DL (ref 0.2–1)
BILIRUB UR QL STRIP: NEGATIVE
BUN SERPL-MCNC: 17 MG/DL (ref 5–25)
CALCIUM SERPL-MCNC: 9.4 MG/DL (ref 8.4–10.2)
CHLORIDE SERPL-SCNC: 107 MMOL/L (ref 96–108)
CHOLEST SERPL-MCNC: 206 MG/DL
CLARITY UR: CLEAR
CO2 SERPL-SCNC: 26 MMOL/L (ref 21–32)
COLOR UR: YELLOW
CREAT SERPL-MCNC: 0.72 MG/DL (ref 0.6–1.3)
ERYTHROCYTE [DISTWIDTH] IN BLOOD BY AUTOMATED COUNT: 13.2 % (ref 11.6–15.1)
GFR SERPL CREATININE-BSD FRML MDRD: 85 ML/MIN/1.73SQ M
GLUCOSE P FAST SERPL-MCNC: 89 MG/DL (ref 65–99)
GLUCOSE UR STRIP-MCNC: NEGATIVE MG/DL
HCT VFR BLD AUTO: 41.6 % (ref 34.8–46.1)
HDLC SERPL-MCNC: 68 MG/DL
HGB BLD-MCNC: 13.6 G/DL (ref 11.5–15.4)
HGB UR QL STRIP.AUTO: NEGATIVE
KETONES UR STRIP-MCNC: NEGATIVE MG/DL
LDLC SERPL CALC-MCNC: 105 MG/DL (ref 0–100)
LEUKOCYTE ESTERASE UR QL STRIP: ABNORMAL
MCH RBC QN AUTO: 30.2 PG (ref 26.8–34.3)
MCHC RBC AUTO-ENTMCNC: 32.7 G/DL (ref 31.4–37.4)
MCV RBC AUTO: 92 FL (ref 82–98)
MUCOUS THREADS UR QL AUTO: ABNORMAL
NITRITE UR QL STRIP: NEGATIVE
NON-SQ EPI CELLS URNS QL MICRO: ABNORMAL /HPF
PH UR STRIP.AUTO: 6 [PH]
PLATELET # BLD AUTO: 265 THOUSANDS/UL (ref 149–390)
PMV BLD AUTO: 10.3 FL (ref 8.9–12.7)
POTASSIUM SERPL-SCNC: 4.2 MMOL/L (ref 3.5–5.3)
PROT SERPL-MCNC: 7 G/DL (ref 6.4–8.4)
PROT UR STRIP-MCNC: ABNORMAL MG/DL
RBC # BLD AUTO: 4.5 MILLION/UL (ref 3.81–5.12)
RBC #/AREA URNS AUTO: ABNORMAL /HPF
SODIUM SERPL-SCNC: 139 MMOL/L (ref 135–147)
SP GR UR STRIP.AUTO: 1.02 (ref 1–1.03)
TRIGL SERPL-MCNC: 166 MG/DL
TSH SERPL DL<=0.05 MIU/L-ACNC: 3.72 UIU/ML (ref 0.45–4.5)
UROBILINOGEN UR STRIP-ACNC: <2 MG/DL
WBC # BLD AUTO: 5.66 THOUSAND/UL (ref 4.31–10.16)
WBC #/AREA URNS AUTO: ABNORMAL /HPF

## 2024-01-02 PROCEDURE — 84443 ASSAY THYROID STIM HORMONE: CPT

## 2024-01-02 PROCEDURE — 36415 COLL VENOUS BLD VENIPUNCTURE: CPT

## 2024-01-02 PROCEDURE — 82306 VITAMIN D 25 HYDROXY: CPT

## 2024-01-02 PROCEDURE — 80061 LIPID PANEL: CPT

## 2024-01-02 PROCEDURE — 81001 URINALYSIS AUTO W/SCOPE: CPT

## 2024-01-02 PROCEDURE — 85027 COMPLETE CBC AUTOMATED: CPT

## 2024-01-02 PROCEDURE — 80053 COMPREHEN METABOLIC PANEL: CPT

## 2024-01-04 ENCOUNTER — OFFICE VISIT (OUTPATIENT)
Dept: FAMILY MEDICINE CLINIC | Facility: CLINIC | Age: 69
End: 2024-01-04
Payer: MEDICARE

## 2024-01-04 VITALS
HEIGHT: 70 IN | WEIGHT: 175 LBS | RESPIRATION RATE: 18 BRPM | OXYGEN SATURATION: 98 % | HEART RATE: 76 BPM | BODY MASS INDEX: 25.05 KG/M2 | DIASTOLIC BLOOD PRESSURE: 82 MMHG | SYSTOLIC BLOOD PRESSURE: 130 MMHG

## 2024-01-04 DIAGNOSIS — Z00.00 HEALTHCARE MAINTENANCE: ICD-10-CM

## 2024-01-04 DIAGNOSIS — Z80.3 FAMILY HISTORY OF BREAST CANCER IN MOTHER: ICD-10-CM

## 2024-01-04 DIAGNOSIS — J44.9 CHRONIC OBSTRUCTIVE PULMONARY DISEASE, UNSPECIFIED COPD TYPE (HCC): ICD-10-CM

## 2024-01-04 DIAGNOSIS — D48.5 NEOPLASM OF UNCERTAIN BEHAVIOR OF SKIN OF FACE: ICD-10-CM

## 2024-01-04 DIAGNOSIS — H69.90 DYSFUNCTION OF EUSTACHIAN TUBE, UNSPECIFIED LATERALITY: ICD-10-CM

## 2024-01-04 DIAGNOSIS — L71.9 ROSACEA: ICD-10-CM

## 2024-01-04 DIAGNOSIS — Z12.11 SCREENING FOR COLON CANCER: ICD-10-CM

## 2024-01-04 DIAGNOSIS — Z78.0 POSTMENOPAUSAL ESTROGEN DEFICIENCY: ICD-10-CM

## 2024-01-04 DIAGNOSIS — E78.2 MIXED HYPERLIPIDEMIA: ICD-10-CM

## 2024-01-04 DIAGNOSIS — M15.9 PRIMARY OSTEOARTHRITIS INVOLVING MULTIPLE JOINTS: ICD-10-CM

## 2024-01-04 DIAGNOSIS — E03.9 ACQUIRED HYPOTHYROIDISM: Primary | ICD-10-CM

## 2024-01-04 DIAGNOSIS — J30.9 ALLERGIC RHINITIS, UNSPECIFIED SEASONALITY, UNSPECIFIED TRIGGER: ICD-10-CM

## 2024-01-04 DIAGNOSIS — Z12.31 BREAST CANCER SCREENING BY MAMMOGRAM: ICD-10-CM

## 2024-01-04 PROCEDURE — G0439 PPPS, SUBSEQ VISIT: HCPCS | Performed by: FAMILY MEDICINE

## 2024-01-04 PROCEDURE — 99214 OFFICE O/P EST MOD 30 MIN: CPT | Performed by: FAMILY MEDICINE

## 2024-01-04 NOTE — PROGRESS NOTES
Assessment and Plan:     1.  Hypothyroidism, stable continue present therapy  2.  COPD, stable follows with pulm medicine  3.  Pure allergic rhinitis/eustachian tube dysfunction, stable continue present therapy  4.  DJD currently asymptomatic  5.  Family history of breast cancer mother/breast cancer screening, mammography was reordered.  Risk of breast cancer discussed  6.  Healthcare maintenance Medicare AWV completed  7.  Hyperlipidemia, stable on Crestor  8.  Screening colon cancer refer back for colonoscopy her last colonoscopy was 1/14  9.  Rosacea/skin neoplasm of face was not contacted by [facility] dermatology will refer to Dr. Ann Gautam  10.  Return in 6 months for office visit and blood work sooner if needed        Problem List Items Addressed This Visit        Endocrine    Acquired hypothyroidism - Primary     Able continue levothyroxine 50 mcg daily         Relevant Orders    CBC    Comprehensive metabolic panel    Lipid Panel with Direct LDL reflex    TSH, 3rd generation with Free T4 reflex    UA (URINE) with reflex to Scope       Respiratory    Allergic rhinitis     Continue Singulair/Claritin-D/Flonase/Atrovent.  Offered allergy consultation patient declined         Relevant Orders    CBC    Comprehensive metabolic panel    Lipid Panel with Direct LDL reflex    TSH, 3rd generation with Free T4 reflex    UA (URINE) with reflex to Scope    Chronic obstructive pulmonary disease (HCC)     Stable follows with pulmonary medicine         Relevant Orders    CBC    Comprehensive metabolic panel    Lipid Panel with Direct LDL reflex    TSH, 3rd generation with Free T4 reflex    UA (URINE) with reflex to Scope       Nervous and Auditory    Eustachian tube dysfunction     As above         Relevant Orders    CBC    Comprehensive metabolic panel    Lipid Panel with Direct LDL reflex    TSH, 3rd generation with Free T4 reflex    UA (URINE) with reflex to Scope       Musculoskeletal and Integument    Osteoarthritis      Asymptomatic at present         Relevant Orders    CBC    Comprehensive metabolic panel    Lipid Panel with Direct LDL reflex    TSH, 3rd generation with Free T4 reflex    UA (URINE) with reflex to Scope    Rosacea     Refer to dermatology, Dr. Ann Gautam, patient was never contacted by [facility] dermatology         Relevant Orders    CBC    Comprehensive metabolic panel    Lipid Panel with Direct LDL reflex    TSH, 3rd generation with Free T4 reflex    UA (URINE) with reflex to Scope    Ambulatory Referral to Dermatology       Other    Mixed hyperlipidemia     Stable, continue Crestor 5 mg daily         Relevant Orders    CBC    Comprehensive metabolic panel    Lipid Panel with Direct LDL reflex    TSH, 3rd generation with Free T4 reflex    UA (URINE) with reflex to Scope    Healthcare maintenance     Medicare AWV completed, DEXA scan was ordered         Relevant Orders    DXA bone density spine hip and pelvis    CBC    Comprehensive metabolic panel    Lipid Panel with Direct LDL reflex    TSH, 3rd generation with Free T4 reflex    UA (URINE) with reflex to Scope    Family history of breast cancer in mother    Relevant Orders    Mammo screening bilateral w 3d & cad    CBC    Comprehensive metabolic panel    Lipid Panel with Direct LDL reflex    TSH, 3rd generation with Free T4 reflex    UA (URINE) with reflex to Scope    Screening for colon cancer     Refer back to GI         Relevant Orders    Ambulatory Referral to Gastroenterology    CBC    Comprehensive metabolic panel    Lipid Panel with Direct LDL reflex    TSH, 3rd generation with Free T4 reflex    UA (URINE) with reflex to Scope   Other Visit Diagnoses     Breast cancer screening by mammogram        Relevant Orders    Mammo screening bilateral w 3d & cad    CBC    Comprehensive metabolic panel    Lipid Panel with Direct LDL reflex    TSH, 3rd generation with Free T4 reflex    UA (URINE) with reflex to Scope    Postmenopausal estrogen deficiency         Relevant Orders    DXA bone density spine hip and pelvis    CBC    Comprehensive metabolic panel    Lipid Panel with Direct LDL reflex    TSH, 3rd generation with Free T4 reflex    UA (URINE) with reflex to Scope    Neoplasm of uncertain behavior of skin of face        Relevant Orders    Ambulatory Referral to Dermatology          Depression Screening and Follow-up Plan: Patient was screened for depression during today's encounter. They screened negative with a PHQ-2 score of 0.      Preventive health issues were discussed with patient, and age appropriate screening tests were ordered as noted in patient's After Visit Summary.  Personalized health advice and appropriate referrals for health education or preventive services given if needed, as noted in patient's After Visit Summary.     History of Present Illness:     Patient presents for a Medicare Wellness Visit    PATIENT was referred to dermatology back in June for skin neoplasm on her right temple and rosacea.  She was nontoxic by the dermatology department she called them and still could not get an appointment.  She would like another dermatologist will refer to Dr. Ann Gautam.  Blood work was reviewed with the patient.       Patient Care Team:  Sánchez Pryor DO as PCP - General (Family Medicine)  Mary Jane Salmeron MD     Review of Systems:     Review of Systems   Constitutional: Negative.    HENT:          Still with occasional head congestion   Eyes: Negative.    Respiratory: Negative.     Gastrointestinal: Negative.    Endocrine: Negative.    Genitourinary: Negative.    Musculoskeletal: Negative.    Skin:         HPI   Allergic/Immunologic: Positive for environmental allergies.   Neurological: Negative.    Hematological: Negative.    Psychiatric/Behavioral: Negative.          Problem List:     Patient Active Problem List   Diagnosis   • Allergic rhinitis   • Chronic obstructive pulmonary disease (HCC)   • Mixed hyperlipidemia   • Acquired  hypothyroidism   • Osteoarthritis   • Eustachian tube dysfunction   • Rosacea   • Healthcare maintenance   • Family history of breast cancer in mother   • Screening for colon cancer      Past Medical and Surgical History:     Past Medical History:   Diagnosis Date   • Cataract    • COPD (chronic obstructive pulmonary disease) (HCC)    • History of total hysterectomy    • Hyperlipidemia    • Hypothyroid    • Osteoarthritis      Past Surgical History:   Procedure Laterality Date   • BLEPHAROPLASTY Bilateral     upper lid - Dr. Wilkerson   • CATARACT EXTRACTION, BILATERAL     • COLONOSCOPY     • GANGLION CYST EXCISION Right 3/14/2023    Procedure: right long finger ganglion cyst 3 cm x 1 cm x 1 cm excision;  Surgeon: Edmar Gan MD;  Location: [facility];  Service: Orthopedics   • HYSTERECTOMY     • REVISION COLOSTOMY     • SHOULDER SURGERY      S/P dislocated shoulder      Family History:     Family History   Problem Relation Age of Onset   • Breast cancer Mother    • Hypertension Mother    • Alcohol abuse Father    • Drug abuse Sister       Social History:     Social History     Socioeconomic History   • Marital status: Single     Spouse name: None   • Number of children: None   • Years of education: None   • Highest education level: None   Occupational History   • 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   Tobacco Use   • Smoking status: Former     Current packs/day: 0.00     Average packs/day: 3.0 packs/day for 14.0 years (42.0 ttl pk-yrs)     Types: Cigarettes     Start date:      Quit date:      Years since quittin.0   • Smokeless tobacco: Never   Vaping Use   • Vaping status: Never Used   Substance and Sexual Activity   • Alcohol use: Not Currently     Comment: per [employer]-stopped drinking alcohol   • Drug use: No     Comment: per [employer]-recovering from drug addiction   • Sexual activity: Not Currently   Other Topics Concern   • None   Social History Narrative    Daily coffee  consumption 4 cups per day     Social Determinants of Health     Financial Resource Strain: Low Risk  (1/4/2024)    Overall Financial Resource Strain (CARDIA)    • Difficulty of Paying Living Expenses: Not hard at all   Food Insecurity: Not on file   Transportation Needs: No Transportation Needs (1/4/2024)    PRAPARE - Transportation    • Lack of Transportation (Medical): No    • Lack of Transportation (Non-Medical): No   Physical Activity: Not on file   Stress: Not on file   Social Connections: Not on file   Intimate Partner Violence: Not on file   Housing Stability: Not on file      Medications and Allergies:     Current Outpatient Medications   Medication Sig Dispense Refill   • Ascorbic Acid (vitamin C) 1000 MG tablet Take 1,000 mg by mouth daily     • celecoxib (CeleBREX) 200 mg capsule TAKE 1 CAPSULE DAILY WITH FOOD AS NEEDED FOR ARTHRITIS PAIN 30 capsule 5   • fluticasone (FLONASE) 50 mcg/act nasal spray 2 sprays into each nostril daily     • ipratropium (ATROVENT) 0.03 % nasal spray SPRAY 2 SPRAYS INTO EACH NOSTRIL 3 TIMES A DAY 30 mL 3   • levothyroxine 50 mcg tablet TAKE 1 TABLET (50 MCG TOTAL) BY MOUTH DAILY IN THE EARLY MORNING 90 tablet 1   • loratadine-pseudoephedrine (CLARITIN-D 24-HOUR)  mg per 24 hr tablet Take 1 tablet by mouth daily     • montelukast (SINGULAIR) 10 mg tablet TAKE 1 TABLET BY MOUTH EVERY DAY 90 tablet 3   • Multiple Vitamins-Minerals (multivitamin with minerals) tablet Take 1 tablet by mouth daily     • Omega-3 Fatty Acids (FISH OIL) 1,000 mg Take 3,000 mg by mouth daily     • RESTASIS 0.05 % ophthalmic emulsion      • rosuvastatin (CRESTOR) 5 mg tablet TAKE 1 TABLET BY MOUTH EVERY DAY 90 tablet 1   • Symbicort 160-4.5 MCG/ACT inhaler Inhale 2 puffs 2 (two) times a day Rinse mouth after use. 10.2 g 5   • tiotropium (Spiriva HandiHaler) 18 mcg inhalation capsule Place 1 capsule (18 mcg total) into inhaler and inhale daily 90 capsule 5     No current facility-administered  medications for this visit.     Allergies   Allergen Reactions   • Alcohol - Food Allergy    • Other      narcotics     • Mobic [Meloxicam] GI Intolerance      Immunizations:     Immunization History   Administered Date(s) Administered   • COVID-19 MODERNA VACC 0.5 ML IM 03/23/2021, 04/22/2021   • COVID-19 Pfizer vac (Dylan-sucrose, gray cap) 12 yr+ IM 06/23/2022   • INFLUENZA 09/21/2017   • Influenza Quadrivalent Preservative Free ID 09/21/2017   • Influenza, high dose seasonal 0.7 mL 09/16/2020, 12/01/2022   • Influenza, injectable, quadrivalent, preservative free 0.5 mL 01/29/2020   • Pneumococcal Conjugate Vaccine 20-valent (Pcv20), Polysace 04/25/2022   • Pneumococcal Polysaccharide PPV23 09/24/2019   • Tuberculin Skin Test-PPD Intradermal 04/17/2013      Health Maintenance:         Topic Date Due   • Breast Cancer Screening: Mammogram  Never done   • Colorectal Cancer Screening  01/31/2024   • Hepatitis C Screening  Completed         Topic Date Due   • Influenza Vaccine (1) 09/01/2023   • COVID-19 Vaccine (4 - 2023-24 season) 09/01/2023      Medicare Screening Tests and Risk Assessments:         Health Risk Assessment:   Patient rates overall health as good. Patient feels that their physical health rating is same. Patient is satisfied with their life. Eyesight was rated as same. Hearing was rated as same. Patient feels that their emotional and mental health rating is same. Patients states they are sometimes angry. Patient states they are sometimes unusually tired/fatigued. Pain experienced in the last 7 days has been none. Patient states that she has experienced no weight loss or gain in last 6 months.     Depression Screening:   PHQ-2 Score: 0      Fall Risk Screening:   In the past year, patient has experienced: no history of falling in past year      Urinary Incontinence Screening:   Patient has not leaked urine accidently in the last six months.     Home Safety:  Patient does not have trouble with stairs  inside or outside of their home. Patient has working smoke alarms and has working carbon monoxide detector. Home safety hazards include: none.     Nutrition:   Current diet is Regular.     Medications:   Patient is currently taking over-the-counter supplements. OTC medications include: see medication list. Patient is able to manage medications.     Activities of Daily Living (ADLs)/Instrumental Activities of Daily Living (IADLs):   Walk and transfer into and out of bed and chair?: Yes  Dress and groom yourself?: Yes    Bathe or shower yourself?: Yes    Feed yourself? Yes  Do your laundry/housekeeping?: Yes  Manage your money, pay your bills and track your expenses?: Yes  Make your own meals?: Yes    Do your own shopping?: Yes    Previous Hospitalizations:   Any hospitalizations or ED visits within the last 12 months?: No      Advance Care Planning:   Living will: Yes    Durable POA for healthcare: Yes    Advanced directive: Yes    Advanced directive counseling given: No    Five wishes given: No    Patient declined ACP directive: No    End of Life Decisions reviewed with patient: No    Provider agrees with end of life decisions: Yes      Cognitive Screening:   Provider or family/friend/caregiver concerned regarding cognition?: No    PREVENTIVE SCREENINGS      Cardiovascular Screening:    General: Screening Not Indicated and History Lipid Disorder      Diabetes Screening:     General: Screening Current      Colorectal Cancer Screening:     General: Screening Current    Due for: Colonoscopy - Low Risk      Breast Cancer Screening:     General: Risks and Benefits Discussed    Due for: Mammogram        Cervical Cancer Screening:    General: Screening Not Indicated      Osteoporosis Screening:    General: Risks and Benefits Discussed    Due for: DXA Axial      Abdominal Aortic Aneurysm (AAA) Screening:        General: Screening Not Indicated      Lung Cancer Screening:     General: Screening Not Indicated      Hepatitis C  "Screening:    General: Screening Current    Screening, Brief Intervention, and Referral to Treatment (SBIRT)    Screening  Typical number of drinks in a day: 0  Typical number of drinks in a week: 0  Interpretation: Low risk drinking behavior.    Single Item Drug Screening:  How often have you used an illegal drug (including marijuana) or a prescription medication for non-medical reasons in the past year? never    Single Item Drug Screen Score: 0  Interpretation: Negative screen for possible drug use disorder    No results found.     Physical Exam:     /82 (BP Location: Right arm, Patient Position: Sitting, Cuff Size: Large)   Pulse 76   Resp 18   Ht 5' 10\" (1.778 m)   Wt 79.4 kg (175 lb)   SpO2 98%   BMI 25.11 kg/m²     Physical Exam  Vitals and nursing note reviewed.   Constitutional:       Appearance: Normal appearance.   HENT:      Head: Normocephalic and atraumatic.      Right Ear: Tympanic membrane normal.      Left Ear: Tympanic membrane normal.      Nose:      Comments: Mildly allergic turbinates     Mouth/Throat:      Mouth: Mucous membranes are moist.      Pharynx: Oropharynx is clear. No oropharyngeal exudate or posterior oropharyngeal erythema.   Eyes:      General: No scleral icterus.        Right eye: No discharge.         Left eye: No discharge.      Conjunctiva/sclera: Conjunctivae normal.   Neck:      Vascular: No carotid bruit.   Cardiovascular:      Rate and Rhythm: Normal rate and regular rhythm.      Heart sounds: Normal heart sounds.   Pulmonary:      Effort: Pulmonary effort is normal.      Breath sounds: Normal breath sounds.   Abdominal:      General: Bowel sounds are normal.      Palpations: Abdomen is soft.      Tenderness: There is no abdominal tenderness.   Musculoskeletal:      Cervical back: Neck supple.      Right lower leg: No edema.      Left lower leg: No edema.   Lymphadenopathy:      Cervical: No cervical adenopathy.   Skin:     General: Skin is warm and dry.      " Comments: Right temple with mildly raised irregular wartlike neoplasm 0.3 x 0.3 cm   Neurological:      General: No focal deficit present.      Mental Status: She is alert.   Psychiatric:         Mood and Affect: Mood normal.          Sánchez Pryor DO

## 2024-01-04 NOTE — PATIENT INSTRUCTIONS
Complete mammogram and DEXA scan  Complete colonoscopy  Follow with Dr. Ann Gautam, dermatologist for lesion on face and rosacea  Return in 6 months for office visit and blood work sooner if needed  Follows all specialist per instructions        Medicare Preventive Visit Patient Instructions  Thank you for completing your Welcome to Medicare Visit or Medicare Annual Wellness Visit today. Your next wellness visit will be due in one year (1/4/2025).  The screening/preventive services that you may require over the next 5-10 years are detailed below. Some tests may not apply to you based off risk factors and/or age. Screening tests ordered at today's visit but not completed yet may show as past due. Also, please note that scanned in results may not display below.  Preventive Screenings:  Service Recommendations Previous Testing/Comments   Colorectal Cancer Screening  * Colonoscopy    * Fecal Occult Blood Test (FOBT)/Fecal Immunochemical Test (FIT)  * Fecal DNA/Cologuard Test  * Flexible Sigmoidoscopy Age: 45-75 years old   Colonoscopy: every 10 years (may be performed more frequently if at higher risk)  OR  FOBT/FIT: every 1 year  OR  Cologuard: every 3 years  OR  Sigmoidoscopy: every 5 years  Screening may be recommended earlier than age 45 if at higher risk for colorectal cancer. Also, an individualized decision between you and your healthcare provider will decide whether screening between the ages of 76-85 would be appropriate. Colonoscopy: 02/03/2014  FOBT/FIT: Not on file  Cologuard: Not on file  Sigmoidoscopy: Not on file    Screening Current     Breast Cancer Screening Age: 40+ years old  Frequency: every 1-2 years  Not required if history of left and right mastectomy Mammogram: Not on file        Cervical Cancer Screening Between the ages of 21-29, pap smear recommended once every 3 years.   Between the ages of 30-65, can perform pap smear with HPV co-testing every 5 years.   Recommendations may differ for women  with a history of total hysterectomy, cervical cancer, or abnormal pap smears in past. Pap Smear: Not on file    Screening Not Indicated   Hepatitis C Screening Once for adults born between 1945 and 1965  More frequently in patients at high risk for Hepatitis C Hep C Antibody: 11/30/2022    Screening Current   Diabetes Screening 1-2 times per year if you're at risk for diabetes or have pre-diabetes Fasting glucose: 89 mg/dL (1/2/2024)  A1C: No results in last 5 years (No results in last 5 years)  Screening Current   Cholesterol Screening Once every 5 years if you don't have a lipid disorder. May order more often based on risk factors. Lipid panel: 01/02/2024    Screening Not Indicated  History Lipid Disorder     Other Preventive Screenings Covered by Medicare:  Abdominal Aortic Aneurysm (AAA) Screening: covered once if your at risk. You're considered to be at risk if you have a family history of AAA.  Lung Cancer Screening: covers low dose CT scan once per year if you meet all of the following conditions: (1) Age 55-77; (2) No signs or symptoms of lung cancer; (3) Current smoker or have quit smoking within the last 15 years; (4) You have a tobacco smoking history of at least 20 pack years (packs per day multiplied by number of years you smoked); (5) You get a written order from a healthcare provider.  Glaucoma Screening: covered annually if you're considered high risk: (1) You have diabetes OR (2) Family history of glaucoma OR (3)  aged 50 and older OR (4)  American aged 65 and older  Osteoporosis Screening: covered every 2 years if you meet one of the following conditions: (1) You're estrogen deficient and at risk for osteoporosis based off medical history and other findings; (2) Have a vertebral abnormality; (3) On glucocorticoid therapy for more than 3 months; (4) Have primary hyperparathyroidism; (5) On osteoporosis medications and need to assess response to drug therapy.   Last bone  density test (DXA Scan): 09/15/2019.  HIV Screening: covered annually if you're between the age of 15-65. Also covered annually if you are younger than 15 and older than 65 with risk factors for HIV infection. For pregnant patients, it is covered up to 3 times per pregnancy.    Immunizations:  Immunization Recommendations   Influenza Vaccine Annual influenza vaccination during flu season is recommended for all persons aged >= 6 months who do not have contraindications   Pneumococcal Vaccine   * Pneumococcal conjugate vaccine = PCV13 (Prevnar 13), PCV15 (Vaxneuvance), PCV20 (Prevnar 20)  * Pneumococcal polysaccharide vaccine = PPSV23 (Pneumovax) Adults 19-65 yo with certain risk factors or if 65+ yo  If never received any pneumonia vaccine: recommend Prevnar 20 (PCV20)  Give PCV20 if previously received 1 dose of PCV13 or PPSV23   Hepatitis B Vaccine 3 dose series if at intermediate or high risk (ex: diabetes, end stage renal disease, liver disease)   Respiratory syncytial virus (RSV) Vaccine - COVERED BY MEDICARE PART D  * RSVPreF3 (Arexvy) CDC recommends that adults 60 years of age and older may receive a single dose of RSV vaccine using shared clinical decision-making (SCDM)   Tetanus (Td) Vaccine - COST NOT COVERED BY MEDICARE PART B Following completion of primary series, a booster dose should be given every 10 years to maintain immunity against tetanus. Td may also be given as tetanus wound prophylaxis.   Tdap Vaccine - COST NOT COVERED BY MEDICARE PART B Recommended at least once for all adults. For pregnant patients, recommended with each pregnancy.   Shingles Vaccine (Shingrix) - COST NOT COVERED BY MEDICARE PART B  2 shot series recommended in those 19 years and older who have or will have weakened immune systems or those 50 years and older     Health Maintenance Due:      Topic Date Due    Breast Cancer Screening: Mammogram  Never done    Colorectal Cancer Screening  01/31/2024    Hepatitis C Screening   Completed     Immunizations Due:      Topic Date Due    Influenza Vaccine (1) 09/01/2023    COVID-19 Vaccine (4 - 2023-24 season) 09/01/2023     Advance Directives   What are advance directives?  Advance directives are legal documents that state your wishes and plans for medical care. These plans are made ahead of time in case you lose your ability to make decisions for yourself. Advance directives can apply to any medical decision, such as the treatments you want, and if you want to donate organs.   What are the types of advance directives?  There are many types of advance directives, and each state has rules about how to use them. You may choose a combination of any of the following:  Living will:  This is a written record of the treatment you want. You can also choose which treatments you do not want, which to limit, and which to stop at a certain time. This includes surgery, medicine, IV fluid, and tube feedings.   Durable power of  for healthcare (DPAHC):  This is a written record that states who you want to make healthcare choices for you when you are unable to make them for yourself. This person, called a proxy, is usually a family member or a friend. You may choose more than 1 proxy.  Do not resuscitate (DNR) order:  A DNR order is used in case your heart stops beating or you stop breathing. It is a request not to have certain forms of treatment, such as CPR. A DNR order may be included in other types of advance directives.  Medical directive:  This covers the care that you want if you are in a coma, near death, or unable to make decisions for yourself. You can list the treatments you want for each condition. Treatment may include pain medicine, surgery, blood transfusions, dialysis, IV or tube feedings, and a ventilator (breathing machine).  Values history:  This document has questions about your views, beliefs, and how you feel and think about life. This information can help others choose the care  that you would choose.  Why are advance directives important?  An advance directive helps you control your care. Although spoken wishes may be used, it is better to have your wishes written down. Spoken wishes can be misunderstood, or not followed. Treatments may be given even if you do not want them. An advance directive may make it easier for your family to make difficult choices about your care.   Weight Management   Why it is important to manage your weight:  Being overweight increases your risk of health conditions such as heart disease, high blood pressure, type 2 diabetes, and certain types of cancer. It can also increase your risk for osteoarthritis, sleep apnea, and other respiratory problems. Aim for a slow, steady weight loss. Even a small amount of weight loss can lower your risk of health problems.  How to lose weight safely:  A safe and healthy way to lose weight is to eat fewer calories and get regular exercise. You can lose up about 1 pound a week by decreasing the number of calories you eat by 500 calories each day.   Healthy meal plan for weight management:  A healthy meal plan includes a variety of foods, contains fewer calories, and helps you stay healthy. A healthy meal plan includes the following:  Eat whole-grain foods more often.  A healthy meal plan should contain fiber. Fiber is the part of grains, fruits, and vegetables that is not broken down by your body. Whole-grain foods are healthy and provide extra fiber in your diet. Some examples of whole-grain foods are whole-wheat breads and pastas, oatmeal, brown rice, and bulgur.  Eat a variety of vegetables every day.  Include dark, leafy greens such as spinach, kale, star greens, and mustard greens. Eat yellow and orange vegetables such as carrots, sweet potatoes, and winter squash.   Eat a variety of fruits every day.  Choose fresh or canned fruit (canned in its own juice or light syrup) instead of juice. Fruit juice has very little or no  fiber.  Eat low-fat dairy foods.  Drink fat-free (skim) milk or 1% milk. Eat fat-free yogurt and low-fat cottage cheese. Try low-fat cheeses such as mozzarella and other reduced-fat cheeses.  Choose meat and other protein foods that are low in fat.  Choose beans or other legumes such as split peas or lentils. Choose fish, skinless poultry (chicken or turkey), or lean cuts of red meat (beef or pork). Before you cook meat or poultry, cut off any visible fat.   Use less fat and oil.  Try baking foods instead of frying them. Add less fat, such as margarine, sour cream, regular salad dressing and mayonnaise to foods. Eat fewer high-fat foods. Some examples of high-fat foods include french fries, doughnuts, ice cream, and cakes.  Eat fewer sweets.  Limit foods and drinks that are high in sugar. This includes candy, cookies, regular soda, and sweetened drinks.  Exercise:  Exercise at least 30 minutes per day on most days of the week. Some examples of exercise include walking, biking, dancing, and swimming. You can also fit in more physical activity by taking the stairs instead of the elevator or parking farther away from stores. Ask your healthcare provider about the best exercise plan for you.      © Copyright [employer] 2018 Information is for End User's use only and may not be sold, redistributed or otherwise used for commercial purposes. All illustrations and images included in CareNotes® are the copyrighted property of [employer] Inc[employer] or [employer]

## 2024-01-13 DIAGNOSIS — J30.9 ALLERGIC RHINITIS, UNSPECIFIED SEASONALITY, UNSPECIFIED TRIGGER: ICD-10-CM

## 2024-01-13 DIAGNOSIS — H69.90 DYSFUNCTION OF EUSTACHIAN TUBE, UNSPECIFIED LATERALITY: ICD-10-CM

## 2024-01-13 DIAGNOSIS — H65.03 BILATERAL ACUTE SEROUS OTITIS MEDIA, RECURRENCE NOT SPECIFIED: ICD-10-CM

## 2024-01-15 RX ORDER — IPRATROPIUM BROMIDE 21 UG/1
SPRAY, METERED NASAL
Qty: 90 ML | Refills: 2 | Status: SHIPPED | OUTPATIENT
Start: 2024-01-15

## 2024-02-02 DIAGNOSIS — E78.5 HYPERLIPIDEMIA, UNSPECIFIED HYPERLIPIDEMIA TYPE: ICD-10-CM

## 2024-02-02 RX ORDER — ROSUVASTATIN CALCIUM 5 MG/1
TABLET, COATED ORAL
Qty: 90 TABLET | Refills: 1 | Status: SHIPPED | OUTPATIENT
Start: 2024-02-02

## 2024-02-20 ENCOUNTER — PREP FOR PROCEDURE (OUTPATIENT)
Age: 69
End: 2024-02-20

## 2024-02-20 ENCOUNTER — TELEPHONE (OUTPATIENT)
Age: 69
End: 2024-02-20

## 2024-02-20 DIAGNOSIS — Z12.11 SCREENING FOR COLON CANCER: Primary | ICD-10-CM

## 2024-02-20 NOTE — TELEPHONE ENCOUNTER
Scheduled date of colonoscopy (as of today): 5/22/24    Physician performing colonoscopy: Dr. Osorio    Location of colonoscopy: [facility]    Bowel prep reviewed with patient: Miralax    Prep Instructions sent via [facility]

## 2024-02-21 ENCOUNTER — OFFICE VISIT (OUTPATIENT)
Dept: FAMILY MEDICINE CLINIC | Facility: CLINIC | Age: 69
End: 2024-02-21
Payer: MEDICARE

## 2024-02-21 VITALS
SYSTOLIC BLOOD PRESSURE: 130 MMHG | HEART RATE: 77 BPM | WEIGHT: 177 LBS | BODY MASS INDEX: 25.34 KG/M2 | DIASTOLIC BLOOD PRESSURE: 70 MMHG | HEIGHT: 70 IN | OXYGEN SATURATION: 98 %

## 2024-02-21 DIAGNOSIS — R32 URINARY INCONTINENCE IN FEMALE: ICD-10-CM

## 2024-02-21 DIAGNOSIS — R30.0 DYSURIA: Primary | ICD-10-CM

## 2024-02-21 DIAGNOSIS — N89.8 VAGINAL IRRITATION: ICD-10-CM

## 2024-02-21 DIAGNOSIS — J44.9 CHRONIC OBSTRUCTIVE PULMONARY DISEASE, UNSPECIFIED COPD TYPE (HCC): ICD-10-CM

## 2024-02-21 DIAGNOSIS — N89.8 VAGINAL DRYNESS: ICD-10-CM

## 2024-02-21 DIAGNOSIS — N32.81 OAB (OVERACTIVE BLADDER): ICD-10-CM

## 2024-02-21 DIAGNOSIS — R35.0 URINARY FREQUENCY: ICD-10-CM

## 2024-02-21 DIAGNOSIS — R39.15 URINARY URGENCY: ICD-10-CM

## 2024-02-21 PROBLEM — Z12.11 SCREENING FOR COLON CANCER: Status: RESOLVED | Noted: 2022-04-25 | Resolved: 2024-02-21

## 2024-02-21 PROBLEM — Z00.00 HEALTHCARE MAINTENANCE: Status: RESOLVED | Noted: 2019-09-24 | Resolved: 2024-02-21

## 2024-02-21 LAB
SL AMB  POCT GLUCOSE, UA: NEGATIVE
SL AMB LEUKOCYTE ESTERASE,UA: NEGATIVE
SL AMB POCT BILIRUBIN,UA: NEGATIVE
SL AMB POCT BLOOD,UA: NEGATIVE
SL AMB POCT CLARITY,UA: CLEAR
SL AMB POCT COLOR,UA: YELLOW
SL AMB POCT KETONES,UA: NEGATIVE
SL AMB POCT NITRITE,UA: NEGATIVE
SL AMB POCT PH,UA: 7
SL AMB POCT SPECIFIC GRAVITY,UA: 1.01
SL AMB POCT URINE PROTEIN: NEGATIVE
SL AMB POCT UROBILINOGEN: 0.2

## 2024-02-21 PROCEDURE — 87147 CULTURE TYPE IMMUNOLOGIC: CPT | Performed by: FAMILY MEDICINE

## 2024-02-21 PROCEDURE — 87077 CULTURE AEROBIC IDENTIFY: CPT | Performed by: FAMILY MEDICINE

## 2024-02-21 PROCEDURE — 81002 URINALYSIS NONAUTO W/O SCOPE: CPT | Performed by: FAMILY MEDICINE

## 2024-02-21 PROCEDURE — 99214 OFFICE O/P EST MOD 30 MIN: CPT | Performed by: FAMILY MEDICINE

## 2024-02-21 PROCEDURE — 87086 URINE CULTURE/COLONY COUNT: CPT | Performed by: FAMILY MEDICINE

## 2024-02-21 RX ORDER — TOLTERODINE 4 MG/1
4 CAPSULE, EXTENDED RELEASE ORAL DAILY
Qty: 30 CAPSULE | Refills: 5 | Status: SHIPPED | OUTPATIENT
Start: 2024-02-21

## 2024-02-21 NOTE — PROGRESS NOTES
"Name: Naa Harris      : 1955      MRN: [id number]  Encounter Provider: Sánchez Pryor DO  Encounter Date: 2024   Encounter department: [facility]    Assessment & Plan     1.  Dysuria #2.  Urinary frequency  3.  Urinary urgency  Urinalysis is normal, culture was sent  4.  Overactive bladder  Detrol LA 4 mg was started    5.  Vaginal dryness  6.  Urinary incontinence in female  7.  Vaginal irritation  Refer to OB/GYN, Dr. Noguera for evaluation  8.  COPD, stable lungs are clear follows with pulmonary medicine  9.  Return in 1 week if still with symptoms      1. Dysuria  -     POCT urine dip  -     Urine culture; Future    2. Urinary frequency  -     POCT urine dip  -     Urine culture; Future    3. Urinary urgency  -     POCT urine dip  -     Urine culture; Future    4. Vaginal dryness  -     POCT urine dip  -     Urine culture; Future  -     Ambulatory referral to Obstetrics / Gynecology; Future    5. Urinary incontinence in female  -     POCT urine dip  -     Urine culture; Future  -     Ambulatory referral to Obstetrics / Gynecology; Future    6. Vaginal irritation  -     POCT urine dip  -     Urine culture; Future  -     Ambulatory referral to Obstetrics / Gynecology; Future    7. Chronic obstructive pulmonary disease, unspecified COPD type (HCC)  Assessment & Plan:  Stable lungs are clear follows with pulmonology      8. OAB (overactive bladder)  Assessment & Plan:  Detrol LA 4 mg was ordered    Orders:  -     tolterodine (DETROL LA) 4 mg 24 hr capsule; Take 1 capsule (4 mg total) by mouth daily        Depression Screening and Follow-up Plan: Patient was screened for depression during today's encounter. They screened negative with a PHQ-2 score of 0.        Subjective      Patient started with \"vaginal dryness and irritation\" 2/3/2024.  This lasted a few days that has resolved however the past week patient is having some mild dysuria increasing urinary frequency and urgency " "patient also has noticed an increase in urinary incontinence.  Denies abdominal pain back pain fever chills      Review of Systems   Constitutional:         HPI   HENT: Negative.     Eyes: Negative.    Respiratory: Negative.     Cardiovascular: Negative.    Gastrointestinal:         HPI   Endocrine: Negative.    Genitourinary:         HPI   Musculoskeletal:         HPI   Skin: Negative.    Allergic/Immunologic: Negative.    Neurological: Negative.    Hematological: Negative.    Psychiatric/Behavioral: Negative.         Current Outpatient Medications on File Prior to Visit   Medication Sig   • Ascorbic Acid (vitamin C) 1000 MG tablet Take 1,000 mg by mouth daily   • celecoxib (CeleBREX) 200 mg capsule TAKE 1 CAPSULE DAILY WITH FOOD AS NEEDED FOR ARTHRITIS PAIN   • fluticasone (FLONASE) 50 mcg/act nasal spray 2 sprays into each nostril daily   • ipratropium (ATROVENT) 0.03 % nasal spray SPRAY 2 SPRAYS INTO EACH NOSTRIL 3 TIMES A DAY   • levothyroxine 50 mcg tablet TAKE 1 TABLET (50 MCG TOTAL) BY MOUTH DAILY IN THE EARLY MORNING   • loratadine-pseudoephedrine (CLARITIN-D 24-HOUR)  mg per 24 hr tablet Take 1 tablet by mouth daily   • montelukast (SINGULAIR) 10 mg tablet TAKE 1 TABLET BY MOUTH EVERY DAY   • Multiple Vitamins-Minerals (multivitamin with minerals) tablet Take 1 tablet by mouth daily   • Omega-3 Fatty Acids (FISH OIL) 1,000 mg Take 3,000 mg by mouth daily   • RESTASIS 0.05 % ophthalmic emulsion    • rosuvastatin (CRESTOR) 5 mg tablet TAKE 1 TABLET BY MOUTH EVERY DAY   • Symbicort 160-4.5 MCG/ACT inhaler Inhale 2 puffs 2 (two) times a day Rinse mouth after use.   • tiotropium (Spiriva HandiHaler) 18 mcg inhalation capsule Place 1 capsule (18 mcg total) into inhaler and inhale daily       Objective     /70 (BP Location: Right arm, Patient Position: Sitting, Cuff Size: Standard)   Pulse 77   Ht 5' 10\" (1.778 m)   Wt 80.3 kg (177 lb)   SpO2 98%   BMI 25.40 kg/m²     Physical Exam  Vitals and " nursing note reviewed.   Constitutional:       Appearance: Normal appearance.   HENT:      Head: Normocephalic and atraumatic.      Mouth/Throat:      Mouth: Mucous membranes are moist.   Eyes:      General: No scleral icterus.  Neck:      Vascular: No carotid bruit.   Cardiovascular:      Rate and Rhythm: Normal rate and regular rhythm.      Heart sounds: Normal heart sounds.   Pulmonary:      Effort: Pulmonary effort is normal.      Breath sounds: Normal breath sounds.   Abdominal:      General: Bowel sounds are normal. There is no distension.      Palpations: Abdomen is soft. There is no mass.      Tenderness: There is no abdominal tenderness. There is no right CVA tenderness, left CVA tenderness, guarding or rebound.      Hernia: No hernia is present.   Musculoskeletal:      Cervical back: Neck supple.      Right lower leg: No edema.      Left lower leg: No edema.   Skin:     General: Skin is warm.   Neurological:      General: No focal deficit present.      Mental Status: She is alert.   Psychiatric:         Mood and Affect: Mood normal.       Sánchez Pryor DO

## 2024-02-21 NOTE — PATIENT INSTRUCTIONS
Start Detrol LA 4 mg this will help the bladder discomfort and urinary incontinence  Follow with OB/GYN for a thorough exam secondary to vaginal dryness and irritation  Return in 1 week if still with symptoms

## 2024-02-22 ENCOUNTER — OFFICE VISIT (OUTPATIENT)
Dept: OBGYN CLINIC | Facility: CLINIC | Age: 69
End: 2024-02-22
Payer: MEDICARE

## 2024-02-22 VITALS
BODY MASS INDEX: 25.34 KG/M2 | WEIGHT: 177 LBS | HEIGHT: 70 IN | SYSTOLIC BLOOD PRESSURE: 118 MMHG | DIASTOLIC BLOOD PRESSURE: 64 MMHG

## 2024-02-22 DIAGNOSIS — B37.9 YEAST INFECTION: ICD-10-CM

## 2024-02-22 DIAGNOSIS — B96.89 BACTERIAL VAGINITIS: ICD-10-CM

## 2024-02-22 DIAGNOSIS — N89.8 VAGINAL DRYNESS: ICD-10-CM

## 2024-02-22 DIAGNOSIS — R32 URINARY INCONTINENCE IN FEMALE: ICD-10-CM

## 2024-02-22 DIAGNOSIS — N89.8 VAGINAL IRRITATION: Primary | ICD-10-CM

## 2024-02-22 DIAGNOSIS — N76.0 BACTERIAL VAGINITIS: ICD-10-CM

## 2024-02-22 LAB
BV WHIFF TEST VAG QL: POSITIVE
CLUE CELLS SPEC QL WET PREP: POSITIVE
PH SMN: ABNORMAL [PH]
SL AMB POCT WET MOUNT: ABNORMAL
T VAGINALIS VAG QL WET PREP: NEGATIVE
YEAST VAG QL WET PREP: POSITIVE

## 2024-02-22 PROCEDURE — 99204 OFFICE O/P NEW MOD 45 MIN: CPT | Performed by: OBSTETRICS & GYNECOLOGY

## 2024-02-22 PROCEDURE — 87210 SMEAR WET MOUNT SALINE/INK: CPT | Performed by: OBSTETRICS & GYNECOLOGY

## 2024-02-22 RX ORDER — FLUCONAZOLE 150 MG/1
150 TABLET ORAL ONCE
Qty: 1 TABLET | Refills: 0 | Status: SHIPPED | OUTPATIENT
Start: 2024-02-22 | End: 2024-02-22

## 2024-02-22 RX ORDER — METRONIDAZOLE 500 MG/1
500 TABLET ORAL EVERY 12 HOURS SCHEDULED
Qty: 14 TABLET | Refills: 0 | Status: SHIPPED | OUTPATIENT
Start: 2024-02-22 | End: 2024-02-29

## 2024-02-22 NOTE — PROGRESS NOTES
NEW PATIENT  Patient is a 68 y.o. No obstetric history on file. with No LMP recorded. Patient has had a hysterectomy. who presents requesting evaluation of vaginal irritation for about 3 weeks.   Pt reports she saw her PCP for urinary urgency and frequency. She reports this has also been going on for 3 weeks. She is also complaining of vaginal pain and dryness. She thinks it is from the amount of urination and drying of the area. She denies any vaginal discharge or bleeding. She reports her symptoms are internal in nature.   She denies changing any of personal sanitary products.   She denies any alleviating or exacerbating factors.   Past Medical History:   Diagnosis Date    Cataract     COPD (chronic obstructive pulmonary disease) (HCC)     History of total hysterectomy     Hyperlipidemia     Hypothyroid     Osteoarthritis        Past Surgical History:   Procedure Laterality Date    BLEPHAROPLASTY Bilateral     upper lid - Dr. Wilkerson    CATARACT EXTRACTION, BILATERAL      COLONOSCOPY      GANGLION CYST EXCISION Right 3/14/2023    Procedure: right long finger ganglion cyst 3 cm x 1 cm x 1 cm excision;  Surgeon: Edmar Gan MD;  Location: [facility];  Service: Orthopedics    HYSTERECTOMY      REVISION COLOSTOMY  1976    SHOULDER SURGERY  2006    S/P dislocated shoulder       OB History   No obstetric history on file.           Current Outpatient Medications:     Ascorbic Acid (vitamin C) 1000 MG tablet, Take 1,000 mg by mouth daily, Disp: , Rfl:     celecoxib (CeleBREX) 200 mg capsule, TAKE 1 CAPSULE DAILY WITH FOOD AS NEEDED FOR ARTHRITIS PAIN, Disp: 30 capsule, Rfl: 5    fluconazole (DIFLUCAN) 150 mg tablet, Take 1 tablet (150 mg total) by mouth once for 1 dose, Disp: 1 tablet, Rfl: 0    fluticasone (FLONASE) 50 mcg/act nasal spray, 2 sprays into each nostril daily, Disp: , Rfl:     ipratropium (ATROVENT) 0.03 % nasal spray, SPRAY 2 SPRAYS INTO EACH NOSTRIL 3 TIMES A DAY, Disp: 90 mL, Rfl: 2    levothyroxine  50 mcg tablet, TAKE 1 TABLET (50 MCG TOTAL) BY MOUTH DAILY IN THE EARLY MORNING, Disp: 90 tablet, Rfl: 1    loratadine-pseudoephedrine (CLARITIN-D 24-HOUR)  mg per 24 hr tablet, Take 1 tablet by mouth daily, Disp: , Rfl:     metroNIDAZOLE (FLAGYL) 500 mg tablet, Take 1 tablet (500 mg total) by mouth every 12 (twelve) hours for 7 days, Disp: 14 tablet, Rfl: 0    montelukast (SINGULAIR) 10 mg tablet, TAKE 1 TABLET BY MOUTH EVERY DAY, Disp: 90 tablet, Rfl: 3    Multiple Vitamins-Minerals (multivitamin with minerals) tablet, Take 1 tablet by mouth daily, Disp: , Rfl:     Omega-3 Fatty Acids (FISH OIL) 1,000 mg, Take 3,000 mg by mouth daily, Disp: , Rfl:     RESTASIS 0.05 % ophthalmic emulsion, , Disp: , Rfl:     rosuvastatin (CRESTOR) 5 mg tablet, TAKE 1 TABLET BY MOUTH EVERY DAY, Disp: 90 tablet, Rfl: 1    Symbicort 160-4.5 MCG/ACT inhaler, Inhale 2 puffs 2 (two) times a day Rinse mouth after use., Disp: 10.2 g, Rfl: 5    tiotropium (Spiriva HandiHaler) 18 mcg inhalation capsule, Place 1 capsule (18 mcg total) into inhaler and inhale daily, Disp: 90 capsule, Rfl: 5    tolterodine (DETROL LA) 4 mg 24 hr capsule, Take 1 capsule (4 mg total) by mouth daily, Disp: 30 capsule, Rfl: 5    Allergies   Allergen Reactions    Alcohol - Food Allergy     Other      narcotics      Mobic [Meloxicam] GI Intolerance       Social History     Socioeconomic History    Marital status: Single     Spouse name: None    Number of children: None    Years of education: None    Highest education level: None   Occupational History    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   Tobacco Use    Smoking status: Former     Current packs/day: 0.00     Average packs/day: 3.0 packs/day for 14.0 years (42.0 ttl pk-yrs)     Types: Cigarettes     Start date:      Quit date:      Years since quittin.1    Smokeless tobacco: Never   Vaping Use    Vaping status: Never Used   Substance and Sexual Activity    Alcohol use: Not Currently      "Comment: per [employer]-stopped drinking alcohol    Drug use: No     Comment: per [employer]-recovering from drug addiction    Sexual activity: Not Currently   Other Topics Concern    None   Social History Narrative    Daily coffee consumption 4 cups per day     Social Determinants of Health     Financial Resource Strain: Low Risk  (1/4/2024)    Overall Financial Resource Strain (CARDIA)     Difficulty of Paying Living Expenses: Not hard at all   Food Insecurity: Not on file   Transportation Needs: No Transportation Needs (1/4/2024)    PRAPARE - Transportation     Lack of Transportation (Medical): No     Lack of Transportation (Non-Medical): No   Physical Activity: Not on file   Stress: Not on file   Social Connections: Not on file   Intimate Partner Violence: Not on file   Housing Stability: Not on file       Family History   Problem Relation Age of Onset    Breast cancer Mother     Hypertension Mother     Alcohol abuse Father     Drug abuse Sister        Review of Systems   Constitutional:  Negative for chills, fatigue, fever and unexpected weight change.   HENT:  Negative for congestion, mouth sores and sore throat.    Respiratory:  Negative for cough, chest tightness, shortness of breath and wheezing.    Cardiovascular:  Negative for chest pain and palpitations.   Gastrointestinal:  Negative for abdominal distention, abdominal pain, constipation, diarrhea, nausea and vomiting.   Endocrine: Negative for cold intolerance and heat intolerance.   Genitourinary:  Positive for frequency, urgency and vaginal pain. Negative for dyspareunia (not active), dysuria, genital sores, menstrual problem, pelvic pain, vaginal bleeding and vaginal discharge.   Musculoskeletal:  Negative for arthralgias.   Skin:  Negative for color change and rash.   Neurological:  Negative for dizziness, light-headedness and headaches.   Hematological:  Negative for adenopathy.       Blood pressure 118/64, height 5' 10\" (1.778 m), weight 80.3 kg " (177 lb), not currently breastfeeding. and Body mass index is 25.4 kg/m².    Physical Exam  Constitutional:       General: She is not in acute distress.     Appearance: Normal appearance. She is well-developed and normal weight. She is not ill-appearing.   HENT:      Head: Normocephalic and atraumatic.   Eyes:      Extraocular Movements: Extraocular movements intact.      Conjunctiva/sclera: Conjunctivae normal.   Neck:      Thyroid: No thyromegaly.      Trachea: No tracheal deviation.   Cardiovascular:      Rate and Rhythm: Normal rate and regular rhythm.      Heart sounds: Normal heart sounds.   Pulmonary:      Effort: Pulmonary effort is normal. No respiratory distress.      Breath sounds: Normal breath sounds. No stridor. No wheezing or rales.   Abdominal:      General: Bowel sounds are normal. There is no distension.      Palpations: Abdomen is soft. There is no mass.      Tenderness: There is no abdominal tenderness. There is no guarding or rebound.      Hernia: No hernia is present.   Musculoskeletal:         General: No tenderness. Normal range of motion.      Cervical back: Normal range of motion and neck supple.   Lymphadenopathy:      Cervical: No cervical adenopathy.   Skin:     General: Skin is warm.      Findings: No erythema or rash.   Neurological:      Mental Status: She is alert and oriented to person, place, and time.   Psychiatric:         Mood and Affect: Mood normal.         Behavior: Behavior normal.         Thought Content: Thought content normal.         Judgment: Judgment normal.          vulva: normal external genitalia for age and no lesions, masses, epithelial changes, or exudate  vagina: color pale, rugae  flattening of rugae, and discharge  thick yellow, malodorous  cervix: surgically absent  uterus: surgically absent  adnexa: surgically absent and no masses or tenderness    Wet mount/koh: + BV and + yeast    A/P:  Pt is a 68 y.o. No obstetric history on file. with      Orpha was seen  today for new patient visit.    Diagnoses and all orders for this visit:    Vaginal irritation  -     POCT wet mount    Bacterial vaginitis  -     metroNIDAZOLE (FLAGYL) 500 mg tablet; Take 1 tablet (500 mg total) by mouth every 12 (twelve) hours for 7 days  -     POCT wet mount    Yeast infection  -     fluconazole (DIFLUCAN) 150 mg tablet; Take 1 tablet (150 mg total) by mouth once for 1 dose  -     POCT wet mount

## 2024-02-23 LAB — BACTERIA UR CULT: ABNORMAL

## 2024-02-26 DIAGNOSIS — N30.00 ACUTE CYSTITIS WITHOUT HEMATURIA: Primary | ICD-10-CM

## 2024-02-26 RX ORDER — CEPHALEXIN 500 MG/1
CAPSULE ORAL
Qty: 21 CAPSULE | Refills: 0 | Status: SHIPPED | OUTPATIENT
Start: 2024-02-26 | End: 2024-03-04

## 2024-02-28 ENCOUNTER — OFFICE VISIT (OUTPATIENT)
Dept: PULMONOLOGY | Facility: CLINIC | Age: 69
End: 2024-02-28
Payer: MEDICARE

## 2024-02-28 VITALS
WEIGHT: 174 LBS | BODY MASS INDEX: 24.91 KG/M2 | SYSTOLIC BLOOD PRESSURE: 124 MMHG | HEIGHT: 70 IN | HEART RATE: 70 BPM | OXYGEN SATURATION: 97 % | DIASTOLIC BLOOD PRESSURE: 72 MMHG | TEMPERATURE: 97.8 F

## 2024-02-28 DIAGNOSIS — J30.9 ALLERGIC RHINITIS, UNSPECIFIED SEASONALITY, UNSPECIFIED TRIGGER: ICD-10-CM

## 2024-02-28 DIAGNOSIS — J44.9 CHRONIC OBSTRUCTIVE PULMONARY DISEASE, UNSPECIFIED COPD TYPE (HCC): Primary | ICD-10-CM

## 2024-02-28 PROCEDURE — 99213 OFFICE O/P EST LOW 20 MIN: CPT | Performed by: INTERNAL MEDICINE

## 2024-02-28 NOTE — PROGRESS NOTES
"Office Progress Note - Pulmonary    Orpha Steven 68 y.o. female MRN: [id number]    Encounter: [id number]      Assessment:  Chronic obstructive pulmonary disease.  Allergic rhinitis.    Plan:   Spiriva once a day.  Symbicort 160/4.5, 2 inhalations twice a day.  Albuterol rescue inhaler 2 inhalations 4 days needed.  Fluticasone nasal spray 2 sprays to each nostril once a day.  Atrovent nasal spray 2 sprays to each nostril once a day.  Montelukast 10 mg once a day.  Follow-up in 6 months.    Discussion:   Patient's COPD is in remission.  I have maintained her on the Spiriva once a day.  She would like to switch to Spiriva Respimat instead of Handihaler.  I have provided her with samples to try it and if she likes it we switch her.  She will continue to use the Symbicort 160/4.5, 2 inhalations twice a day.  Her allergic rhinitis is well treated.  I have maintained her on the fluticasone and Atrovent nasal sprays once a day.  She will use montelukast 10 mg once a day.  I will see her in 6 months and a follow-up visit.      Subjective:   The patient is here for a follow-up visit.  In the fall she had 2 episodes of upper respiratory infection.  Both of them lasted 2 to 3 weeks.  Eventually resolved.  Now she is back to her baseline.  She denies any significant cough, wheezing or sputum production.  No chest pain.  She is using Spiriva once a day and Symbicort twice a day.  She has no significant nasal congestion or postnasal dripping.  She is on fluticasone and Atrovent nasal sprays once a day and montelukast 10 mg once a day        Review of systems:  A 12 point system review is done and aside from what is stated above the rest of the review of systems is negative.      Family history and social history are reviewed.    Medications list is reviewed.      Vitals: Blood pressure 124/72, pulse 70, temperature 97.8 °F (36.6 °C), temperature source Tympanic, height 5' 10\" (1.778 m), weight 78.9 kg (174 lb), SpO2 97%, not " currently breastfeeding.,     Physical Exam  Gen: Awake, alert, oriented x 3, no acute distress  HEENT: Mucous membranes moist, no oral lesions, no thrush  NECK: No accessory muscle use, JVP not elevated  Cardiac: Regular, single S1, single S2, no murmurs, no rubs, no gallops  Lungs: Decreased breath sounds.  No wheezing or rhonchi.  Abdomen: normoactive bowel sounds, soft nontender, nondistended, no rebound or rigidity, no guarding  Extremities: no cyanosis, no clubbing, no edema  Neuro:  Grossly nonfocal.  Skin:  No rash.    Lab Results   Component Value Date    WBC 5.66 01/02/2024    HGB 13.6 01/02/2024    HCT 41.6 01/02/2024    MCV 92 01/02/2024     01/02/2024     Lab Results   Component Value Date    SODIUM 139 01/02/2024    K 4.2 01/02/2024     01/02/2024    CO2 26 01/02/2024    BUN 17 01/02/2024    CREATININE 0.72 01/02/2024    CALCIUM 9.4 01/02/2024

## 2024-03-15 DIAGNOSIS — N32.81 OAB (OVERACTIVE BLADDER): ICD-10-CM

## 2024-03-15 RX ORDER — TOLTERODINE 4 MG/1
4 CAPSULE, EXTENDED RELEASE ORAL DAILY
Qty: 90 CAPSULE | Refills: 1 | Status: SHIPPED | OUTPATIENT
Start: 2024-03-15

## 2024-04-04 ENCOUNTER — TELEPHONE (OUTPATIENT)
Dept: DERMATOLOGY | Facility: CLINIC | Age: 69
End: 2024-04-04

## 2024-04-04 NOTE — TELEPHONE ENCOUNTER
Lmom that 06/20/24 appt w/Dr Carrera has been cx & r/s due to change in the providers schedule, to please call the office to confirm or r/

## 2024-04-05 DIAGNOSIS — E03.9 HYPOTHYROIDISM, UNSPECIFIED TYPE: ICD-10-CM

## 2024-04-05 RX ORDER — LEVOTHYROXINE SODIUM 0.05 MG/1
50 TABLET ORAL
Qty: 90 TABLET | Refills: 1 | Status: SHIPPED | OUTPATIENT
Start: 2024-04-05

## 2024-04-16 ENCOUNTER — OFFICE VISIT (OUTPATIENT)
Dept: DERMATOLOGY | Facility: CLINIC | Age: 69
End: 2024-04-16
Payer: MEDICARE

## 2024-04-16 VITALS — TEMPERATURE: 98 F | HEIGHT: 70 IN | WEIGHT: 174 LBS | BODY MASS INDEX: 24.91 KG/M2

## 2024-04-16 DIAGNOSIS — L81.4 LENTIGO: Primary | ICD-10-CM

## 2024-04-16 DIAGNOSIS — D48.5 NEOPLASM OF UNCERTAIN BEHAVIOR OF SKIN: ICD-10-CM

## 2024-04-16 PROCEDURE — 99204 OFFICE O/P NEW MOD 45 MIN: CPT | Performed by: DERMATOLOGY

## 2024-04-16 PROCEDURE — 17000 DESTRUCT PREMALG LESION: CPT | Performed by: DERMATOLOGY

## 2024-04-16 NOTE — PATIENT INSTRUCTIONS
ACTINIC KERATOSIS    Assessment and Plan:  Based on a thorough discussion of this condition and the management approach to it (including a comprehensive discussion of the known risks, side effects and potential benefits of treatment), the patient (family) agrees to implement the following specific plan:  When outside we recommend using a wide brim hat, sunglasses, long sleeve and pants, sunscreen with SPF 30+ with reapplication every 2 hours, or SPF specific clothing   liquid nitrogen to treat areas. Consent obtained. Expect area to blister, crust, and then fall off within 2 weeks. Please use vaseline.       Continue to monitor                     LENTIGO    Assessment and Plan:  Based on a thorough discussion of this condition and the management approach to it (including a comprehensive discussion of the known risks, side effects and potential benefits of treatment), the patient (family) agrees to implement the following specific plan:  When outside we recommend using a wide brim hat, sunglasses, long sleeve and pants, sunscreen with SPF 30+ with reapplication every 2 hours, or SPF specific clothing       What is a lentigo?  A lentigo is a pigmented flat or slightly raised lesion with a clearly defined edge. Unlike an ephelis (freckle), it does not fade in the winter months. There are several kinds of lentigo.  The name lentigo originally referred to its appearance resembling a small lentil. The plural of lentigo is lentigines, although “lentigos” is also in common use.    Who gets lentigines?  Lentigines can affect males and females of all ages and races. Solar lentigines are especially prevalent in fair skinned adults. Lentigines associated with syndromes are present at birth or arise during childhood.    What causes lentigines?  Common forms of lentigo are due to exposure to ultraviolet radiation:  Sun damage including sunburn   Indoor tanning   Phototherapy, especially photochemotherapy (PUVA)    Ionizing  radiation, eg radiation therapy, can also cause lentigines.  Several familial syndromes associated with widespread lentigines originate from mutations in Price-MAP kinase, mTOR signaling and PTEN pathways.    What is the treatment for lentigines?  Most lentigines are left alone. Attempts to lighten them may not be successful. The following approaches are used:  SPF 50+ broad-spectrum sunscreen   Hydroquinone bleaching cream   Alpha hydroxy acids   Vitamin C   Retinoids   Azelaic acid   Chemical peels  Individual lesions can be permanently removed using:  Cryotherapy   Intense pulsed light   Pigment lasers    How can lentigines be prevented?  Lentigines associated with exposure ultraviolet radiation can be prevented by very careful sun protection. Clothing is more successful at preventing new lentigines than are sunscreens.    What is the outlook for lentigines?  Lentigines usually persist. They may increase in number with age and sun exposure. Some in sun-protected sites may fade and disappear.

## 2024-04-16 NOTE — PROGRESS NOTES
"[facility] D[facility]atology Clinic Note     Patient Name: Naa Harris  Encounter Date: 04/16/2024     Have you been cared for by a [facility] Dermatologist in the last 3 years and, if so, which description applies to you?    NO.   I am considered a \"new\" patient and must complete all patient intake questions. I am FEMALE/of child-bearing potential.    REVIEW OF SYSTEMS:  Have you recently had or currently have any of the following? Recent fever or chills? No  Any non-healing wound? No  Are you pregnant or planning to become pregnant? No  Are you currently or planning to be nursing or breast feeding? No   PAST MEDICAL HISTORY:  Have you personally ever had or currently have any of the following?  If \"YES,\" then please provide more detail. Skin cancer (such as Melanoma, Basal Cell Carcinoma, Squamous Cell Carcinoma?  No  Tuberculosis, HIV/AIDS, Hepatitis B or C: No  Radiation Treatment No   HISTORY OF IMMUNOSUPPRESSION:   Do you have a history of any of the following:  Systemic Immunosuppression such as Diabetes, Biologic or Immunotherapy, Chemotherapy, Organ Transplantation, Bone Marrow Transplantation?  No    Answering \"YES\" requires the addition of the dotphrase \"IMMUNOSUPPRESSED\" as the first diagnosis of the patient's visit.   FAMILY HISTORY:  Any \"first degree relatives\" (parent, brother, sister, or child) with the following?    Skin Cancer, Pancreatic or Other Cancer? YES, see family history   PATIENT EXPERIENCE:    Do you want the Dermatologist to perform a COMPLETE skin exam today including a clinical examination under the \"bra and underwear\" areas?  NO-patient has upcoming appt with Dr Carrera  If necessary, do we have your permission to call and leave a detailed message on your Preferred Phone number that includes your specific medical information?  Yes      Allergies   Allergen Reactions   • Alcohol - Food Allergy    • Other      narcotics     • Mobic [Meloxicam] GI Intolerance      Current Outpatient " Medications:   •  Ascorbic Acid (vitamin C) 1000 MG tablet, Take 1,000 mg by mouth daily, Disp: , Rfl:   •  celecoxib (CeleBREX) 200 mg capsule, TAKE 1 CAPSULE DAILY WITH FOOD AS NEEDED FOR ARTHRITIS PAIN, Disp: 30 capsule, Rfl: 5  •  fluticasone (FLONASE) 50 mcg/act nasal spray, 2 sprays into each nostril daily, Disp: , Rfl:   •  ipratropium (ATROVENT) 0.03 % nasal spray, SPRAY 2 SPRAYS INTO EACH NOSTRIL 3 TIMES A DAY, Disp: 90 mL, Rfl: 2  •  levothyroxine 50 mcg tablet, TAKE 1 TABLET (50 MCG TOTAL) BY MOUTH DAILY IN THE EARLY MORNING, Disp: 90 tablet, Rfl: 1  •  loratadine-pseudoephedrine (CLARITIN-D 24-HOUR)  mg per 24 hr tablet, Take 1 tablet by mouth daily, Disp: , Rfl:   •  montelukast (SINGULAIR) 10 mg tablet, TAKE 1 TABLET BY MOUTH EVERY DAY, Disp: 90 tablet, Rfl: 3  •  Multiple Vitamins-Minerals (multivitamin with minerals) tablet, Take 1 tablet by mouth daily, Disp: , Rfl:   •  Omega-3 Fatty Acids (FISH OIL) 1,000 mg, Take 3,000 mg by mouth daily, Disp: , Rfl:   •  RESTASIS 0.05 % ophthalmic emulsion, , Disp: , Rfl:   •  rosuvastatin (CRESTOR) 5 mg tablet, TAKE 1 TABLET BY MOUTH EVERY DAY, Disp: 90 tablet, Rfl: 1  •  Symbicort 160-4.5 MCG/ACT inhaler, Inhale 2 puffs 2 (two) times a day Rinse mouth after use., Disp: 10.2 g, Rfl: 5  •  tiotropium (Spiriva HandiHaler) 18 mcg inhalation capsule, Place 1 capsule (18 mcg total) into inhaler and inhale daily, Disp: 90 capsule, Rfl: 5  •  tolterodine (DETROL LA) 4 mg 24 hr capsule, TAKE 1 CAPSULE BY MOUTH EVERY DAY (Patient not taking: Reported on 4/16/2024), Disp: 90 capsule, Rfl: 1          Whom besides the patient is providing clinical information about today's encounter?   NO ADDITIONAL HISTORIAN (patient alone provided history)    Physical Exam and Assessment/Plan by Diagnosis:    ACTINIC KERATOSIS    Physical Exam:  Anatomic Location Affected:  right zygomatic arch  Morphological Description:  Scaly pink papules  Pertinent Positives:  Pertinent  Negatives:      Assessment and Plan:  Based on a thorough discussion of this condition and the management approach to it (including a comprehensive discussion of the known risks, side effects and potential benefits of treatment), the patient (family) agrees to implement the following specific plan:  When outside we recommend using a wide brim hat, sunglasses, long sleeve and pants, sunscreen with SPF 30+ with reapplication every 2 hours, or SPF specific clothing   liquid nitrogen to treat areas. Consent obtained. Expect area to blister, crust, and then fall off within 2 weeks. Please use vaseline.                                     PROCEDURE:  DESTRUCTION OF PRE-MALIGNANT LESIONS  After a thorough discussion of treatment options and risk/benefits/alternatives (including but not limited to local pain, scarring, dyspigmentation, blistering, and possible superinfection), verbal and written consent were obtained and the aforementioned lesions were treated on with cryotherapy using liquid nitrogen x 1 cycle for 5-10 seconds.    TOTAL NUMBER of 1 pre-malignant lesions were treated today on the ANATOMIC LOCATION: right zygomatic arch.     The patient tolerated the procedure well, and after-care instructions were provided.    Continue to monitor area    LENTIGO    Physical Exam:  Anatomic Location Affected:  face  Morphological Description:  Light brown macules  Pertinent Positives:  Pertinent Negatives:    Additional History of Present Condition:      Assessment and Plan:  Based on a thorough discussion of this condition and the management approach to it (including a comprehensive discussion of the known risks, side effects and potential benefits of treatment), the patient (family) agrees to implement the following specific plan:  When outside we recommend using a wide brim hat, sunglasses, long sleeve and pants, sunscreen with SPF 30+ with reapplication every 2 hours, or SPF specific clothing       What is a lentigo?  A  lentigo is a pigmented flat or slightly raised lesion with a clearly defined edge. Unlike an ephelis (freckle), it does not fade in the winter months. There are several kinds of lentigo.  The name lentigo originally referred to its appearance resembling a small lentil. The plural of lentigo is lentigines, although “lentigos” is also in common use.    Who gets lentigines?  Lentigines can affect males and females of all ages and races. Solar lentigines are especially prevalent in fair skinned adults. Lentigines associated with syndromes are present at birth or arise during childhood.    What causes lentigines?  Common forms of lentigo are due to exposure to ultraviolet radiation:  Sun damage including sunburn   Indoor tanning   Phototherapy, especially photochemotherapy (PUVA)    Ionizing radiation, eg radiation therapy, can also cause lentigines.  Several familial syndromes associated with widespread lentigines originate from mutations in Price-MAP kinase, mTOR signaling and PTEN pathways.    What is the treatment for lentigines?  Most lentigines are left alone. Attempts to lighten them may not be successful. The following approaches are used:  SPF 50+ broad-spectrum sunscreen   Hydroquinone bleaching cream   Alpha hydroxy acids   Vitamin C   Retinoids   Azelaic acid   Chemical peels  Individual lesions can be permanently removed using:  Cryotherapy   Intense pulsed light   Pigment lasers    How can lentigines be prevented?  Lentigines associated with exposure ultraviolet radiation can be prevented by very careful sun protection. Clothing is more successful at preventing new lentigines than are sunscreens.    What is the outlook for lentigines?  Lentigines usually persist. They may increase in number with age and sun exposure. Some in sun-protected sites may fade and disappear.      Scribe Attestation    I,:  Joann Courtney am acting as a scribe while in the presence of the attending physician.:       I,:  Leoncio Carrera,  MD personally performed the services described in this documentation    as scribed in my presence.:

## 2024-05-08 ENCOUNTER — HOSPITAL ENCOUNTER (OUTPATIENT)
Dept: BONE DENSITY | Facility: MEDICAL CENTER | Age: 69
Discharge: HOME | End: 2024-05-08
Payer: MEDICARE

## 2024-05-08 DIAGNOSIS — Z78.0 POSTMENOPAUSAL ESTROGEN DEFICIENCY: ICD-10-CM

## 2024-05-08 DIAGNOSIS — Z00.00 HEALTHCARE MAINTENANCE: ICD-10-CM

## 2024-05-08 PROCEDURE — 77080 DXA BONE DENSITY AXIAL: CPT

## 2024-05-13 ENCOUNTER — TELEPHONE (OUTPATIENT)
Age: 69
End: 2024-05-13

## 2024-05-13 NOTE — TELEPHONE ENCOUNTER
Pt asking Dr. Salmeron to send script for Spiriva TWIST AUTOMATIC FORM OF INHALER    Pt does not want capsules.    Please advise pt when called in.

## 2024-05-15 DIAGNOSIS — J44.9 CHRONIC OBSTRUCTIVE PULMONARY DISEASE, UNSPECIFIED COPD TYPE (HCC): Primary | ICD-10-CM

## 2024-05-15 DIAGNOSIS — M81.0 AGE-RELATED OSTEOPOROSIS WITHOUT CURRENT PATHOLOGICAL FRACTURE: Primary | ICD-10-CM

## 2024-05-15 RX ORDER — TIOTROPIUM BROMIDE INHALATION SPRAY 3.12 UG/1
2 SPRAY, METERED RESPIRATORY (INHALATION) DAILY
Qty: 40 G | Refills: 3 | Status: SHIPPED | OUTPATIENT
Start: 2024-05-15 | End: 2024-05-22 | Stop reason: SDUPTHER

## 2024-05-15 NOTE — TELEPHONE ENCOUNTER
Patient called the RX Refill Line. Message is being forwarded to the office.     Patient was following up on the request for the Spiriva twist form inhaler. States that she did not hear from the pharmacy and will be out of medication by the end of the week.     Please contact patient at [phone] when it is called into the pharmacy.

## 2024-05-17 ENCOUNTER — TELEPHONE (OUTPATIENT)
Dept: PULMONOLOGY | Facility: CLINIC | Age: 69
End: 2024-05-17

## 2024-05-17 DIAGNOSIS — J44.9 CHRONIC OBSTRUCTIVE PULMONARY DISEASE, UNSPECIFIED COPD TYPE (HCC): ICD-10-CM

## 2024-05-17 NOTE — TELEPHONE ENCOUNTER
Reason for call:   [] Refill   [] Prior Auth  [x] Other:     Patient following up on the request for the Spiriva twist form inhaler. States that she did not hear from the pharmacy.    Patient inform that that Spiriva was sent to the pharmacy on 05.15.2024 but it does not say twist, its Spiriva respimat, patient state she is sure its correct, dr middleton knows which to order, I advised it was sent to [facility], patient state she does not know why it was sent to [facility] it should have gone to [facility], I asked if she would like to have us send to [facility], patient state she will call her insurance and call us back if she need too.    Routing to office as an FYI

## 2024-05-17 NOTE — TELEPHONE ENCOUNTER
Patient needs Spiriva Respimat called in to [facility] in [place] on [address] [facility].  Not the one with capsules. Needs called in and not sent in d/t not normally have but will order special.  Questions or concerns: [phone]

## 2024-05-22 RX ORDER — TIOTROPIUM BROMIDE INHALATION SPRAY 3.12 UG/1
2 SPRAY, METERED RESPIRATORY (INHALATION) DAILY
Qty: 40 G | Refills: 3 | Status: SHIPPED | OUTPATIENT
Start: 2024-05-22

## 2024-05-22 NOTE — TELEPHONE ENCOUNTER
Patient called Rx refill line to check on refill of Spiriva inhaler.   She DOES NOT want it sent to [facility] pharmacy    PLEASE SEND TO [facility], [address], [place] ASAP    THIS IS NOT A DUPLICATE

## 2024-07-08 ENCOUNTER — APPOINTMENT (OUTPATIENT)
Dept: LAB | Facility: CLINIC | Age: 69
End: 2024-07-08
Payer: MEDICARE

## 2024-07-08 DIAGNOSIS — Z12.31 BREAST CANCER SCREENING BY MAMMOGRAM: ICD-10-CM

## 2024-07-08 DIAGNOSIS — M15.9 PRIMARY OSTEOARTHRITIS INVOLVING MULTIPLE JOINTS: ICD-10-CM

## 2024-07-08 DIAGNOSIS — Z12.11 SCREENING FOR COLON CANCER: ICD-10-CM

## 2024-07-08 DIAGNOSIS — Z00.00 HEALTHCARE MAINTENANCE: ICD-10-CM

## 2024-07-08 DIAGNOSIS — L71.9 ROSACEA: ICD-10-CM

## 2024-07-08 DIAGNOSIS — Z80.3 FAMILY HISTORY OF BREAST CANCER IN MOTHER: ICD-10-CM

## 2024-07-08 DIAGNOSIS — J44.9 CHRONIC OBSTRUCTIVE PULMONARY DISEASE, UNSPECIFIED COPD TYPE (HCC): ICD-10-CM

## 2024-07-08 DIAGNOSIS — E03.9 ACQUIRED HYPOTHYROIDISM: ICD-10-CM

## 2024-07-08 DIAGNOSIS — J30.9 ALLERGIC RHINITIS, UNSPECIFIED SEASONALITY, UNSPECIFIED TRIGGER: ICD-10-CM

## 2024-07-08 DIAGNOSIS — Z78.0 POSTMENOPAUSAL ESTROGEN DEFICIENCY: ICD-10-CM

## 2024-07-08 DIAGNOSIS — E78.2 MIXED HYPERLIPIDEMIA: ICD-10-CM

## 2024-07-08 DIAGNOSIS — H69.90 DYSFUNCTION OF EUSTACHIAN TUBE, UNSPECIFIED LATERALITY: ICD-10-CM

## 2024-07-08 LAB
ALBUMIN SERPL BCG-MCNC: 4.2 G/DL (ref 3.5–5)
ALP SERPL-CCNC: 45 U/L (ref 34–104)
ALT SERPL W P-5'-P-CCNC: 24 U/L (ref 7–52)
ANION GAP SERPL CALCULATED.3IONS-SCNC: 7 MMOL/L (ref 4–13)
AST SERPL W P-5'-P-CCNC: 25 U/L (ref 13–39)
BACTERIA UR QL AUTO: ABNORMAL /HPF
BILIRUB SERPL-MCNC: 0.45 MG/DL (ref 0.2–1)
BILIRUB UR QL STRIP: NEGATIVE
BUN SERPL-MCNC: 18 MG/DL (ref 5–25)
CALCIUM SERPL-MCNC: 9.5 MG/DL (ref 8.4–10.2)
CHLORIDE SERPL-SCNC: 107 MMOL/L (ref 96–108)
CHOLEST SERPL-MCNC: 232 MG/DL
CLARITY UR: CLEAR
CO2 SERPL-SCNC: 26 MMOL/L (ref 21–32)
COLOR UR: YELLOW
CREAT SERPL-MCNC: 0.79 MG/DL (ref 0.6–1.3)
ERYTHROCYTE [DISTWIDTH] IN BLOOD BY AUTOMATED COUNT: 13.5 % (ref 11.6–15.1)
GFR SERPL CREATININE-BSD FRML MDRD: 76 ML/MIN/1.73SQ M
GLUCOSE P FAST SERPL-MCNC: 87 MG/DL (ref 65–99)
GLUCOSE UR STRIP-MCNC: NEGATIVE MG/DL
HCT VFR BLD AUTO: 44.2 % (ref 34.8–46.1)
HDLC SERPL-MCNC: 84 MG/DL
HGB BLD-MCNC: 14 G/DL (ref 11.5–15.4)
HGB UR QL STRIP.AUTO: NEGATIVE
HYALINE CASTS #/AREA URNS LPF: ABNORMAL /LPF
KETONES UR STRIP-MCNC: ABNORMAL MG/DL
LDLC SERPL CALC-MCNC: 120 MG/DL (ref 0–100)
LEUKOCYTE ESTERASE UR QL STRIP: ABNORMAL
MCH RBC QN AUTO: 29.5 PG (ref 26.8–34.3)
MCHC RBC AUTO-ENTMCNC: 31.7 G/DL (ref 31.4–37.4)
MCV RBC AUTO: 93 FL (ref 82–98)
MUCOUS THREADS UR QL AUTO: ABNORMAL
NITRITE UR QL STRIP: NEGATIVE
NON-SQ EPI CELLS URNS QL MICRO: ABNORMAL /HPF
PH UR STRIP.AUTO: 6 [PH]
PLATELET # BLD AUTO: 279 THOUSANDS/UL (ref 149–390)
PMV BLD AUTO: 10.6 FL (ref 8.9–12.7)
POTASSIUM SERPL-SCNC: 4.4 MMOL/L (ref 3.5–5.3)
PROT SERPL-MCNC: 7.3 G/DL (ref 6.4–8.4)
PROT UR STRIP-MCNC: ABNORMAL MG/DL
RBC # BLD AUTO: 4.74 MILLION/UL (ref 3.81–5.12)
RBC #/AREA URNS AUTO: ABNORMAL /HPF
SODIUM SERPL-SCNC: 140 MMOL/L (ref 135–147)
SP GR UR STRIP.AUTO: 1.02 (ref 1–1.03)
TRIGL SERPL-MCNC: 142 MG/DL
TSH SERPL DL<=0.05 MIU/L-ACNC: 4.09 UIU/ML (ref 0.45–4.5)
UROBILINOGEN UR STRIP-ACNC: <2 MG/DL
WBC # BLD AUTO: 5.99 THOUSAND/UL (ref 4.31–10.16)
WBC #/AREA URNS AUTO: ABNORMAL /HPF

## 2024-07-08 PROCEDURE — 80061 LIPID PANEL: CPT

## 2024-07-08 PROCEDURE — 85027 COMPLETE CBC AUTOMATED: CPT

## 2024-07-08 PROCEDURE — 81001 URINALYSIS AUTO W/SCOPE: CPT

## 2024-07-08 PROCEDURE — 80053 COMPREHEN METABOLIC PANEL: CPT

## 2024-07-08 PROCEDURE — 36415 COLL VENOUS BLD VENIPUNCTURE: CPT

## 2024-07-08 PROCEDURE — 84443 ASSAY THYROID STIM HORMONE: CPT

## 2024-07-09 DIAGNOSIS — J44.9 CHRONIC OBSTRUCTIVE PULMONARY DISEASE, UNSPECIFIED COPD TYPE (HCC): ICD-10-CM

## 2024-07-09 RX ORDER — BUDESONIDE AND FORMOTEROL FUMARATE DIHYDRATE 160; 4.5 UG/1; UG/1
AEROSOL RESPIRATORY (INHALATION)
Qty: 10.2 G | Refills: 5 | Status: SHIPPED | OUTPATIENT
Start: 2024-07-09

## 2024-07-11 ENCOUNTER — OFFICE VISIT (OUTPATIENT)
Dept: FAMILY MEDICINE CLINIC | Facility: CLINIC | Age: 69
End: 2024-07-11
Payer: MEDICARE

## 2024-07-11 VITALS
OXYGEN SATURATION: 99 % | SYSTOLIC BLOOD PRESSURE: 120 MMHG | BODY MASS INDEX: 25.58 KG/M2 | TEMPERATURE: 97.5 F | HEART RATE: 76 BPM | DIASTOLIC BLOOD PRESSURE: 82 MMHG | WEIGHT: 178.25 LBS

## 2024-07-11 DIAGNOSIS — Z80.3 FAMILY HISTORY OF BREAST CANCER IN MOTHER: ICD-10-CM

## 2024-07-11 DIAGNOSIS — E03.9 ACQUIRED HYPOTHYROIDISM: ICD-10-CM

## 2024-07-11 DIAGNOSIS — E78.2 MIXED HYPERLIPIDEMIA: ICD-10-CM

## 2024-07-11 DIAGNOSIS — J44.9 CHRONIC OBSTRUCTIVE PULMONARY DISEASE, UNSPECIFIED COPD TYPE (HCC): Primary | ICD-10-CM

## 2024-07-11 DIAGNOSIS — N32.81 OAB (OVERACTIVE BLADDER): ICD-10-CM

## 2024-07-11 DIAGNOSIS — M81.0 AGE-RELATED OSTEOPOROSIS WITHOUT CURRENT PATHOLOGICAL FRACTURE: ICD-10-CM

## 2024-07-11 DIAGNOSIS — M15.9 PRIMARY OSTEOARTHRITIS INVOLVING MULTIPLE JOINTS: ICD-10-CM

## 2024-07-11 DIAGNOSIS — L71.9 ROSACEA: ICD-10-CM

## 2024-07-11 PROCEDURE — 99214 OFFICE O/P EST MOD 30 MIN: CPT | Performed by: FAMILY MEDICINE

## 2024-07-11 PROCEDURE — G2211 COMPLEX E/M VISIT ADD ON: HCPCS | Performed by: FAMILY MEDICINE

## 2024-07-11 RX ORDER — CHOLECALCIFEROL (VITAMIN D3) 50 MCG
2000 TABLET ORAL DAILY
COMMUNITY

## 2024-07-11 RX ORDER — PHENOL 1.4 %
1200 AEROSOL, SPRAY (ML) MUCOUS MEMBRANE DAILY
COMMUNITY

## 2024-07-11 NOTE — ASSESSMENT & PLAN NOTE
Kina osteoporosis risk and benefit of fracture was discussed with this diagnosis.  Patient declines medication.  Patient to take 1200 mg of calcium daily and vitamin D 2000 units daily repeat 2 years

## 2024-07-11 NOTE — PATIENT INSTRUCTIONS
Start calcium 1200 mg daily and vitamin D 2000 units daily to help osteoporosis.  Also increase exercise to help osteoporosis  Complete mammography for breast cancer screening as you are high risk because your mother had breast cancer  Continue present therapy  Return in 6 months for office visit and blood work sooner if needed

## 2024-07-11 NOTE — PROGRESS NOTES
Ambulatory Visit  Name: Naa Harris      : 1955      MRN: [id number]  Encounter Provider: Sánchez Pryor DO  Encounter Date: 2024   [facility]    1.  COPD, lungs are clear patient to follow with pulmonary medicine #2.  Hypothyroidism, stable continue [place] therapy #3.  Osteoporosis DEXA scan was discussed.  Risk of fracture discussed.  Patient declined medication.  Patient use calcium 1200 mg daily and vitamin D 2000 units daily will repeat in 2 years patient also told to increase exercise to increase bone mass  4.  DJD currently asymptomatic  5.  OAB, stable Detrol was discontinued  6.  Pure hyperlipidemia stable continue Crestor 5 mg daily #7.  Rosacea, patient was referred to dermatology  8.  Family history of breast cancer mother, patient reminded to complete mammography as she is at increased risk for breast cancer  9.  Return in 6 months for office visit, blood work, and AWV      Assessment & Plan   1. Chronic obstructive pulmonary disease, unspecified COPD type (HCC)  Assessment & Plan:  Stable, lungs are clear patient to follow with pulmonologist  Orders:  -     CBC; Future; Expected date: 01/10/2025  -     Comprehensive metabolic panel; Future; Expected date: 01/10/2025  -     Lipid Panel with Direct LDL reflex; Future; Expected date: 01/10/2025  -     TSH, 3rd generation with Free T4 reflex; Future; Expected date: 01/10/2025  -     UA (URINE) with reflex to Scope; Future; Expected date: 01/10/2025  -     Vitamin D 25 hydroxy; Future; Expected date: 01/10/2025  2. Acquired hypothyroidism  Assessment & Plan:  Stable continue levothyroxine 50 mcg daily  Orders:  -     CBC; Future; Expected date: 01/10/2025  -     Comprehensive metabolic panel; Future; Expected date: 01/10/2025  -     Lipid Panel with Direct LDL reflex; Future; Expected date: 01/10/2025  -     TSH, 3rd generation with Free T4 reflex; Future; Expected date: 01/10/2025  -     UA  (URINE) with reflex to Scope; Future; Expected date: 01/10/2025  -     Vitamin D 25 hydroxy; Future; Expected date: 01/10/2025  3. Age-related osteoporosis without current pathological fracture  Assessment & Plan:  Kina osteoporosis risk and benefit of fracture was discussed with this diagnosis.  Patient declines medication.  Patient to take 1200 mg of calcium daily and vitamin D 2000 units daily repeat 2 years  Orders:  -     CBC; Future; Expected date: 01/10/2025  -     Comprehensive metabolic panel; Future; Expected date: 01/10/2025  -     Lipid Panel with Direct LDL reflex; Future; Expected date: 01/10/2025  -     TSH, 3rd generation with Free T4 reflex; Future; Expected date: 01/10/2025  -     UA (URINE) with reflex to Scope; Future; Expected date: 01/10/2025  -     Vitamin D 25 hydroxy; Future; Expected date: 01/10/2025  4. Primary osteoarthritis involving multiple joints  -     CBC; Future; Expected date: 01/10/2025  -     Comprehensive metabolic panel; Future; Expected date: 01/10/2025  -     Lipid Panel with Direct LDL reflex; Future; Expected date: 01/10/2025  -     TSH, 3rd generation with Free T4 reflex; Future; Expected date: 01/10/2025  -     UA (URINE) with reflex to Scope; Future; Expected date: 01/10/2025  -     Vitamin D 25 hydroxy; Future; Expected date: 01/10/2025  5. OAB (overactive bladder)  Assessment & Plan:  Doing well patient stopped Adderall    Orders:  -     CBC; Future; Expected date: 01/10/2025  -     Comprehensive metabolic panel; Future; Expected date: 01/10/2025  -     Lipid Panel with Direct LDL reflex; Future; Expected date: 01/10/2025  -     TSH, 3rd generation with Free T4 reflex; Future; Expected date: 01/10/2025  -     UA (URINE) with reflex to Scope; Future; Expected date: 01/10/2025  -     Vitamin D 25 hydroxy; Future; Expected date: 01/10/2025  6. Family history of breast cancer in mother  Assessment & Plan:  Reminded patient to complete mammography as she is at increased  risk for breast cancer  Orders:  -     CBC; Future; Expected date: 01/10/2025  -     Comprehensive metabolic panel; Future; Expected date: 01/10/2025  -     Lipid Panel with Direct LDL reflex; Future; Expected date: 01/10/2025  -     TSH, 3rd generation with Free T4 reflex; Future; Expected date: 01/10/2025  -     UA (URINE) with reflex to Scope; Future; Expected date: 01/10/2025  -     Vitamin D 25 hydroxy; Future; Expected date: 01/10/2025  7. Mixed hyperlipidemia  Assessment & Plan:  Total is gone from 260-32 LDL still under 130 will continue Crestor 5 mg daily patient to follow a low fat low cholesterol diet  Orders:  -     CBC; Future; Expected date: 01/10/2025  -     Comprehensive metabolic panel; Future; Expected date: 01/10/2025  -     Lipid Panel with Direct LDL reflex; Future; Expected date: 01/10/2025  -     TSH, 3rd generation with Free T4 reflex; Future; Expected date: 01/10/2025  -     UA (URINE) with reflex to Scope; Future; Expected date: 01/10/2025  -     Vitamin D 25 hydroxy; Future; Expected date: 01/10/2025  8. Rosacea  Assessment & Plan:  Patient was referred to dermatology    Orders:  -     CBC; Future; Expected date: 01/10/2025  -     Comprehensive metabolic panel; Future; Expected date: 01/10/2025  -     Lipid Panel with Direct LDL reflex; Future; Expected date: 01/10/2025  -     TSH, 3rd generation with Free T4 reflex; Future; Expected date: 01/10/2025  -     UA (URINE) with reflex to Scope; Future; Expected date: 01/10/2025  -     Vitamin D 25 hydroxy; Future; Expected date: 01/10/2025       History of Present Illness     Patient doing well with Vitamed complaints concerns at present time.  Blood work was discussed with the patient        Review of Systems   Constitutional: Negative.    HENT: Negative.     Eyes: Negative.    Respiratory: Negative.     Cardiovascular: Negative.    Gastrointestinal: Negative.    Endocrine: Negative.    Genitourinary: Negative.    Musculoskeletal: Negative.     Skin: Negative.    Allergic/Immunologic: Negative.    Neurological: Negative.    Hematological: Negative.    Psychiatric/Behavioral: Negative.         Objective     /82   Pulse 76   Temp 97.5 °F (36.4 °C) (Temporal)   Wt 80.9 kg (178 lb 4 oz)   SpO2 99%   BMI 25.58 kg/m²     Physical Exam  Vitals and nursing note reviewed.   Constitutional:       Appearance: Normal appearance.   HENT:      Head: Normocephalic and atraumatic.      Mouth/Throat:      Mouth: Mucous membranes are moist.   Eyes:      General: No scleral icterus.  Neck:      Vascular: No carotid bruit.   Cardiovascular:      Rate and Rhythm: Normal rate and regular rhythm.      Heart sounds: Normal heart sounds.   Pulmonary:      Effort: Pulmonary effort is normal.      Breath sounds: Normal breath sounds.   Abdominal:      General: Bowel sounds are normal.      Palpations: Abdomen is soft.      Tenderness: There is no abdominal tenderness.   Musculoskeletal:      Cervical back: Neck supple.      Right lower leg: No edema.      Left lower leg: No edema.   Skin:     General: Skin is warm and dry.   Neurological:      General: No focal deficit present.      Mental Status: She is alert.   Psychiatric:         Mood and Affect: Mood normal.       Administrative Statements

## 2024-07-11 NOTE — ASSESSMENT & PLAN NOTE
Total is gone from 260-32 LDL still under 130 will continue Crestor 5 mg daily patient to follow a low fat low cholesterol diet

## 2024-08-01 ENCOUNTER — OFFICE VISIT (OUTPATIENT)
Dept: DERMATOLOGY | Facility: CLINIC | Age: 69
End: 2024-08-01
Payer: MEDICARE

## 2024-08-01 VITALS — TEMPERATURE: 98.6 F | HEIGHT: 70 IN | BODY MASS INDEX: 25.05 KG/M2 | WEIGHT: 175 LBS

## 2024-08-01 DIAGNOSIS — E78.5 HYPERLIPIDEMIA, UNSPECIFIED HYPERLIPIDEMIA TYPE: ICD-10-CM

## 2024-08-01 DIAGNOSIS — L71.9 ROSACEA: ICD-10-CM

## 2024-08-01 PROCEDURE — 99213 OFFICE O/P EST LOW 20 MIN: CPT | Performed by: DERMATOLOGY

## 2024-08-01 RX ORDER — ROSUVASTATIN CALCIUM 5 MG/1
TABLET, COATED ORAL
Qty: 100 TABLET | Refills: 1 | Status: SHIPPED | OUTPATIENT
Start: 2024-08-01

## 2024-08-01 NOTE — PROGRESS NOTES
"[facility] Note     Patient Name: Naa Harris  Encounter Date: 08/01/2024     Have you been cared for by a [facility] Dermatologist in the last 3 years and, if so, which description applies to you?    Yes.  I have been here within the last 3 years, and my medical history has NOT changed since that time.  I am FEMALE/of child-bearing potential.    REVIEW OF SYSTEMS:  Have you recently had or currently have any of the following? No changes in my recent health.   PAST MEDICAL HISTORY:  Have you personally ever had or currently have any of the following?  If \"YES,\" then please provide more detail. No changes in my medical history.   HISTORY OF IMMUNOSUPPRESSION: Do you have a history of any of the following:  Systemic Immunosuppression such as Diabetes, Biologic or Immunotherapy, Chemotherapy, Organ Transplantation, Bone Marrow Transplantation?  No     Answering \"YES\" requires the addition of the dotphrase \"IMMUNOSUPPRESSED\" as the first diagnosis of the patient's visit.   FAMILY HISTORY:  Any \"first degree relatives\" (parent, brother, sister, or child) with the following?    No changes in my family's known health.   PATIENT EXPERIENCE:    Do you want the Dermatologist to perform a COMPLETE skin exam today including a clinical examination under the \"bra and underwear\" areas?  NO  If necessary, do we have your permission to call and leave a detailed message on your Preferred Phone number that includes your specific medical information?  Yes      Allergies   Allergen Reactions   • Alcohol - Food Allergy    • Other      narcotics     • Mobic [Meloxicam] GI Intolerance      Current Outpatient Medications:   •  Ascorbic Acid (vitamin C) 1000 MG tablet, Take 1,000 mg by mouth daily, Disp: , Rfl:   •  calcium carbonate (OS-BERNICE) 600 MG tablet, Take 1,200 mg by mouth daily, Disp: , Rfl:   •  celecoxib (CeleBREX) 200 mg capsule, TAKE 1 CAPSULE DAILY WITH FOOD AS NEEDED FOR ARTHRITIS PAIN, Disp: 30 capsule, " Rfl: 5  •  Cholecalciferol (Vitamin D) 50 MCG (2000 UT) tablet, Take 2,000 Units by mouth daily, Disp: , Rfl:   •  fluticasone (FLONASE) 50 mcg/act nasal spray, 2 sprays into each nostril daily, Disp: , Rfl:   •  ipratropium (ATROVENT) 0.03 % nasal spray, SPRAY 2 SPRAYS INTO EACH NOSTRIL 3 TIMES A DAY, Disp: 90 mL, Rfl: 2  •  levothyroxine 50 mcg tablet, TAKE 1 TABLET (50 MCG TOTAL) BY MOUTH DAILY IN THE EARLY MORNING, Disp: 90 tablet, Rfl: 1  •  loratadine-pseudoephedrine (CLARITIN-D 24-HOUR)  mg per 24 hr tablet, Take 1 tablet by mouth daily, Disp: , Rfl:   •  montelukast (SINGULAIR) 10 mg tablet, TAKE 1 TABLET BY MOUTH EVERY DAY, Disp: 90 tablet, Rfl: 3  •  Multiple Vitamins-Minerals (multivitamin with minerals) tablet, Take 1 tablet by mouth daily, Disp: , Rfl:   •  Omega-3 Fatty Acids (FISH OIL) 1,000 mg, Take 3,000 mg by mouth daily, Disp: , Rfl:   •  RESTASIS 0.05 % ophthalmic emulsion, , Disp: , Rfl:   •  rosuvastatin (CRESTOR) 5 mg tablet, TAKE 1 TABLET BY MOUTH EVERY DAY, Disp: 100 tablet, Rfl: 1  •  Symbicort 160-4.5 MCG/ACT inhaler, INHALE 2 PUFFS BY MOUTH 2 TIMES A DAY RINSE MOUTH AFTER USE., Disp: 10.2 g, Rfl: 5  •  tiotropium (Spiriva Respimat) 2.5 MCG/ACT AERS inhaler, Inhale 2 puffs daily, Disp: 40 g, Rfl: 3          Whom besides the patient is providing clinical information about today's encounter?   NO ADDITIONAL HISTORIAN (patient alone provided history)    Physical Exam and Assessment/Plan by Diagnosis:      ACTINIC KERATOSIS   Physical Exam:  Anatomic Location Affected:  RIGHT ZYGOMATIC ARCH   Morphological Description:  scaly pink papules  Pertinent Positives:  Pertinent Negatives:    Additional History of Present Condition:  Patient is present for follow up on AK. Patient had cryotherapy performed on 04/16/2024.      Assessment and Plan:  Based on a thorough discussion of this condition and the management approach to it (including a comprehensive discussion of the known risks, side  effects and potential benefits of treatment), the patient (family) agrees to implement the following specific plan:  Reassured no reoccurrence        ROSACEA    Physical Exam:  Anatomic Location Affected:  face  Morphological Description:  erythematous    Pertinent Positives:  Pertinent Negatives:    Additional History of Present Condition:  Patient states in the summer she breaks out and gets red cheeks. Patient states this year she has been inside more due to the heat and she has not had any flares this year.     Assessment and Plan:  Based on a thorough discussion of this condition and the management approach to it (including a comprehensive discussion of the known risks, side effects and potential benefits of treatment), the patient (family) agrees to implement the following specific plan:  restart Metrogel 0.75% as needed for flares     Rosacea is a chronic rash affecting the mid-face including the nose, cheeks, chin, forehead, and eyelids. The incidence is usually greatest between the ages of 30-60 years and is more common in people with fair skin. Common characteristics include redness, telangiectasias, papules and pustules over affected areas. Rosacea may look similar to acne, but there is a lack of comedones. Occasionally the eyes may also be involved in ocular rosacea. In advanced disease, enlargement of the sebaceous glands in the nose, termed rhinophyma, may be present.     Rosacea results in red spots (papules) and sometimes pustules over the face, but unlike acne there are no blackheads, whiteheads, or cystic nodules. Patients often experience increased facial flushing with prominent blood vessels (erythematotelangiectatic rosacea) and dry, sensitive skin. These symptoms are exacerbated by sun exposure, hot or spicy foods, topical steroids and oil-based facial products.     In ocular rosacea, eyelids may be red and sore due to conjunctivitis, keratitis, and episcleritis. If rhinophyma develops due to  enlargement of sebaceous glands, the patient may have an enlarged and irregularly shaped nose with prominent pores. In rosacea that is refractory to treatment, patients can develop persistent redness and swelling of the face due to lymphatic obstruction (Morbihan disease).     Distribution around the cheeks may be confused with the malar or “butterfly rash” of lupus. However, the rash of lupus spares the nasal creases and lacks papules and pustules. If signs of photosensitivity, oral ulcers, arthritis, and kidney dysfunction are present then consider referral to a rheumatologist.     There are many potential causes of rosacea including genetic, environmental, vascular, and inflammatory factors. These include, but are not limited to:  Chronic exposure to ultraviolet radiation   Increased immune responses in the form of cathelicidins that promote vessel dilation and infiltration with white blood cells (neutrophils) into the dermis  Increased matrix metalloproteinases such as collagen and elastase that remodel normal tissue may contribute to inflammation of the skin making it thicker and harder  There is some evidence to suggest that increased numbers of demodex mites on patient skin may contribute to rosacea papules     General Treatment Approach   Avoid exacerbating factors such as heat, spicy foods, and alcohol   Use daily SPF30+ sunscreen and other methods of coverage for sun protection  Use water-based make-up   Avoid applying topical steroids to affected areas as they can cause perioral dermatitis and exacerbate rosacea     Topical Treatment Approach  Metronidazole cream or gel by itself or in combination with oral antibiotics for more severe cases  Azelaic acid cream or lotion is effective for mild inflammatory rosacea when applied twice daily to affected areas  Brimonidine gel and oxymetazoline hydrochloride cream can reduce facial redness temporarily   Ivermectin cream can treat papulopustular rosacea by  controlling demodex mites and inflammation   Pimecrolimus cream or tacrolimus ointment twice a day for 2-3 months can help reduce inflammation    Oral Treatment Approach  Antibiotics such as doxycycline, minocycline, or erythromycin for 1-3 months  Clonidine and carvedilol can help reduce facial flushing and are generally well tolerated. Common side effects include low blood pressure, gastrointestinal upset, dry eyes, blurred vision and low heart rate.   Isotretinoin at low doses can be effective for long term treatment when antibiotics fail. Side effects may make it unsuitable for some patients.   NSAIDs such as diclofenac can help reduce discomfort and redness in the skin.     Procedural/Surgical Treatment Approach   Vascular lasers or intense pulsed light treatment may be used to treat persistent telangiectasia and papulopustular rosacea  Plastic surgery and carbon dioxide lasers may be used to treat rhinophyma    Scribe Attestation    I,:  Jessenia Callejas am acting as a scribe while in the presence of the attending physician.:       I,:  Leoncio Carrera MD personally performed the services described in this documentation    as scribed in my presence.:

## 2024-08-02 ENCOUNTER — OFFICE VISIT (OUTPATIENT)
Dept: PODIATRY | Facility: CLINIC | Age: 69
End: 2024-08-02
Payer: MEDICARE

## 2024-08-02 ENCOUNTER — TELEPHONE (OUTPATIENT)
Age: 69
End: 2024-08-02

## 2024-08-02 ENCOUNTER — APPOINTMENT (OUTPATIENT)
Dept: RADIOLOGY | Facility: CLINIC | Age: 69
End: 2024-08-02
Payer: MEDICARE

## 2024-08-02 VITALS
DIASTOLIC BLOOD PRESSURE: 82 MMHG | WEIGHT: 179 LBS | SYSTOLIC BLOOD PRESSURE: 124 MMHG | BODY MASS INDEX: 25.62 KG/M2 | HEIGHT: 70 IN

## 2024-08-02 DIAGNOSIS — S92.424A CLOSED NONDISPLACED FRACTURE OF DISTAL PHALANX OF RIGHT GREAT TOE, INITIAL ENCOUNTER: Primary | ICD-10-CM

## 2024-08-02 DIAGNOSIS — M79.674 PAIN IN RIGHT TOE(S): ICD-10-CM

## 2024-08-02 DIAGNOSIS — S99.921A INJURY OF RIGHT GREAT TOE, INITIAL ENCOUNTER: ICD-10-CM

## 2024-08-02 PROCEDURE — 73630 X-RAY EXAM OF FOOT: CPT

## 2024-08-02 PROCEDURE — 28490 TREAT BIG TOE FRACTURE: CPT | Performed by: PODIATRIST

## 2024-08-02 PROCEDURE — 99202 OFFICE O/P NEW SF 15 MIN: CPT | Performed by: PODIATRIST

## 2024-08-02 NOTE — TELEPHONE ENCOUNTER
Caller: Patient     Doctor/Office: Podiatry     Call regarding :  Missed call     Call was transferred to: Podiatry

## 2024-08-02 NOTE — TELEPHONE ENCOUNTER
Caller: Naa Harris    Doctor: Joe    Reason for call: Naa had an accident last night and removed her right great toenail.  Is there any way to get her in to see Dr. Diaz today?  Thank you.      Call back#: [phone]

## 2024-08-02 NOTE — TELEPHONE ENCOUNTER
Caller: Naa Harris    Doctor: Joe    Reason for call: Naa missed call from office. Can someone reach out to her again?  Thank you.      Call back#: [phone]

## 2024-08-02 NOTE — PROGRESS NOTES
"Ambulatory Visit  Name: Naa Harris      : 1955      MRN: [id number]  Encounter Provider: Mario Diaz DPM  Encounter Date: 2024   Encounter department: [facility] PODIATRY [place]    Assessment & Plan   1. Closed nondisplaced fracture of distal phalanx of right great toe, initial encounter  -     Post Op Shoe  -     XR foot 3+ vw right; Future; Expected date: 2024  -     Orthopedic injury treatment  2. Pain in right toe(s)    Rx x-ray right foot complete to evaluate underlying osseous structures for fracture due to trauma.  X-rays personally reviewed right great toe shows transverse fracture through proximal one third of distal phalanx.  It is nondisplaced and in good alignment.  Moderate hallux valgus deformity also noted.  Closed treatment fracture right great toe as noted below  Dispensed postop shoe to be worn for the next 4 to 5 weeks.  Dry sterile dressing with Betadine Adaptic and gauze applied to great toe with Steri-Strips quarter inch facilitating a stabilizing linear splint.    Patient instructed on appropriate wound care and will follow-up for evaluation in 2 weeks.  Instructed to keep foot elevated dry and clean.  Call immediately if any signs of infection are noted.      Orthopedic injury treatment    Date/Time: 2024 4:15 PM    Performed by: Mario Diaz DPM  Authorized by: Mario Diaz DPM    Patient Location:  Other (comment)  Universal Protocol:  Consent: Verbal consent obtained.  Risks and benefits: risks, benefits and alternatives were discussed  Consent given by: patient  Time out: Immediately prior to procedure a \"time out\" was called to verify the correct patient, procedure, equipment, support staff and site/side marked as required.  Patient understanding: patient states understanding of the procedure being performed  Patient identity confirmed: verbally with patient    Injury location:  Toe  Location details:  Right great toe  Injury type:  " Fracture  Fracture type: distal phalanx    Neurovascular status: Neurovascularly intact    Distal perfusion: normal    Neurological function: normal    Range of motion: reduced    Local anesthesia used?: No    Manipulation performed?: No    Immobilization: Postop shoe.  Neurovascular status: Neurovascularly intact    Distal perfusion: normal    Neurological function: normal    Range of motion: unchanged    Patient tolerance:  Patient tolerated the procedure well with no immediate complications       History of Present Illness     Naa Harris is a 68 y.o. female who presents painful swollen bleeding right great toe.  Tripped last night injuring toe bending it down.  Toe is now swollen and painful.    Review of Systems   Constitutional: Negative.    HENT: Negative.     Eyes: Negative.    Respiratory: Negative.     Cardiovascular: Negative.    Gastrointestinal: Negative.    Endocrine: Negative.    Genitourinary: Negative.    Musculoskeletal:  Positive for joint swelling.        Pain and swelling right great toe   Skin:         Bleeding back edge of right great toenail   Allergic/Immunologic: Negative.    Neurological: Negative.    Hematological: Negative.    Psychiatric/Behavioral: Negative.       Past Medical History   Past Medical History:   Diagnosis Date   • Cataract    • COPD (chronic obstructive pulmonary disease) (HCC)    • History of total hysterectomy    • Hyperlipidemia    • Hypothyroid    • Osteoarthritis      Past Surgical History:   Procedure Laterality Date   • BLEPHAROPLASTY Bilateral     upper lid - Dr. Wilkerson   • CATARACT EXTRACTION, BILATERAL     • COLONOSCOPY     • GANGLION CYST EXCISION Right 3/14/2023    Procedure: right long finger ganglion cyst 3 cm x 1 cm x 1 cm excision;  Surgeon: Edmar Gan MD;  Location: [facility];  Service: Orthopedics   • HYSTERECTOMY     • REVISION COLOSTOMY  1976   • SHOULDER SURGERY  2006    S/P dislocated shoulder     Family History   Problem Relation Age of  "Onset   • Breast cancer Mother    • Hypertension Mother    • Alcohol abuse Father    • Drug abuse Sister      Current Outpatient Medications on File Prior to Visit   Medication Sig Dispense Refill   • Ascorbic Acid (vitamin C) 1000 MG tablet Take 1,000 mg by mouth daily     • calcium carbonate (OS-BERNICE) 600 MG tablet Take 1,200 mg by mouth daily     • celecoxib (CeleBREX) 200 mg capsule TAKE 1 CAPSULE DAILY WITH FOOD AS NEEDED FOR ARTHRITIS PAIN 30 capsule 5   • Cholecalciferol (Vitamin D) 50 MCG (2000 UT) tablet Take 2,000 Units by mouth daily     • fluticasone (FLONASE) 50 mcg/act nasal spray 2 sprays into each nostril daily     • ipratropium (ATROVENT) 0.03 % nasal spray SPRAY 2 SPRAYS INTO EACH NOSTRIL 3 TIMES A DAY 90 mL 2   • levothyroxine 50 mcg tablet TAKE 1 TABLET (50 MCG TOTAL) BY MOUTH DAILY IN THE EARLY MORNING 90 tablet 1   • loratadine-pseudoephedrine (CLARITIN-D 24-HOUR)  mg per 24 hr tablet Take 1 tablet by mouth daily     • montelukast (SINGULAIR) 10 mg tablet TAKE 1 TABLET BY MOUTH EVERY DAY 90 tablet 3   • Multiple Vitamins-Minerals (multivitamin with minerals) tablet Take 1 tablet by mouth daily     • Omega-3 Fatty Acids (FISH OIL) 1,000 mg Take 3,000 mg by mouth daily     • RESTASIS 0.05 % ophthalmic emulsion      • rosuvastatin (CRESTOR) 5 mg tablet TAKE 1 TABLET BY MOUTH EVERY  tablet 1   • Symbicort 160-4.5 MCG/ACT inhaler INHALE 2 PUFFS BY MOUTH 2 TIMES A DAY RINSE MOUTH AFTER USE. 10.2 g 5   • tiotropium (Spiriva Respimat) 2.5 MCG/ACT AERS inhaler Inhale 2 puffs daily 40 g 3     No current facility-administered medications on file prior to visit.     Allergies   Allergen Reactions   • Alcohol - Food Allergy    • Other      narcotics     • Mobic [Meloxicam] GI Intolerance      Objective     /82 (BP Location: Left arm, Patient Position: Sitting, Cuff Size: Adult)   Ht 5' 10\" (1.778 m) Comment: STATED  Wt 81.2 kg (179 lb)   BMI 25.68 kg/m²     Physical Exam  Vitals and " nursing note reviewed.   Constitutional:       General: She is not in acute distress.     Appearance: Normal appearance. She is well-developed.   HENT:      Head: Normocephalic and atraumatic.      Nose: Nose normal.   Eyes:      Conjunctiva/sclera: Conjunctivae normal.   Cardiovascular:      Rate and Rhythm: Normal rate and regular rhythm.      Pulses:           Dorsalis pedis pulses are 1+ on the right side and 1+ on the left side.        Posterior tibial pulses are 1+ on the right side and 1+ on the left side.   Pulmonary:      Effort: Pulmonary effort is normal. No respiratory distress.   Musculoskeletal:         General: Swelling, tenderness and signs of injury present.      Cervical back: Neck supple.      Comments: Right great toe: Painful to palpation with edema and ecchymosis noted of proximal nail fold and interphalangeal joint.  No signs of infection.  Back edge of nail is bleeding on the medial side with slight little laceration of corner of nail fold which is too small to suture.   Feet:      Right foot:      Protective Sensation: 10 sites tested.  10 sites sensed.      Left foot:      Protective Sensation: 10 sites tested.  10 sites sensed.   Skin:     General: Skin is warm and dry.      Capillary Refill: Capillary refill takes 2 to 3 seconds.      Findings: Bruising present.   Neurological:      General: No focal deficit present.      Mental Status: She is alert.   Psychiatric:         Mood and Affect: Mood normal.       Administrative Statements

## 2024-08-16 ENCOUNTER — OFFICE VISIT (OUTPATIENT)
Dept: PODIATRY | Facility: CLINIC | Age: 69
End: 2024-08-16

## 2024-08-16 VITALS
HEART RATE: 75 BPM | HEIGHT: 70 IN | BODY MASS INDEX: 25.62 KG/M2 | DIASTOLIC BLOOD PRESSURE: 79 MMHG | SYSTOLIC BLOOD PRESSURE: 128 MMHG | WEIGHT: 179 LBS

## 2024-08-16 DIAGNOSIS — M79.674 PAIN IN RIGHT TOE(S): ICD-10-CM

## 2024-08-16 DIAGNOSIS — S92.424A CLOSED NONDISPLACED FRACTURE OF DISTAL PHALANX OF RIGHT GREAT TOE, INITIAL ENCOUNTER: Primary | ICD-10-CM

## 2024-08-16 PROCEDURE — 99024 POSTOP FOLLOW-UP VISIT: CPT | Performed by: PODIATRIST

## 2024-08-16 NOTE — PROGRESS NOTES
Assessment/Plan:   Continue with Coban wrap for management of edema right great toe.  May return to sneaker in 2 weeks as tolerated with gradual return to activities.  If any increased swelling or pain/discomfort is experienced patient instructed to call immediately.  Recommend follow-up in approximately 2 months.       Diagnoses and all orders for this visit:    Closed nondisplaced fracture of distal phalanx of right great toe, initial encounter    Pain in right toe(s)          Subjective:     Patient ID: Naa Harris is a 69 y.o. female.    HPI    Review of Systems      Objective:     Physical Exam  Cardiovascular:      Rate and Rhythm: Normal rate.      Pulses: Normal pulses.   Pulmonary:      Effort: Pulmonary effort is normal.   Musculoskeletal:         General: Signs of injury present. No swelling or tenderness.      Comments: No pain with palpation right great toe.  Diminished edema noted.  Dried blood and separation of nail bed appears to have adhered and is no longer draining nor any signs of infection.   Neurological:      General: No focal deficit present.      Mental Status: She is alert.   Psychiatric:         Mood and Affect: Mood normal.

## 2024-08-23 ENCOUNTER — TELEPHONE (OUTPATIENT)
Dept: PULMONOLOGY | Facility: CLINIC | Age: 69
End: 2024-08-23

## 2024-10-02 DIAGNOSIS — E03.9 HYPOTHYROIDISM, UNSPECIFIED TYPE: ICD-10-CM

## 2024-10-02 RX ORDER — LEVOTHYROXINE SODIUM 50 UG/1
50 TABLET ORAL
Qty: 90 TABLET | Refills: 1 | Status: SHIPPED | OUTPATIENT
Start: 2024-10-02

## 2024-10-07 ENCOUNTER — OFFICE VISIT (OUTPATIENT)
Dept: FAMILY MEDICINE CLINIC | Facility: CLINIC | Age: 69
End: 2024-10-07
Payer: MEDICARE

## 2024-10-07 VITALS
HEART RATE: 70 BPM | BODY MASS INDEX: 25.71 KG/M2 | DIASTOLIC BLOOD PRESSURE: 72 MMHG | TEMPERATURE: 97.3 F | RESPIRATION RATE: 14 BRPM | OXYGEN SATURATION: 96 % | WEIGHT: 179.6 LBS | SYSTOLIC BLOOD PRESSURE: 120 MMHG | HEIGHT: 70 IN

## 2024-10-07 DIAGNOSIS — J30.9 ALLERGIC RHINITIS, UNSPECIFIED SEASONALITY, UNSPECIFIED TRIGGER: ICD-10-CM

## 2024-10-07 DIAGNOSIS — H61.21 IMPACTED CERUMEN OF RIGHT EAR: Primary | ICD-10-CM

## 2024-10-07 PROCEDURE — 69210 REMOVE IMPACTED EAR WAX UNI: CPT | Performed by: NURSE PRACTITIONER

## 2024-10-07 PROCEDURE — 99214 OFFICE O/P EST MOD 30 MIN: CPT | Performed by: NURSE PRACTITIONER

## 2024-10-07 NOTE — PROGRESS NOTES
"Ambulatory Visit  Name: Naa Harris      : 1955      MRN: [id number]  Encounter Provider: ASHLIE Padilla  Encounter Date: 10/7/2024   Encounter department: [facility] PRIMARY CARE    Assessment & Plan  Impacted cerumen of right ear  I did advise the patient to continue using Debrox in the right ear twice daily as directed.  ENT referral was also placed for further follow-up regarding the remaining impacted cerumen of the right ear canal.  Orders:    Ambulatory Referral to Otolaryngology; Future    Allergic rhinitis, unspecified seasonality, unspecified trigger  Well-controlled on current regimen.            History of Present Illness     Right ear congestion: Patient reports feeling as though her right ear has been \"blocked\" over the past week.  She does have a history of impacted cerumen of her bilateral ears in the past.  She has been using Debrox daily over the past week but this has not resolved her symptoms.    Allergic rhinitis: Patient is current managed on Flonase and Singulair.    Ear Fullness   Pertinent negatives include no abdominal pain, coughing, diarrhea, headaches, rash, sore throat or vomiting.         Review of Systems   Constitutional:  Negative for appetite change, chills and fever.   HENT:  Negative for ear pain and sore throat.    Eyes:  Negative for pain and visual disturbance.   Respiratory:  Negative for cough, chest tightness and shortness of breath.    Cardiovascular:  Negative for chest pain, palpitations and leg swelling.   Gastrointestinal:  Negative for abdominal pain, constipation, diarrhea, nausea and vomiting.   Endocrine: Negative for cold intolerance and heat intolerance.   Genitourinary:  Negative for decreased urine volume, difficulty urinating, dysuria and hematuria.   Musculoskeletal:  Negative for arthralgias, back pain and myalgias.   Skin:  Negative for color change, rash and wound.   Allergic/Immunologic: Positive for environmental allergies. " "  Neurological:  Negative for dizziness, seizures, syncope, light-headedness and headaches.   Hematological:  Negative for adenopathy.   Psychiatric/Behavioral:  Negative for confusion, dysphoric mood and sleep disturbance. The patient is not nervous/anxious.    All other systems reviewed and are negative.          Objective     /72 (BP Location: Right arm, Patient Position: Sitting, Cuff Size: Adult)   Pulse 70   Temp (!) 97.3 °F (36.3 °C) (Temporal)   Resp 14   Ht 5' 10\" (1.778 m)   Wt 81.5 kg (179 lb 9.6 oz)   SpO2 96%   BMI 25.77 kg/m²     Physical Exam  Vitals and nursing note reviewed.   Constitutional:       General: She is not in acute distress.     Appearance: Normal appearance. She is well-developed. She is not ill-appearing.   HENT:      Head: Normocephalic.      Right Ear: Hearing normal. There is impacted cerumen.      Left Ear: Hearing and tympanic membrane normal. There is impacted cerumen.      Ears:      Comments: After impacted cerumen was removed from the left ear canal the left TM was visualized and noted to be normal.  I was unsuccessful at removing cerumen from the right ear canal.  Eyes:      Conjunctiva/sclera: Conjunctivae normal.   Cardiovascular:      Rate and Rhythm: Normal rate and regular rhythm.      Pulses: Normal pulses.           Carotid pulses are 2+ on the right side and 2+ on the left side.       Radial pulses are 2+ on the right side and 2+ on the left side.        Posterior tibial pulses are 2+ on the right side and 2+ on the left side.      Heart sounds: Normal heart sounds. No murmur heard.  Pulmonary:      Effort: Pulmonary effort is normal. No respiratory distress.      Breath sounds: Normal breath sounds. No decreased breath sounds, wheezing, rhonchi or rales.   Abdominal:      General: Abdomen is flat. Bowel sounds are normal. There is no distension.      Palpations: Abdomen is soft.      Tenderness: There is no abdominal tenderness. There is no guarding. " "  Musculoskeletal:         General: No swelling. Normal range of motion.      Cervical back: Normal range of motion and neck supple.      Right lower leg: No edema.      Left lower leg: No edema.   Skin:     General: Skin is warm and dry.      Capillary Refill: Capillary refill takes less than 2 seconds.   Neurological:      General: No focal deficit present.      Mental Status: She is alert and oriented to person, place, and time.   Psychiatric:         Mood and Affect: Mood normal.         Behavior: Behavior normal.         Thought Content: Thought content normal.         Judgment: Judgment normal.     Ear cerumen removal    Date/Time: 10/7/2024 12:20 PM    Performed by: ASHLIE Padilla  Authorized by: ASHLIE Padilla  Universal Protocol:  procedure performed by consultantConsent: Verbal consent obtained. Written consent not obtained.  Risks and benefits: risks, benefits and alternatives were discussed  Consent given by: patient  Time out: Immediately prior to procedure a \"time out\" was called to verify the correct patient, procedure, equipment, support staff and site/side marked as required.  Patient understanding: patient states understanding of the procedure being performed  Patient consent: the patient's understanding of the procedure matches consent given  Procedure consent: procedure consent matches procedure scheduled  Patient identity confirmed: verbally with patient    Patient location:  Clinic  Procedure details:     Local anesthetic:  None    Location:  L ear and R ear    Procedure type: irrigation with instrumentation      Instrumentation: curette      Approach:  External  Post-procedure details:     Complication:  None    Hearing quality:  Improved    Patient tolerance of procedure:  Tolerated well, no immediate complications  Comments:      Impacted cerumen was successfully removed from the left ear canal however, I was unsuccessful at removing the cerumen from the right ear canal.  " Patient did tolerate the procedure well and did not note any complications.

## 2024-10-07 NOTE — ASSESSMENT & PLAN NOTE
I did advise the patient to continue using Debrox in the right ear twice daily as directed.  ENT referral was also placed for further follow-up regarding the remaining impacted cerumen of the right ear canal.  Orders:    Ambulatory Referral to Otolaryngology; Future

## 2024-10-17 ENCOUNTER — OFFICE VISIT (OUTPATIENT)
Dept: PODIATRY | Facility: CLINIC | Age: 69
End: 2024-10-17

## 2024-10-17 VITALS
DIASTOLIC BLOOD PRESSURE: 76 MMHG | WEIGHT: 179 LBS | HEIGHT: 70 IN | SYSTOLIC BLOOD PRESSURE: 124 MMHG | HEART RATE: 76 BPM | BODY MASS INDEX: 25.62 KG/M2

## 2024-10-17 DIAGNOSIS — S92.424A CLOSED NONDISPLACED FRACTURE OF DISTAL PHALANX OF RIGHT GREAT TOE, INITIAL ENCOUNTER: Primary | ICD-10-CM

## 2024-10-17 DIAGNOSIS — M79.674 PAIN IN RIGHT TOE(S): ICD-10-CM

## 2024-10-17 PROCEDURE — 99024 POSTOP FOLLOW-UP VISIT: CPT | Performed by: PODIATRIST

## 2024-10-21 NOTE — PROGRESS NOTES
Assessment/Plan:   Continue with Coban wrap for management of edema right great toe.  May return to sneaker in 2 weeks as tolerated with gradual return to activities.  If any increased swelling or pain/discomfort is experienced patient instructed to call immediately.  Recommend follow-up in approximately 2 months.       Diagnoses and all orders for this visit:    Closed nondisplaced fracture of distal phalanx of right great toe, initial encounter    Pain in right toe(s)          Subjective:     Patient ID: Naa Harris is a 69 y.o. female.    10/17/2024: 69-year-old female seen today for follow-up fracture care right great toe.  Reports she is making good progress and is not having any pain and does not have any more swelling.  Secondary concern of some pain and discomfort in her left midfoot.        Review of Systems   Constitutional: Negative.    HENT: Negative.     Eyes: Negative.    Respiratory: Negative.     Cardiovascular: Negative.    Gastrointestinal: Negative.    Endocrine: Negative.    Genitourinary: Negative.    Musculoskeletal:  Positive for arthralgias (Left midfoot).        Healing fracture right great toe   Skin: Negative.    Allergic/Immunologic: Negative.    Neurological: Negative.    Hematological: Negative.    Psychiatric/Behavioral: Negative.           Objective:     Physical Exam  Cardiovascular:      Rate and Rhythm: Normal rate.      Pulses: Normal pulses.   Pulmonary:      Effort: Pulmonary effort is normal.   Musculoskeletal:         General: Signs of injury present. No swelling or tenderness.      Comments: No pain with palpation right great toe.  Diminished edema noted.  No pain with range of motion right great toe.    Mild tenderness localized to left midfoot.  Consistent with mild midfoot osteoarthritis.   Skin:     General: Skin is warm and dry.      Capillary Refill: Capillary refill takes 2 to 3 seconds.   Neurological:      General: No focal deficit present.      Mental Status: She  is alert.   Psychiatric:         Mood and Affect: Mood normal.

## 2024-11-06 PROBLEM — H61.21 IMPACTED CERUMEN OF RIGHT EAR: Status: RESOLVED | Noted: 2024-10-07 | Resolved: 2024-11-06

## 2024-11-08 DIAGNOSIS — J30.9 ALLERGIC RHINITIS: ICD-10-CM

## 2024-11-08 RX ORDER — MONTELUKAST SODIUM 10 MG/1
TABLET ORAL
Qty: 90 TABLET | Refills: 1 | Status: SHIPPED | OUTPATIENT
Start: 2024-11-08

## 2024-11-14 PROCEDURE — 88305 TISSUE EXAM BY PATHOLOGIST: CPT | Performed by: PATHOLOGY

## 2024-11-21 PROCEDURE — 88305 TISSUE EXAM BY PATHOLOGIST: CPT | Performed by: PATHOLOGY

## 2024-12-27 ENCOUNTER — OFFICE VISIT (OUTPATIENT)
Dept: PULMONOLOGY | Facility: CLINIC | Age: 69
End: 2024-12-27
Payer: MEDICARE

## 2024-12-27 VITALS
SYSTOLIC BLOOD PRESSURE: 138 MMHG | HEIGHT: 70 IN | HEART RATE: 74 BPM | DIASTOLIC BLOOD PRESSURE: 80 MMHG | WEIGHT: 182 LBS | OXYGEN SATURATION: 97 % | TEMPERATURE: 97.6 F | BODY MASS INDEX: 26.05 KG/M2

## 2024-12-27 DIAGNOSIS — J44.9 CHRONIC OBSTRUCTIVE PULMONARY DISEASE, UNSPECIFIED COPD TYPE (HCC): Primary | ICD-10-CM

## 2024-12-27 DIAGNOSIS — Z23 ENCOUNTER FOR IMMUNIZATION: ICD-10-CM

## 2024-12-27 DIAGNOSIS — J30.9 ALLERGIC RHINITIS, UNSPECIFIED SEASONALITY, UNSPECIFIED TRIGGER: ICD-10-CM

## 2024-12-27 PROCEDURE — 90662 IIV NO PRSV INCREASED AG IM: CPT

## 2024-12-27 PROCEDURE — 99214 OFFICE O/P EST MOD 30 MIN: CPT | Performed by: INTERNAL MEDICINE

## 2024-12-27 PROCEDURE — G0008 ADMIN INFLUENZA VIRUS VAC: HCPCS

## 2024-12-27 NOTE — PROGRESS NOTES
Office Progress Note - Pulmonary    Orpha Steven 69 y.o. female MRN: [id number]    Encounter: [id number]      Assessment:  Chronic obstructive pulmonary disease.  Allergic rhinitis.  Encounter for immunization.    Plan:   Flu vaccine.    Symbicort 160/4.5, 2 inhalations twice a day.  Spiriva once a day.  Albuterol rescue inhaler 2 inhalations 4 times a day as needed.  Ipratropium nasal spray 2 sprays to each nostril 3 times a day as needed.  Fluticasone nasal spray 2 sprays to each nostril once a day.  Montelukast 10 mg once a day.  Follow-up in 6 months.    Discussion:   The patient's COPD is in remission.  I have maintained her on the Symbicort 160/4.5, 2 inhalations twice a day and Spiriva once a day.  She will use the albuterol rescue inhaler 2 elations 4 times a day as needed.  Her allergic rhinitis is well treated.  I have maintained her on the fluticasone nasal spray 2 sprays to each nostril once a day and ipratropium nasal spray 2 sprays to each nostril 3 times a day as needed.  I have continued the montelukast 10 mg once a day.  We gave her the influenza vaccine for the season.  I will see her in 6 months.      Subjective:   The patient is here for a follow-up visit.  She denies any significant cough, wheezing or sputum production.  No nocturnal symptoms.  She is taking Symbicort 160/4.5, 2 inhalations twice a day and Spiriva once a day.  She rarely needs to use her rescue inhaler.  She denies nasal congestion or postnasal dripping.  She is on fluticasone nasal spray 2 sprays to each nostril once a day and ipratropium nasal spray.  She is also taking 10 mg of montelukast daily.    Review of systems:  A 12 point system review is done and aside from what is stated above the rest of the review of systems is negative.      Family history and social history are reviewed.    Medications list is reviewed.      Vitals: Blood pressure 138/80, pulse 74, temperature 97.6 °F (36.4 °C), temperature source Tympanic  "Core, height 5' 10\" (1.778 m), weight 82.6 kg (182 lb), SpO2 97%, not currently breastfeeding.,     Physical Exam  Gen: Awake, alert, oriented x 3, no acute distress  HEENT: Mucous membranes moist, no oral lesions, no thrush  NECK: No accessory muscle use, JVP not elevated  Cardiac: Regular, single S1, single S2, no murmurs, no rubs, no gallops  Lungs: Diminished breath sounds.  No wheezing or rhonchi.  Abdomen: normoactive bowel sounds, soft nontender, nondistended, no rebound or rigidity, no guarding  Extremities: no cyanosis, no clubbing, no edema  Neuro:  Grossly nonfocal.  Skin:  No rash.    Lab Results   Component Value Date    WBC 5.99 07/08/2024    HGB 14.0 07/08/2024    HCT 44.2 07/08/2024    MCV 93 07/08/2024     07/08/2024     Lab Results   Component Value Date    SODIUM 140 07/08/2024    K 4.4 07/08/2024     07/08/2024    CO2 26 07/08/2024    BUN 18 07/08/2024    CREATININE 0.79 07/08/2024    CALCIUM 9.5 07/08/2024       "

## 2025-01-05 DIAGNOSIS — J44.9 CHRONIC OBSTRUCTIVE PULMONARY DISEASE, UNSPECIFIED COPD TYPE (HCC): ICD-10-CM

## 2025-01-06 RX ORDER — BUDESONIDE AND FORMOTEROL FUMARATE DIHYDRATE 160; 4.5 UG/1; UG/1
2 AEROSOL RESPIRATORY (INHALATION) 2 TIMES DAILY
Qty: 10.2 G | Refills: 5 | Status: SHIPPED | OUTPATIENT
Start: 2025-01-06

## 2025-01-07 ENCOUNTER — APPOINTMENT (OUTPATIENT)
Dept: LAB | Facility: CLINIC | Age: 70
End: 2025-01-07
Payer: MEDICARE

## 2025-01-07 DIAGNOSIS — E78.2 MIXED HYPERLIPIDEMIA: ICD-10-CM

## 2025-01-07 DIAGNOSIS — N32.81 OAB (OVERACTIVE BLADDER): ICD-10-CM

## 2025-01-07 DIAGNOSIS — M81.0 AGE-RELATED OSTEOPOROSIS WITHOUT CURRENT PATHOLOGICAL FRACTURE: ICD-10-CM

## 2025-01-07 DIAGNOSIS — L71.9 ROSACEA: ICD-10-CM

## 2025-01-07 DIAGNOSIS — E03.9 ACQUIRED HYPOTHYROIDISM: ICD-10-CM

## 2025-01-07 DIAGNOSIS — M15.0 PRIMARY OSTEOARTHRITIS INVOLVING MULTIPLE JOINTS: ICD-10-CM

## 2025-01-07 DIAGNOSIS — J44.9 CHRONIC OBSTRUCTIVE PULMONARY DISEASE, UNSPECIFIED COPD TYPE (HCC): ICD-10-CM

## 2025-01-07 DIAGNOSIS — Z80.3 FAMILY HISTORY OF BREAST CANCER IN MOTHER: ICD-10-CM

## 2025-01-07 LAB
25(OH)D3 SERPL-MCNC: 37.8 NG/ML (ref 30–100)
ALBUMIN SERPL BCG-MCNC: 4.2 G/DL (ref 3.5–5)
ALP SERPL-CCNC: 44 U/L (ref 34–104)
ALT SERPL W P-5'-P-CCNC: 20 U/L (ref 7–52)
ANION GAP SERPL CALCULATED.3IONS-SCNC: 8 MMOL/L (ref 4–13)
AST SERPL W P-5'-P-CCNC: 22 U/L (ref 13–39)
BACTERIA UR QL AUTO: ABNORMAL /HPF
BILIRUB SERPL-MCNC: 0.5 MG/DL (ref 0.2–1)
BILIRUB UR QL STRIP: NEGATIVE
BUN SERPL-MCNC: 15 MG/DL (ref 5–25)
CALCIUM SERPL-MCNC: 9.9 MG/DL (ref 8.4–10.2)
CHLORIDE SERPL-SCNC: 107 MMOL/L (ref 96–108)
CHOLEST SERPL-MCNC: 216 MG/DL (ref ?–200)
CLARITY UR: CLEAR
CO2 SERPL-SCNC: 25 MMOL/L (ref 21–32)
COLOR UR: YELLOW
CREAT SERPL-MCNC: 0.75 MG/DL (ref 0.6–1.3)
ERYTHROCYTE [DISTWIDTH] IN BLOOD BY AUTOMATED COUNT: 13 % (ref 11.6–15.1)
GFR SERPL CREATININE-BSD FRML MDRD: 80 ML/MIN/1.73SQ M
GLUCOSE P FAST SERPL-MCNC: 95 MG/DL (ref 65–99)
GLUCOSE UR STRIP-MCNC: NEGATIVE MG/DL
HCT VFR BLD AUTO: 44.7 % (ref 34.8–46.1)
HDLC SERPL-MCNC: 86 MG/DL
HGB BLD-MCNC: 14.6 G/DL (ref 11.5–15.4)
HGB UR QL STRIP.AUTO: NEGATIVE
KETONES UR STRIP-MCNC: NEGATIVE MG/DL
LDLC SERPL CALC-MCNC: 107 MG/DL (ref 0–100)
LEUKOCYTE ESTERASE UR QL STRIP: ABNORMAL
MCH RBC QN AUTO: 30.7 PG (ref 26.8–34.3)
MCHC RBC AUTO-ENTMCNC: 32.7 G/DL (ref 31.4–37.4)
MCV RBC AUTO: 94 FL (ref 82–98)
MUCOUS THREADS UR QL AUTO: ABNORMAL
NITRITE UR QL STRIP: NEGATIVE
NON-SQ EPI CELLS URNS QL MICRO: ABNORMAL /HPF
PH UR STRIP.AUTO: 6 [PH]
PLATELET # BLD AUTO: 292 THOUSANDS/UL (ref 149–390)
PMV BLD AUTO: 10.3 FL (ref 8.9–12.7)
POTASSIUM SERPL-SCNC: 4.5 MMOL/L (ref 3.5–5.3)
PROT SERPL-MCNC: 7.5 G/DL (ref 6.4–8.4)
PROT UR STRIP-MCNC: ABNORMAL MG/DL
RBC # BLD AUTO: 4.75 MILLION/UL (ref 3.81–5.12)
RBC #/AREA URNS AUTO: ABNORMAL /HPF
SODIUM SERPL-SCNC: 140 MMOL/L (ref 135–147)
SP GR UR STRIP.AUTO: 1.02 (ref 1–1.03)
TRIGL SERPL-MCNC: 114 MG/DL (ref ?–150)
TSH SERPL DL<=0.05 MIU/L-ACNC: 4.06 UIU/ML (ref 0.45–4.5)
UROBILINOGEN UR STRIP-ACNC: <2 MG/DL
WBC # BLD AUTO: 5.99 THOUSAND/UL (ref 4.31–10.16)
WBC #/AREA URNS AUTO: ABNORMAL /HPF

## 2025-01-07 PROCEDURE — 81001 URINALYSIS AUTO W/SCOPE: CPT

## 2025-01-07 PROCEDURE — 85027 COMPLETE CBC AUTOMATED: CPT

## 2025-01-07 PROCEDURE — 84443 ASSAY THYROID STIM HORMONE: CPT

## 2025-01-07 PROCEDURE — 36415 COLL VENOUS BLD VENIPUNCTURE: CPT

## 2025-01-07 PROCEDURE — 80053 COMPREHEN METABOLIC PANEL: CPT

## 2025-01-07 PROCEDURE — 80061 LIPID PANEL: CPT

## 2025-01-07 PROCEDURE — 82306 VITAMIN D 25 HYDROXY: CPT

## 2025-01-16 ENCOUNTER — OFFICE VISIT (OUTPATIENT)
Dept: FAMILY MEDICINE CLINIC | Facility: CLINIC | Age: 70
End: 2025-01-16
Payer: MEDICARE

## 2025-01-16 VITALS
BODY MASS INDEX: 25.65 KG/M2 | WEIGHT: 179.2 LBS | SYSTOLIC BLOOD PRESSURE: 130 MMHG | OXYGEN SATURATION: 97 % | DIASTOLIC BLOOD PRESSURE: 76 MMHG | HEART RATE: 78 BPM | HEIGHT: 70 IN

## 2025-01-16 DIAGNOSIS — Z80.3 FAMILY HISTORY OF BREAST CANCER IN MOTHER: Primary | ICD-10-CM

## 2025-01-16 DIAGNOSIS — Z12.11 SCREENING FOR COLON CANCER: ICD-10-CM

## 2025-01-16 DIAGNOSIS — R06.09 DOE (DYSPNEA ON EXERTION): ICD-10-CM

## 2025-01-16 DIAGNOSIS — J44.9 CHRONIC OBSTRUCTIVE PULMONARY DISEASE, UNSPECIFIED COPD TYPE (HCC): ICD-10-CM

## 2025-01-16 DIAGNOSIS — Z00.00 HEALTHCARE MAINTENANCE: ICD-10-CM

## 2025-01-16 DIAGNOSIS — E78.2 MIXED HYPERLIPIDEMIA: ICD-10-CM

## 2025-01-16 DIAGNOSIS — E03.9 ACQUIRED HYPOTHYROIDISM: ICD-10-CM

## 2025-01-16 DIAGNOSIS — M81.0 AGE-RELATED OSTEOPOROSIS WITHOUT CURRENT PATHOLOGICAL FRACTURE: ICD-10-CM

## 2025-01-16 DIAGNOSIS — Z98.890 S/P COLONOSCOPY: ICD-10-CM

## 2025-01-16 DIAGNOSIS — Z12.31 SCREENING MAMMOGRAM FOR BREAST CANCER: ICD-10-CM

## 2025-01-16 DIAGNOSIS — R53.83 OTHER FATIGUE: ICD-10-CM

## 2025-01-16 DIAGNOSIS — N32.81 OAB (OVERACTIVE BLADDER): ICD-10-CM

## 2025-01-16 PROBLEM — S99.921A INJURY OF RIGHT GREAT TOE, INITIAL ENCOUNTER: Status: RESOLVED | Noted: 2024-08-02 | Resolved: 2025-01-16

## 2025-01-16 PROCEDURE — G0439 PPPS, SUBSEQ VISIT: HCPCS | Performed by: FAMILY MEDICINE

## 2025-01-16 PROCEDURE — 99214 OFFICE O/P EST MOD 30 MIN: CPT | Performed by: FAMILY MEDICINE

## 2025-01-16 NOTE — ASSESSMENT & PLAN NOTE
Stable continue present therapy  Orders:  •  CBC; Future  •  Comprehensive metabolic panel; Future  •  Lipid Panel with Direct LDL reflex; Future  •  TSH, 3rd generation with Free T4 reflex; Future  •  UA (URINE) with reflex to Scope; Future  •  Vitamin D 25 hydroxy; Future

## 2025-01-16 NOTE — ASSESSMENT & PLAN NOTE
Medication was declined  Orders:  •  CBC; Future  •  Comprehensive metabolic panel; Future  •  Lipid Panel with Direct LDL reflex; Future  •  TSH, 3rd generation with Free T4 reflex; Future  •  UA (URINE) with reflex to Scope; Future  •  Vitamin D 25 hydroxy; Future

## 2025-01-16 NOTE — ASSESSMENT & PLAN NOTE
Refer to GI for colonoscopy patient is due  Orders:  •  Ambulatory Referral to Gastroenterology; Future  •  CBC; Future  •  Comprehensive metabolic panel; Future  •  Lipid Panel with Direct LDL reflex; Future  •  TSH, 3rd generation with Free T4 reflex; Future  •  UA (URINE) with reflex to Scope; Future  •  Vitamin D 25 hydroxy; Future

## 2025-01-16 NOTE — ASSESSMENT & PLAN NOTE
UA is normal  Orders:  •  CBC; Future  •  Comprehensive metabolic panel; Future  •  Lipid Panel with Direct LDL reflex; Future  •  TSH, 3rd generation with Free T4 reflex; Future  •  UA (URINE) with reflex to Scope; Future  •  Vitamin D 25 hydroxy; Future

## 2025-01-16 NOTE — ASSESSMENT & PLAN NOTE
Mammogram ordered  Orders:  •  Mammo screening bilateral w 3d and cad; Future  •  CBC; Future  •  Comprehensive metabolic panel; Future  •  Lipid Panel with Direct LDL reflex; Future  •  TSH, 3rd generation with Free T4 reflex; Future  •  UA (URINE) with reflex to Scope; Future  •  Vitamin D 25 hydroxy; Future

## 2025-01-16 NOTE — ASSESSMENT & PLAN NOTE
Stable follows with pulmonologist  Orders:  •  CBC; Future  •  Comprehensive metabolic panel; Future  •  Lipid Panel with Direct LDL reflex; Future  •  TSH, 3rd generation with Free T4 reflex; Future  •  UA (URINE) with reflex to Scope; Future  •  Vitamin D 25 hydroxy; Future

## 2025-01-16 NOTE — ASSESSMENT & PLAN NOTE
Stable on Crestor  Orders:  •  CBC; Future  •  Comprehensive metabolic panel; Future  •  Lipid Panel with Direct LDL reflex; Future  •  TSH, 3rd generation with Free T4 reflex; Future  •  UA (URINE) with reflex to Scope; Future  •  Vitamin D 25 hydroxy; Future   [facility]    Report of Consultation    Charity Briones Patient Status:  Emergency    1976 MRN [id number]   Location [address] Attending Roberto Rodríguez MD   Hosp Day # 0 PCP Renard Iverson MD     Date Performed by Iva Rodríguez MD at [facility] OR   • CYSTOSCOPYALEJANDRO  2013, 2017   • D & C  1994   •  PUNCTURE ASPIRATION BREAST CYST Right 2018   [facility]/04/2011    x 2   • LAPAROSCOPIC CHOLECYSTECTOMY  2006 exchange to both lung fields with no wheezes or rhonchi    Heart : S1 s2 RRR no g/M   abd : soft and benign   Ext : no edema , no calf tenderness  A,A,Ox 3  No focal deficit        Results:     Laboratory Data:  Lab Results   Component Value Date    WBC 11 in ER with improvement and reexpansion of the right lung    Plan :   Chest tube management  Chest tube to suction  Pain management  Complete smoking cessation  Follow-up daily chest x-ray  Other order and management based on daily progress .     2–Smoker wi

## 2025-01-16 NOTE — PATIENT INSTRUCTIONS
Complete stress echocardiogram to evaluate heart function  Follow all specialist further instructions  Complete mammogram for breast cancer screening especially since you are mother had breast cancer you are at increased risk  Complete colonoscopy for colon cancer screening  Return in 6 months for office visit and blood work sooner if needed

## 2025-01-16 NOTE — PROGRESS NOTES
Name: Naa Harris      : 1955      MRN: [id number]  Encounter Provider: Sánchez Pryor DO  Encounter Date: 2025   Encounter department: [facility]  1.  Family history of breast cancer in mother/screening breast cancer, mammography ordered  2.  Screening colon cancer last colonoscopy  refer to GI for colonoscopy  3.  Fatigue  4.  Mild ALSTON/COPD  Has seen pulmonology  Will check stress echo  Metabolically patient is normal explained to patient this may be age-related  5.  Osteoporosis medications been declined  6.  Hypothyroidism, stable continue present therapy  7.  Hyperlipidemia, stable on Crestor 5 mg daily  8.  OAB UA is normal  9.  Healthcare maintenance Medicare AWV completed COVID-vaccine refused  I recommend patient obtain RSV, Adacel, and shingles vaccination at local pharmacy  10.  Return in 6 months for office visit and blood work sooner if needed    Assessment & Plan  Family history of breast cancer in mother  Mammogram ordered  Orders:  •  Mammo screening bilateral w 3d and cad; Future  •  CBC; Future  •  Comprehensive metabolic panel; Future  •  Lipid Panel with Direct LDL reflex; Future  •  TSH, 3rd generation with Free T4 reflex; Future  •  UA (URINE) with reflex to Scope; Future  •  Vitamin D 25 hydroxy; Future    Chronic obstructive pulmonary disease, unspecified COPD type (HCC)  Stable follows with pulmonologist  Orders:  •  CBC; Future  •  Comprehensive metabolic panel; Future  •  Lipid Panel with Direct LDL reflex; Future  •  TSH, 3rd generation with Free T4 reflex; Future  •  UA (URINE) with reflex to Scope; Future  •  Vitamin D 25 hydroxy; Future    S/P colonoscopy  Refer to GI for colonoscopy patient is due  Orders:  •  Ambulatory Referral to Gastroenterology; Future  •  CBC; Future  •  Comprehensive metabolic panel; Future  •  Lipid Panel with Direct LDL reflex; Future  •  TSH, 3rd generation with Free T4 reflex; Future  •  UA (URINE) with reflex to  Scope; Future  •  Vitamin D 25 hydroxy; Future    Screening for colon cancer    Orders:  •  Ambulatory Referral to Gastroenterology; Future  •  CBC; Future  •  Comprehensive metabolic panel; Future  •  Lipid Panel with Direct LDL reflex; Future  •  TSH, 3rd generation with Free T4 reflex; Future  •  UA (URINE) with reflex to Scope; Future  •  Vitamin D 25 hydroxy; Future    Screening mammogram for breast cancer    Orders:  •  Mammo screening bilateral w 3d and cad; Future  •  CBC; Future  •  Comprehensive metabolic panel; Future  •  Lipid Panel with Direct LDL reflex; Future  •  TSH, 3rd generation with Free T4 reflex; Future  •  UA (URINE) with reflex to Scope; Future  •  Vitamin D 25 hydroxy; Future    Other fatigue    Orders:  •  Echo stress test, exercise; Future  •  CBC; Future  •  Comprehensive metabolic panel; Future  •  Lipid Panel with Direct LDL reflex; Future  •  TSH, 3rd generation with Free T4 reflex; Future  •  UA (URINE) with reflex to Scope; Future  •  Vitamin D 25 hydroxy; Future    ALSTON (dyspnea on exertion)    Orders:  •  Echo stress test, exercise; Future  •  CBC; Future  •  Comprehensive metabolic panel; Future  •  Lipid Panel with Direct LDL reflex; Future  •  TSH, 3rd generation with Free T4 reflex; Future  •  UA (URINE) with reflex to Scope; Future  •  Vitamin D 25 hydroxy; Future    Acquired hypothyroidism  Stable continue present therapy  Orders:  •  CBC; Future  •  Comprehensive metabolic panel; Future  •  Lipid Panel with Direct LDL reflex; Future  •  TSH, 3rd generation with Free T4 reflex; Future  •  UA (URINE) with reflex to Scope; Future  •  Vitamin D 25 hydroxy; Future    Age-related osteoporosis without current pathological fracture  Medication was declined  Orders:  •  CBC; Future  •  Comprehensive metabolic panel; Future  •  Lipid Panel with Direct LDL reflex; Future  •  TSH, 3rd generation with Free T4 reflex; Future  •  UA (URINE) with reflex to Scope; Future  •  Vitamin D 25  hydroxy; Future    OAB (overactive bladder)  UA is normal  Orders:  •  CBC; Future  •  Comprehensive metabolic panel; Future  •  Lipid Panel with Direct LDL reflex; Future  •  TSH, 3rd generation with Free T4 reflex; Future  •  UA (URINE) with reflex to Scope; Future  •  Vitamin D 25 hydroxy; Future    Mixed hyperlipidemia  Stable on Crestor  Orders:  •  CBC; Future  •  Comprehensive metabolic panel; Future  •  Lipid Panel with Direct LDL reflex; Future  •  TSH, 3rd generation with Free T4 reflex; Future  •  UA (URINE) with reflex to Scope; Future  •  Vitamin D 25 hydroxy; Future    Healthcare maintenance  Medicare AWV completed  COVID vaccine refused  Recommend patient obtain RSV, shingles, Adacel at her local pharmacy         Depression Screening and Follow-up Plan: Patient was screened for depression during today's encounter. They screened negative with a PHQ-2 score of 1.    Urinary Incontinence Plan of Care: counseling topics discussed: use restroom every 2 hours.       Preventive health issues were discussed with patient, and age appropriate screening tests were ordered as noted in patient's After Visit Summary. Personalized health advice and appropriate referrals for health education or preventive services given if needed, as noted in patient's After Visit Summary.    History of Present Illness     Patient doing well.  However she noted she gets fatigued towards the end of the day she did go on a trip and noticed with walking she was mildly fatigued and short of breath by the end of the day.  Patient was seen by her pulmonologist who does not feel it is a pulmonary issue.  Blood work was discussed       Patient Care Team:  Sánchez Pryor DO as PCP - General (Family Medicine)  Mary Jane Salmeron MD    Review of Systems   Constitutional:         HPI   HENT: Negative.     Eyes: Negative.    Respiratory:          HPI   Cardiovascular:  Negative for chest pain, palpitations and leg swelling.    Gastrointestinal: Negative.    Endocrine: Negative.    Genitourinary: Negative.    Musculoskeletal: Negative.    Skin: Negative.    Allergic/Immunologic: Negative.    Neurological: Negative.    Hematological: Negative.    Psychiatric/Behavioral: Negative.       Medical History Reviewed by provider this encounter:  Tobacco  Allergies  Meds  Problems  Med Hx  Surg Hx  Fam Hx       Annual Wellness Visit Questionnaire   Orpha is here for her Subsequent Wellness visit.     Health Risk Assessment:   Patient rates overall health as very good. Patient feels that their physical health rating is same. Patient is very satisfied with their life. Eyesight was rated as much better. Hearing was rated as slightly better. Patient feels that their emotional and mental health rating is same. Patients states they are never, rarely angry. Patient states they are often unusually tired/fatigued. Pain experienced in the last 7 days has been none. Patient states that she has experienced no weight loss or gain in last 6 months.     Depression Screening:   PHQ-2 Score: 1      Fall Risk Screening:   In the past year, patient has experienced: no history of falling in past year      Urinary Incontinence Screening:   Patient has not leaked urine accidently in the last six months.     Home Safety:  Patient does not have trouble with stairs inside or outside of their home. Patient has working smoke alarms and has working carbon monoxide detector. Home safety hazards include: none.     Nutrition:   Current diet is Regular.     Medications:   Patient is currently taking over-the-counter supplements. OTC medications include: see medication list. Patient is able to manage medications.     Activities of Daily Living (ADLs)/Instrumental Activities of Daily Living (IADLs):   Walk and transfer into and out of bed and chair?: Yes  Dress and groom yourself?: Yes    Bathe or shower yourself?: Yes    Feed yourself? Yes  Do your laundry/housekeeping?:  Yes  Manage your money, pay your bills and track your expenses?: Yes  Make your own meals?: Yes    Do your own shopping?: Yes    Previous Hospitalizations:   Any hospitalizations or ED visits within the last 12 months?: No      Advance Care Planning:   Living will: Yes    Durable POA for healthcare: No    Advanced directive: Yes    Advanced directive counseling given: Yes    Five wishes given: No    Patient declined ACP directive: No    End of Life Decisions reviewed with patient: Yes    Provider agrees with end of life decisions: Yes      Cognitive Screening:   Provider or family/friend/caregiver concerned regarding cognition?: No    PREVENTIVE SCREENINGS      Cardiovascular Screening:    General: Screening Not Indicated and History Lipid Disorder      Diabetes Screening:     General: Screening Current      Colorectal Cancer Screening:     General: Screening Current      Breast Cancer Screening:     General: Risks and Benefits Discussed    Due for: Mammogram        Cervical Cancer Screening:    General: Screening Not Indicated      Osteoporosis Screening:    General: Screening Not Indicated and History Osteoporosis      Abdominal Aortic Aneurysm (AAA) Screening:        General: Screening Not Indicated      Lung Cancer Screening:     General: Screening Not Indicated      Hepatitis C Screening:    General: Screening Current    Screening, Brief Intervention, and Referral to Treatment (SBIRT)    Screening  Typical number of drinks in a day: 0  Typical number of drinks in a week: 0  Interpretation: Low risk drinking behavior.    Single Item Drug Screening:  How often have you used an illegal drug (including marijuana) or a prescription medication for non-medical reasons in the past year? never    Single Item Drug Screen Score: 0  Interpretation: Negative screen for possible drug use disorder    Social Drivers of Health     Financial Resource Strain: Low Risk  (1/4/2024)    Overall Financial Resource Strain (CARDIA)    •  "Difficulty of Paying Living Expenses: Not hard at all   Food Insecurity: No Food Insecurity (1/16/2025)    Hunger Vital Sign    • Worried About Running Out of Food in the Last Year: Never true    • Ran Out of Food in the Last Year: Never true   Transportation Needs: No Transportation Needs (1/16/2025)    PRAPARE - Transportation    • Lack of Transportation (Medical): No    • Lack of Transportation (Non-Medical): No   Housing Stability: Low Risk  (1/16/2025)    Housing Stability Vital Sign    • Unable to Pay for Housing in the Last Year: No    • Number of Times Moved in the Last Year: 0    • Homeless in the Last Year: No   Utilities: Not At Risk (1/16/2025)    [facility] Utilities    • Threatened with loss of utilities: No     No results found.    Objective   /76 (BP Location: Right arm, Patient Position: Sitting, Cuff Size: Standard)   Pulse 78   Ht 5' 10\" (1.778 m)   Wt 81.3 kg (179 lb 3.2 oz)   SpO2 97%   BMI 25.71 kg/m²     Physical Exam  Vitals and nursing note reviewed.   Constitutional:       Appearance: Normal appearance.   HENT:      Head: Normocephalic and atraumatic.      Mouth/Throat:      Mouth: Mucous membranes are moist.   Eyes:      General: No scleral icterus.  Neck:      Vascular: No carotid bruit.   Cardiovascular:      Rate and Rhythm: Normal rate and regular rhythm.      Heart sounds: Normal heart sounds.   Pulmonary:      Effort: Pulmonary effort is normal.      Breath sounds: Normal breath sounds.   Abdominal:      General: Bowel sounds are normal.      Palpations: Abdomen is soft.      Tenderness: There is no abdominal tenderness.   Musculoskeletal:      Cervical back: Neck supple.      Right lower leg: No edema.      Left lower leg: No edema.   Skin:     General: Skin is warm and dry.   Neurological:      General: No focal deficit present.      Mental Status: She is alert.   Psychiatric:         Mood and Affect: Mood normal.         "

## 2025-02-05 DIAGNOSIS — E78.5 HYPERLIPIDEMIA, UNSPECIFIED HYPERLIPIDEMIA TYPE: ICD-10-CM

## 2025-02-05 RX ORDER — ROSUVASTATIN CALCIUM 5 MG/1
5 TABLET, COATED ORAL DAILY
Qty: 90 TABLET | Refills: 1 | Status: SHIPPED | OUTPATIENT
Start: 2025-02-05

## 2025-02-10 ENCOUNTER — TELEPHONE (OUTPATIENT)
Dept: NON INVASIVE DIAGNOSTICS | Facility: HOSPITAL | Age: 70
End: 2025-02-10

## 2025-02-10 NOTE — TELEPHONE ENCOUNTER
Recieved message on VM from pt seeking clarification of her cardiac testing. Return call placed and message left on VM with contact info for [facility] for any further questions

## 2025-02-11 ENCOUNTER — HOSPITAL ENCOUNTER (OUTPATIENT)
Dept: NON INVASIVE DIAGNOSTICS | Facility: HOSPITAL | Age: 70
Discharge: HOME | End: 2025-02-11
Payer: MEDICARE

## 2025-02-11 VITALS
WEIGHT: 179 LBS | OXYGEN SATURATION: 98 % | HEART RATE: 80 BPM | SYSTOLIC BLOOD PRESSURE: 146 MMHG | HEIGHT: 70 IN | DIASTOLIC BLOOD PRESSURE: 88 MMHG | BODY MASS INDEX: 25.62 KG/M2

## 2025-02-11 DIAGNOSIS — R53.83 OTHER FATIGUE: ICD-10-CM

## 2025-02-11 DIAGNOSIS — R06.09 DOE (DYSPNEA ON EXERTION): ICD-10-CM

## 2025-02-11 LAB
CHEST PAIN STATEMENT: NORMAL
E WAVE DECELERATION TIME: 192 MS
E/A RATIO: 0.89
MAX DIASTOLIC BP: 84 MMHG
MAX HR PERCENT: 93 %
MAX HR: 139 BPM
MAX PREDICTED HEART RATE: 149 BPM
MV E'TISSUE VEL-LAT: 11 CM/S
MV E'TISSUE VEL-SEP: 11 CM/S
MV PEAK A VEL: 0.76 M/S
MV PEAK E VEL: 68 CM/S
MV STENOSIS PRESSURE HALF TIME: 56 MS
MV VALVE AREA P 1/2 METHOD: 3.93
PROTOCOL NAME: NORMAL
RATE PRESSURE PRODUCT: NORMAL
REASON FOR TERMINATION: NORMAL
SL CV LV EF: 60
SL CV STRESS RECOVERY BP: NORMAL MMHG
SL CV STRESS RECOVERY HR: 74 BPM
SL CV STRESS RECOVERY O2 SAT: 98 %
SL CV STRESS STAGE REACHED: 2
STRESS ANGINA INDEX: 0
STRESS BASELINE BP: NORMAL MMHG
STRESS BASELINE HR: 80 BPM
STRESS O2 SAT REST: 98 %
STRESS PEAK HR: 139 BPM
STRESS POST ESTIMATED WORKLOAD: 7 METS
STRESS POST EXERCISE DUR MIN: 6 MIN
STRESS POST EXERCISE DUR MIN: 6 MIN
STRESS POST EXERCISE DUR SEC: 0 SEC
STRESS POST EXERCISE DUR SEC: 0 SEC
STRESS POST O2 SAT PEAK: 97 %
STRESS POST PEAK BP: 190 MMHG
STRESS POST PEAK HR: 139 BPM
STRESS POST PEAK SYSTOLIC BP: 190 MMHG
TARGET HR FORMULA: NORMAL
TEST INDICATION: NORMAL
TR MAX PG: 12 MMHG
TR PEAK VELOCITY: 1.8 M/S
TRICUSPID VALVE PEAK REGURGITATION VELOCITY: 1.76 M/S

## 2025-02-11 PROCEDURE — 93350 STRESS TTE ONLY: CPT

## 2025-02-11 PROCEDURE — 93350 STRESS TTE ONLY: CPT | Performed by: INTERNAL MEDICINE

## 2025-02-12 ENCOUNTER — RESULTS FOLLOW-UP (OUTPATIENT)
Dept: FAMILY MEDICINE CLINIC | Facility: CLINIC | Age: 70
End: 2025-02-12

## 2025-03-10 ENCOUNTER — OFFICE VISIT (OUTPATIENT)
Dept: FAMILY MEDICINE CLINIC | Facility: CLINIC | Age: 70
End: 2025-03-10
Payer: MEDICARE

## 2025-03-10 VITALS
OXYGEN SATURATION: 98 % | WEIGHT: 181.4 LBS | BODY MASS INDEX: 25.97 KG/M2 | DIASTOLIC BLOOD PRESSURE: 68 MMHG | HEART RATE: 73 BPM | SYSTOLIC BLOOD PRESSURE: 128 MMHG | HEIGHT: 70 IN | TEMPERATURE: 98 F

## 2025-03-10 DIAGNOSIS — H65.03 BILATERAL ACUTE SEROUS OTITIS MEDIA, RECURRENCE NOT SPECIFIED: ICD-10-CM

## 2025-03-10 DIAGNOSIS — H69.90 DYSFUNCTION OF EUSTACHIAN TUBE, UNSPECIFIED LATERALITY: ICD-10-CM

## 2025-03-10 DIAGNOSIS — J30.9 ALLERGIC RHINITIS, UNSPECIFIED SEASONALITY, UNSPECIFIED TRIGGER: ICD-10-CM

## 2025-03-10 DIAGNOSIS — J06.9 VIRAL URI: Primary | ICD-10-CM

## 2025-03-10 PROCEDURE — G2211 COMPLEX E/M VISIT ADD ON: HCPCS | Performed by: PHYSICIAN ASSISTANT

## 2025-03-10 PROCEDURE — 99213 OFFICE O/P EST LOW 20 MIN: CPT | Performed by: PHYSICIAN ASSISTANT

## 2025-03-10 RX ORDER — IPRATROPIUM BROMIDE 21 UG/1
2 SPRAY, METERED NASAL 2 TIMES DAILY
Qty: 90 ML | Refills: 2 | Status: SHIPPED | OUTPATIENT
Start: 2025-03-10

## 2025-03-10 NOTE — ASSESSMENT & PLAN NOTE
Atrovent refilled.   Orders:  •  ipratropium (ATROVENT) 0.03 % nasal spray; 2 sprays into each nostril 2 (two) times a day

## 2025-03-10 NOTE — ASSESSMENT & PLAN NOTE
Continue all current meds including singulair, claritin.  Orders:  •  ipratropium (ATROVENT) 0.03 % nasal spray; 2 sprays into each nostril 2 (two) times a day

## 2025-03-10 NOTE — PROGRESS NOTES
"Name: Naa Harris      : 1955      MRN: [id number]  Encounter Provider: Johana Martinez PA-C  Encounter Date: 3/10/2025   Encounter department: [facility]  :  Assessment & Plan  Dysfunction of Eustachian tube, unspecified laterality  Atrovent refilled.   Orders:  •  ipratropium (ATROVENT) 0.03 % nasal spray; 2 sprays into each nostril 2 (two) times a day    Allergic rhinitis, unspecified seasonality, unspecified trigger  Continue all current meds including singulair, claritin.  Orders:  •  ipratropium (ATROVENT) 0.03 % nasal spray; 2 sprays into each nostril 2 (two) times a day    Viral URI  Symptomatic treatment, increase fluids, no antibiotic needed at this time.               History of Present Illness   Patient presents with:  Sore Throat: Started Friday - was dry. Taking vitamin C.    Had cough for two weeks and then got worse over the weekend. Today did feel better.   Flight on a trip this coming Thursday.       Review of Systems   Constitutional: Negative.    HENT:  Positive for congestion, postnasal drip, rhinorrhea and sore throat.    Eyes: Negative.    Respiratory:  Positive for cough.    Cardiovascular: Negative.    Gastrointestinal: Negative.    Endocrine: Negative.    Genitourinary: Negative.    Musculoskeletal: Negative.    Skin: Negative.    Allergic/Immunologic: Negative.    Neurological: Negative.    Hematological: Negative.    Psychiatric/Behavioral: Negative.         Objective   /68 (BP Location: Right arm, Patient Position: Sitting, Cuff Size: Standard)   Pulse 73   Temp 98 °F (36.7 °C) (Temporal)   Ht 5' 10\" (1.778 m)   Wt 82.3 kg (181 lb 6.4 oz)   SpO2 98%   BMI 26.03 kg/m²      Physical Exam  Vitals and nursing note reviewed.   Constitutional:       Appearance: Normal appearance. She is well-developed.   HENT:      Head: Normocephalic and atraumatic.      Right Ear: Hearing, tympanic membrane, ear canal and external ear normal.      Left Ear: " Hearing, tympanic membrane, ear canal and external ear normal.      Ears:      Comments: Mildly decreased light reflex dull TM's w/o erythema marek.     Nose:      Right Turbinates: Enlarged and swollen.      Left Turbinates: Enlarged and swollen.      Mouth/Throat:      Comments: Very slight posterior erythema w/o exudate  Eyes:      General: Lids are normal.      Conjunctiva/sclera: Conjunctivae normal.      Pupils: Pupils are equal, round, and reactive to light.   Cardiovascular:      Rate and Rhythm: Normal rate and regular rhythm.      Heart sounds: No murmur heard.  Pulmonary:      Effort: Pulmonary effort is normal.      Breath sounds: Normal breath sounds.   Lymphadenopathy:      Cervical: Cervical adenopathy present.      Right cervical: Superficial cervical adenopathy present.      Left cervical: Superficial cervical adenopathy present.   Skin:     General: Skin is warm and dry.   Neurological:      General: No focal deficit present.      Mental Status: She is alert.      Coordination: Coordination is intact.   Psychiatric:         Mood and Affect: Mood normal.         Behavior: Behavior normal. Behavior is cooperative.         Thought Content: Thought content normal.         Judgment: Judgment normal.

## 2025-03-30 DIAGNOSIS — E03.9 HYPOTHYROIDISM, UNSPECIFIED TYPE: ICD-10-CM

## 2025-03-31 RX ORDER — LEVOTHYROXINE SODIUM 50 UG/1
50 TABLET ORAL
Qty: 90 TABLET | Refills: 1 | Status: SHIPPED | OUTPATIENT
Start: 2025-03-31

## 2025-05-07 DIAGNOSIS — J30.9 ALLERGIC RHINITIS: ICD-10-CM

## 2025-05-07 RX ORDER — MONTELUKAST SODIUM 10 MG/1
10 TABLET ORAL DAILY
Qty: 90 TABLET | Refills: 1 | Status: SHIPPED | OUTPATIENT
Start: 2025-05-07

## 2025-05-20 ENCOUNTER — APPOINTMENT (OUTPATIENT)
Dept: RADIOLOGY | Facility: MEDICAL CENTER | Age: 70
End: 2025-05-20
Payer: MEDICARE

## 2025-05-20 ENCOUNTER — OFFICE VISIT (OUTPATIENT)
Dept: FAMILY MEDICINE CLINIC | Facility: CLINIC | Age: 70
End: 2025-05-20
Payer: MEDICARE

## 2025-05-20 VITALS
TEMPERATURE: 98.1 F | SYSTOLIC BLOOD PRESSURE: 128 MMHG | WEIGHT: 181.4 LBS | DIASTOLIC BLOOD PRESSURE: 80 MMHG | BODY MASS INDEX: 25.97 KG/M2 | HEART RATE: 78 BPM | HEIGHT: 70 IN | OXYGEN SATURATION: 98 %

## 2025-05-20 DIAGNOSIS — J01.40 ACUTE PANSINUSITIS, RECURRENCE NOT SPECIFIED: ICD-10-CM

## 2025-05-20 DIAGNOSIS — R05.3 CHRONIC COUGH: ICD-10-CM

## 2025-05-20 DIAGNOSIS — J30.9 ALLERGIC RHINITIS, UNSPECIFIED SEASONALITY, UNSPECIFIED TRIGGER: ICD-10-CM

## 2025-05-20 DIAGNOSIS — J44.9 CHRONIC OBSTRUCTIVE PULMONARY DISEASE, UNSPECIFIED COPD TYPE (HCC): ICD-10-CM

## 2025-05-20 DIAGNOSIS — L04.0 ACUTE CERVICAL ADENITIS: ICD-10-CM

## 2025-05-20 DIAGNOSIS — J01.40 ACUTE PANSINUSITIS, RECURRENCE NOT SPECIFIED: Primary | ICD-10-CM

## 2025-05-20 PROCEDURE — 99214 OFFICE O/P EST MOD 30 MIN: CPT | Performed by: FAMILY MEDICINE

## 2025-05-20 PROCEDURE — G2211 COMPLEX E/M VISIT ADD ON: HCPCS | Performed by: FAMILY MEDICINE

## 2025-05-20 PROCEDURE — 71046 X-RAY EXAM CHEST 2 VIEWS: CPT

## 2025-05-20 PROCEDURE — 70220 X-RAY EXAM OF SINUSES: CPT

## 2025-05-20 RX ORDER — CEFUROXIME AXETIL 500 MG/1
500 TABLET ORAL EVERY 12 HOURS SCHEDULED
Qty: 20 TABLET | Refills: 0 | Status: SHIPPED | OUTPATIENT
Start: 2025-05-20 | End: 2025-05-30

## 2025-05-20 RX ORDER — BENZONATATE 200 MG/1
200 CAPSULE ORAL 3 TIMES DAILY PRN
Qty: 21 CAPSULE | Refills: 1 | Status: SHIPPED | OUTPATIENT
Start: 2025-05-20 | End: 2025-06-03

## 2025-05-20 RX ORDER — PREDNISONE 20 MG/1
TABLET ORAL
Qty: 20 TABLET | Refills: 0 | Status: SHIPPED | OUTPATIENT
Start: 2025-05-20 | End: 2025-05-30

## 2025-05-20 NOTE — PROGRESS NOTES
Name: Naa Harris      : 1955      MRN: [id number]  Encounter Provider: Sánchez Pryor DO  Encounter Date: 2025   Encounter department: [facility]  Return in 1 week if still with symptoms  :  Assessment & Plan  Acute pansinusitis, recurrence not specified  Question acute versus chronic sinusitis  Sinus x-ray ordered  Ceftin as ordered  Orders:  •  predniSONE 20 mg tablet; Take 3 tablets daily for 3 days, 2 tablets daily for 3 days, 1 tablet daily for 4 days.  Take with food  •  cefuroxime (CEFTIN) 500 mg tablet; Take 1 tablet (500 mg total) by mouth every 12 (twelve) hours for 10 days  •  XR sinuses routine 3+ views; Future  •  XR chest pa and lateral; Future    Acute cervical adenitis  Ceftin is ordered  Orders:  •  predniSONE 20 mg tablet; Take 3 tablets daily for 3 days, 2 tablets daily for 3 days, 1 tablet daily for 4 days.  Take with food  •  cefuroxime (CEFTIN) 500 mg tablet; Take 1 tablet (500 mg total) by mouth every 12 (twelve) hours for 10 days    Chronic cough  X-ray ordered  Tessalon ordered  Prednisone 20 mg tapering dose ordered  Orders:  •  benzonatate (TESSALON) 200 MG capsule; Take 1 capsule (200 mg total) by mouth 3 (three) times a day as needed for cough for up to 14 days  •  predniSONE 20 mg tablet; Take 3 tablets daily for 3 days, 2 tablets daily for 3 days, 1 tablet daily for 4 days.  Take with food  •  XR sinuses routine 3+ views; Future  •  XR chest pa and lateral; Future    Allergic rhinitis, unspecified seasonality, unspecified trigger  Stable continue present therapy, prednisone 20 mg tapering doses ordered  Orders:  •  predniSONE 20 mg tablet; Take 3 tablets daily for 3 days, 2 tablets daily for 3 days, 1 tablet daily for 4 days.  Take with food    Chronic obstructive pulmonary disease, unspecified COPD type (HCC)  Patient to follow-up pulmonary medicine, chest x-ray is ordered, prednisone 20 mg tapering dose ordered  Orders:  •  predniSONE 20 mg  "tablet; Take 3 tablets daily for 3 days, 2 tablets daily for 3 days, 1 tablet daily for 4 days.  Take with food  •  XR sinuses routine 3+ views; Future  •  XR chest pa and lateral; Future           History of Present Illness   Patient states she has had a mild cough since March.  Over the past week has gotten worse to moderate productive with clear to occasional yellow sputum.  Denies chest pain or shortness of breath patient is having increasing sinus pain and pressure for the past week patient is using all her medications as prescribed      Review of Systems   Constitutional:  Negative for chills and fever.   HENT:          HPI   Eyes: Negative.    Cardiovascular:  Negative for chest pain.   Gastrointestinal: Negative.    Endocrine: Negative.    Genitourinary: Negative.    Musculoskeletal: Negative.    Skin: Negative.    Allergic/Immunologic: Positive for environmental allergies.   Neurological: Negative.    Hematological: Negative.    Psychiatric/Behavioral: Negative.         Objective   /80 (BP Location: Right arm, Patient Position: Sitting, Cuff Size: Standard)   Pulse 78   Temp 98.1 °F (36.7 °C) (Temporal)   Ht 5' 10\" (1.778 m)   Wt 82.3 kg (181 lb 6.4 oz)   SpO2 98%   BMI 26.03 kg/m²      Physical Exam  Vitals and nursing note reviewed.   Constitutional:       General: She is not in acute distress.     Appearance: Normal appearance. She is not ill-appearing, toxic-appearing or diaphoretic.   HENT:      Head: Normocephalic and atraumatic.      Ears:      Comments: Tympanic membranes are dull no injection no fluid     Nose:      Comments: Positive allergic turbinate positive pansinus tenderness to percussion     Mouth/Throat:      Comments: Purulent postnasal drip minimal pharyngeal injection negative exudate    Eyes:      Conjunctiva/sclera: Conjunctivae normal.     Neck:      Comments: Positive tender anterior cervical adenopathy  Cardiovascular:      Rate and Rhythm: Normal rate and regular " rhythm.      Heart sounds: Normal heart sounds.   Pulmonary:      Effort: Pulmonary effort is normal. No respiratory distress.      Breath sounds: Normal breath sounds. No stridor. No wheezing, rhonchi or rales.   Chest:      Chest wall: No tenderness.     Musculoskeletal:      Cervical back: Neck supple. Tenderness present. No rigidity.      Right lower leg: No edema.      Left lower leg: No edema.   Lymphadenopathy:      Cervical: Cervical adenopathy present.     Skin:     General: Skin is warm and dry.     Neurological:      General: No focal deficit present.      Mental Status: She is alert.     Psychiatric:         Mood and Affect: Mood normal.

## 2025-05-20 NOTE — ASSESSMENT & PLAN NOTE
Stable continue present therapy, prednisone 20 mg tapering doses ordered  Orders:  •  predniSONE 20 mg tablet; Take 3 tablets daily for 3 days, 2 tablets daily for 3 days, 1 tablet daily for 4 days.  Take with food

## 2025-05-20 NOTE — ASSESSMENT & PLAN NOTE
Patient to follow-up pulmonary medicine, chest x-ray is ordered, prednisone 20 mg tapering dose ordered  Orders:  •  predniSONE 20 mg tablet; Take 3 tablets daily for 3 days, 2 tablets daily for 3 days, 1 tablet daily for 4 days.  Take with food  •  XR sinuses routine 3+ views; Future  •  XR chest pa and lateral; Future

## 2025-05-20 NOTE — PATIENT INSTRUCTIONS
Complete sinus and chest x-ray  Finish Ceftin 500 mg twice daily for 10 days  Use Tessalon every 8 hours as needed for cough  Finish prednisone 20 mg tablets as follows:  3 tablets once DFO for 3 days, 2 tablets once DFO for 3 days, 1 tablet with food for 4 days  Continue other allergy and pulmonary medications  Return in 1 week if still with symptoms

## 2025-05-22 ENCOUNTER — RESULTS FOLLOW-UP (OUTPATIENT)
Dept: FAMILY MEDICINE CLINIC | Facility: CLINIC | Age: 70
End: 2025-05-22

## 2025-05-22 DIAGNOSIS — R93.0 ABNORMAL X-RAY OF PARANASAL SINUS: ICD-10-CM

## 2025-05-22 DIAGNOSIS — J32.0 CHRONIC MAXILLARY SINUSITIS: Primary | ICD-10-CM

## 2025-05-27 ENCOUNTER — OFFICE VISIT (OUTPATIENT)
Dept: PULMONOLOGY | Facility: CLINIC | Age: 70
End: 2025-05-27
Payer: MEDICARE

## 2025-05-27 VITALS
SYSTOLIC BLOOD PRESSURE: 142 MMHG | OXYGEN SATURATION: 94 % | HEART RATE: 80 BPM | WEIGHT: 181 LBS | DIASTOLIC BLOOD PRESSURE: 82 MMHG | HEIGHT: 70 IN | BODY MASS INDEX: 25.91 KG/M2 | TEMPERATURE: 98.1 F

## 2025-05-27 DIAGNOSIS — J44.9 CHRONIC OBSTRUCTIVE PULMONARY DISEASE, UNSPECIFIED COPD TYPE (HCC): Primary | ICD-10-CM

## 2025-05-27 DIAGNOSIS — J06.9 VIRAL UPPER RESPIRATORY TRACT INFECTION: ICD-10-CM

## 2025-05-27 DIAGNOSIS — J30.9 ALLERGIC RHINITIS, UNSPECIFIED SEASONALITY, UNSPECIFIED TRIGGER: ICD-10-CM

## 2025-05-27 PROCEDURE — 99214 OFFICE O/P EST MOD 30 MIN: CPT | Performed by: INTERNAL MEDICINE

## 2025-05-27 NOTE — PROGRESS NOTES
Office Progress Note - Pulmonary    Ormegan Harris 69 y.o. female MRN: [id number]    Encounter: [id number]      Assessment:  Recent upper respiratory infection.  Chronic obstructive pulmonary disease with a recent acute exacerbation.  Allergic rhinitis.    Plan:   Complete the prednisone taper  Complete the antibiotic course.  Symbicort 160/4.5, 2 inhalations twice a day.  Spiriva Respimat 2 inhalations  once a day.  Albuterol rescue inhaler 2 elations 4 times a day as needed.  Fluticasone nasal spray 2 sprays to each nostril once a day.  Atrovent nasal spray 2 sprays to each nostril 3 times a day as needed.  Montelukast 10 mg once a day.  Saline nasal/sinus rinse once a day as needed.  Follow-up in 6 months.    Discussion:   The patient's COPD acute exacerbation has precipitated by the upper respiratory infection.  It is improving.  I told him to complete the prednisone taper and the course of the antibiotics.  I have maintained him on the Symbicort and Spiriva inhalers regimen.  She will use albuterol rescue inhaler 2 elations 4 times a day as needed.  Given the sinusitis symptoms that she had I have encouraged her to use the saline nasal/sinus rinse once a day.  I have maintained her on the fluticasone nasal spray 2 sprays to each nostril once a day and ipratropium nasal spray 2 sprays to each nostril 3 times a day as needed.  She will use the montelukast 10 mg once a day.  I will see him in 6 months.       Subjective:   The patient is here for a follow-up visit.  In March she had upper respiratory infection.  She was treated conservatively.  Recently she felt worse and had worsening cough.  She was seen by Dr. Camelia Sheehan and was started on Ceftin as well as prednisone taper.  She started to feel better.  No fever or chills.  She is on Symbicort and Spiriva as well as albuterol rescue inhaler.    Review of systems:  A 12 point system review is done and aside from what is stated above the rest of the review of  "systems is negative.      Family history and social history are reviewed.    Medications list is reviewed.      Vitals: Blood pressure 142/82, pulse 80, temperature 98.1 °F (36.7 °C), temperature source Tympanic, height 5' 10\" (1.778 m), weight 82.1 kg (181 lb), SpO2 94%, not currently breastfeeding.,     Physical Exam  Gen: Awake, alert, oriented x 3, no acute distress  HEENT: Mucous membranes moist, no oral lesions, no thrush  NECK: No accessory muscle use, JVP not elevated  Cardiac: Regular, single S1, single S2, no murmurs, no rubs, no gallops  Lungs: Decreased breath sounds.  No wheezing or rhonchi.  Abdomen: normoactive bowel sounds, soft nontender, nondistended, no rebound or rigidity, no guarding  Extremities: no cyanosis, no clubbing, no edema  Neuro:  Grossly nonfocal.  Skin:  No rash.    Chest x-rays reviewed on PACS system.  No acute pulmonary findings.    Lab Results   Component Value Date    WBC 5.99 01/07/2025    HGB 14.6 01/07/2025    HCT 44.7 01/07/2025    MCV 94 01/07/2025     01/07/2025     Lab Results   Component Value Date    SODIUM 140 01/07/2025    K 4.5 01/07/2025     01/07/2025    CO2 25 01/07/2025    BUN 15 01/07/2025    CREATININE 0.75 01/07/2025    CALCIUM 9.9 01/07/2025           "

## 2025-06-11 ENCOUNTER — OFFICE VISIT (OUTPATIENT)
Dept: FAMILY MEDICINE CLINIC | Facility: CLINIC | Age: 70
End: 2025-06-11
Payer: MEDICARE

## 2025-06-11 VITALS
WEIGHT: 178 LBS | HEART RATE: 72 BPM | HEIGHT: 70 IN | TEMPERATURE: 97.9 F | BODY MASS INDEX: 25.48 KG/M2 | OXYGEN SATURATION: 98 % | DIASTOLIC BLOOD PRESSURE: 72 MMHG | SYSTOLIC BLOOD PRESSURE: 112 MMHG

## 2025-06-11 DIAGNOSIS — J44.9 CHRONIC OBSTRUCTIVE PULMONARY DISEASE, UNSPECIFIED COPD TYPE (HCC): ICD-10-CM

## 2025-06-11 DIAGNOSIS — R93.0 ABNORMAL X-RAY OF PARANASAL SINUS: ICD-10-CM

## 2025-06-11 DIAGNOSIS — J30.9 ALLERGIC RHINITIS, UNSPECIFIED SEASONALITY, UNSPECIFIED TRIGGER: ICD-10-CM

## 2025-06-11 DIAGNOSIS — R05.3 CHRONIC COUGH: ICD-10-CM

## 2025-06-11 DIAGNOSIS — J32.0 CHRONIC MAXILLARY SINUSITIS: Primary | ICD-10-CM

## 2025-06-11 DIAGNOSIS — M19.012 PRIMARY OSTEOARTHRITIS OF LEFT SHOULDER: ICD-10-CM

## 2025-06-11 PROCEDURE — G2211 COMPLEX E/M VISIT ADD ON: HCPCS | Performed by: FAMILY MEDICINE

## 2025-06-11 PROCEDURE — 99214 OFFICE O/P EST MOD 30 MIN: CPT | Performed by: FAMILY MEDICINE

## 2025-06-11 RX ORDER — DOXYCYCLINE 100 MG/1
100 CAPSULE ORAL EVERY 12 HOURS SCHEDULED
Qty: 20 CAPSULE | Refills: 0 | Status: SHIPPED | OUTPATIENT
Start: 2025-06-11 | End: 2025-06-21

## 2025-06-11 NOTE — ASSESSMENT & PLAN NOTE
As above  Orders:  •  doxycycline hyclate (VIBRAMYCIN) 100 mg capsule; Take 1 capsule (100 mg total) by mouth every 12 (twelve) hours for 10 days

## 2025-06-11 NOTE — ASSESSMENT & PLAN NOTE
X-rays ordered refer to Dr. Butts, shoulder specialist  Orders:  •  XR shoulder 2+ vw left; Future  •  Ambulatory Referral to Orthopedic Surgery; Future

## 2025-06-11 NOTE — PATIENT INSTRUCTIONS
Doxycycline 100 mg twice a day for 10 days  Continue all allergy medications  Complete x-ray of left shoulder  Follow with orthopedics, Dr. Butts for shoulder pain  Return at scheduled appointment sooner if needed

## 2025-06-11 NOTE — PROGRESS NOTES
Name: Naa Harris      : 1955      MRN: [id number]  Encounter Provider: Sánchez Pryor DO  Encounter Date: 2025   Encounter department: [facility]  Return in 1 week if still with symptoms  :  Assessment & Plan  Chronic maxillary sinusitis  Patient is CT scheduled  Doxycycline ordered phototoxicity discussed  Orders:  •  doxycycline hyclate (VIBRAMYCIN) 100 mg capsule; Take 1 capsule (100 mg total) by mouth every 12 (twelve) hours for 10 days    Abnormal x-ray of paranasal sinus  As above       Chronic cough  X-ray clear, following with pulmonology, Dr. Salmeron       Chronic obstructive pulmonary disease, unspecified COPD type (HCC)  As above  Orders:  •  doxycycline hyclate (VIBRAMYCIN) 100 mg capsule; Take 1 capsule (100 mg total) by mouth every 12 (twelve) hours for 10 days    Allergic rhinitis, unspecified seasonality, unspecified trigger  Continue prescribed medications       Primary osteoarthritis of left shoulder  X-rays ordered refer to Dr. Butts, shoulder specialist  Orders:  •  XR shoulder 2+ vw left; Future  •  Ambulatory Referral to Orthopedic Surgery; Future           History of Present Illness   Patient was here at the end of May.  For sinus and chest congestion placed on antibiotics and prednisone.  Patient got somewhat better but still has cough.  Chest x-ray was clear patient has sinus CT scheduled.  Patient did see pulmonology.  In addition patient is having left shoulder pain she had this off and on for years.  Last x-ray was  which showed some arthritis      Review of Systems   Constitutional: Negative.    HENT:          HPI   Eyes: Negative.    Respiratory:          HPI   Cardiovascular: Negative.    Gastrointestinal: Negative.    Endocrine: Negative.    Genitourinary: Negative.    Musculoskeletal:         HPI   Skin: Negative.    Allergic/Immunologic: Positive for environmental allergies.   Neurological: Negative.    Hematological: Negative.   "  Psychiatric/Behavioral: Negative.         Objective   /72 (BP Location: Right arm, Patient Position: Sitting, Cuff Size: Standard)   Pulse 72   Temp 97.9 °F (36.6 °C) (Skin)   Ht 5' 10\" (1.778 m)   Wt 80.7 kg (178 lb)   SpO2 98%   BMI 25.54 kg/m²      Physical Exam  Vitals and nursing note reviewed.   Constitutional:       Appearance: Normal appearance.   HENT:      Head: Normocephalic and atraumatic.      Right Ear: Tympanic membrane normal.      Left Ear: Tympanic membrane normal.      Nose:      Comments: Positive allergic turbinates     Mouth/Throat:      Pharynx: No oropharyngeal exudate or posterior oropharyngeal erythema.      Comments: Scant clear postnasal drip    Eyes:      General: No scleral icterus.      Cardiovascular:      Rate and Rhythm: Normal rate and regular rhythm.      Heart sounds: Normal heart sounds.   Pulmonary:      Effort: Pulmonary effort is normal.      Breath sounds: Normal breath sounds.     Musculoskeletal:         General: No swelling, tenderness or deformity.      Cervical back: Neck supple.      Comments: Left shoulder full range of motion negative point tenderness negative gross deformity   Lymphadenopathy:      Cervical: No cervical adenopathy.     Skin:     General: Skin is warm and dry.     Neurological:      General: No focal deficit present.      Mental Status: She is alert.     Psychiatric:         Mood and Affect: Mood normal.         "

## 2025-06-12 DIAGNOSIS — J44.9 CHRONIC OBSTRUCTIVE PULMONARY DISEASE, UNSPECIFIED COPD TYPE (HCC): ICD-10-CM

## 2025-06-12 RX ORDER — TIOTROPIUM BROMIDE INHALATION SPRAY 3.12 UG/1
2 SPRAY, METERED RESPIRATORY (INHALATION) DAILY
Qty: 4 G | Refills: 5 | Status: SHIPPED | OUTPATIENT
Start: 2025-06-12

## 2025-06-12 NOTE — TELEPHONE ENCOUNTER
Reason for call:   [x] Refill   [] Prior Auth  [] Other:     Office:   [] PCP/Provider -   [x] Specialty/Provider - pulm    Medication:   - Spiriva Respimat 2.5 mcg inhaler- inhale 2 puffs daily      Pharmacy: [facility]    Local Pharmacy   Does the patient have enough for 3 days?   [x] Yes   [] No - Send as HP to POD    Mail Away Pharmacy   Does the patient have enough for 10 days?   [] Yes   [] No - Send as HP to POD

## 2025-06-17 ENCOUNTER — APPOINTMENT (OUTPATIENT)
Dept: RADIOLOGY | Facility: MEDICAL CENTER | Age: 70
End: 2025-06-17
Attending: FAMILY MEDICINE
Payer: MEDICARE

## 2025-06-17 DIAGNOSIS — M19.012 PRIMARY OSTEOARTHRITIS OF LEFT SHOULDER: ICD-10-CM

## 2025-06-17 PROCEDURE — 73030 X-RAY EXAM OF SHOULDER: CPT

## 2025-06-22 ENCOUNTER — RESULTS FOLLOW-UP (OUTPATIENT)
Dept: FAMILY MEDICINE CLINIC | Facility: CLINIC | Age: 70
End: 2025-06-22

## 2025-06-23 ENCOUNTER — HOSPITAL ENCOUNTER (OUTPATIENT)
Dept: RADIOLOGY | Facility: HOSPITAL | Age: 70
Discharge: HOME | End: 2025-06-23
Payer: MEDICARE

## 2025-06-23 DIAGNOSIS — J32.0 CHRONIC MAXILLARY SINUSITIS: ICD-10-CM

## 2025-06-23 DIAGNOSIS — R93.0 ABNORMAL X-RAY OF PARANASAL SINUS: ICD-10-CM

## 2025-06-23 PROCEDURE — 70486 CT MAXILLOFACIAL W/O DYE: CPT

## 2025-06-25 ENCOUNTER — OFFICE VISIT (OUTPATIENT)
Dept: OBGYN CLINIC | Facility: OTHER | Age: 70
End: 2025-06-25
Payer: MEDICARE

## 2025-06-25 VITALS — HEIGHT: 70 IN | BODY MASS INDEX: 25.48 KG/M2 | WEIGHT: 178 LBS

## 2025-06-25 DIAGNOSIS — M75.52 SUBACROMIAL BURSITIS OF LEFT SHOULDER JOINT: Primary | ICD-10-CM

## 2025-06-25 DIAGNOSIS — M19.012 PRIMARY OSTEOARTHRITIS OF LEFT SHOULDER: ICD-10-CM

## 2025-06-25 PROCEDURE — 99214 OFFICE O/P EST MOD 30 MIN: CPT | Performed by: ORTHOPAEDIC SURGERY

## 2025-06-25 NOTE — PROGRESS NOTES
Orthopaedic Surgery - Office Note  Naa Harris (69 y.o. female)   : 1955   MRN: [id number]  Encounter Date: 2025    Assessment & Plan  Subacromial bursitis of left shoulder joint    Orders:    Ambulatory Referral to Physical Therapy; Future        Assessment / Plan    Left shoulder subacromial bursitis   Referral given to begin PT to work on scapular strengthening  She can use her arm as tolerated for ADL's   If she fails to improve we can consider an MRI or CSI  Ice, heat and anti-inflammatories prn   Follow-up:  Return in about 2 months (around 2025) for follow up with Dr. Butts.      Chief Complaint / Date of Onset  Left shoulder pain; since spring 2025  Injury Mechanism / Date  No recent injury   In 2024 she lost her footing for a second and reached her left arm out to grab a fence but did not notice pain in the shoulder at that time  History of dislocation in early  then a subsequent fall in  both of these injuries were treated non-operatively with PT  Surgery / Date  None    History of Present Illness   Naa Harris is a 69 y.o. female who presents for evaluation of left shoulder pain. She has had a few injuries over the past 25 years (listed above). She did loose her footing in December on ice and grab a fence but doesn't recall any injury. She feels over the past few months she had has an achy pain in her shoulder. She describes her pain as being lateral and worsening at night. Pain is relieved with heat. She feels this pain waxes and weans with no pattern. She can do her ADL's with mild modification. She does get a painful pop at times. She denies any radiating symptoms or instability.     Treatment Summary  Medications / Modalities  Heating pad prn  Bracing / Immobilization  None  Physical Therapy  None  Injections  None  Prior Surgeries  None  Other Treatments  None    Employment / Current Status  Retired     Sport / Organization / Current Status  Active  "      Review of Systems  Pertinent items are noted in HPI.  All other systems were reviewed and are negative.      Physical Exam  Ht 5' 10\" (1.778 m)   Wt 80.7 kg (178 lb)   BMI 25.54 kg/m²   Cons: Appears well.  No apparent distress.  Psych: Alert. Oriented x3.  Mood and affect normal.  Eyes: PERRLA, EOMI  Resp: Normal effort.  No audible wheezing or stridor.  CV: Palpable pulse.  No discernable arrhythmia.  No LE edema.  Lymph:  No palpable cervical, axillary, or inguinal lymphadenopathy.  Skin: Warm.  No palpable masses.  No visible lesions.  Neuro: Normal muscle tone.  Normal and symmetric DTR's.     Left Shoulder Exam  Alignment / Posture:  Normal shoulder posture.  Inspection:  No swelling. No ecchymosis.  Palpation:  mild bursa tenderness. No effusion.  ROM:  Shoulder . Shoulder ER 70. Shoulder IR T8.  Strength:  5/5 supraspinatus, infraspinatus, and subscapularis.  Stability:  No objective shoulder instability.  Tests: (+) Wyatt. (+) Neer.  Neurovascular:  Sensation intact in Ax/R/M/U nerve distributions. 2+ radial pulse.       Studies Reviewed  I have personally reviewed pertinent films in PACS.  XR of left shoulder - images from 25 showing no abnormalities or fractures      Procedures  No procedures today.    Medical, Surgical, Family, and Social History  The patient's medical history, family history, and social history, were reviewed and updated as appropriate.    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   Tobacco Use    Smoking status: Former     Current packs/day: 0.00     Average packs/day: 3.0 packs/day for 14.0 years (42.0 ttl pk-yrs)     Types: Cigarettes     Start date:      Quit date:      Years since quittin.5     Passive exposure: Past    Smokeless tobacco: Never   Vaping Use    Vaping status: Never Used   Substance and Sexual Activity    " Alcohol use: Not Currently     Comment: per [employer]-stopped drinking alcohol    Drug use: No     Comment: per [employer]-recovering from drug addiction    Sexual activity: Not Currently       Allergies[4]    Current Medications[5]      Bhumi Davison    Scribe Attestation      I,:   am acting as a scribe while in the presence of the attending physician.:       I,:   personally performed the services described in this documentation    as scribed in my presence.:                [1]   Past Medical History:  Diagnosis Date    Cataract     COPD (chronic obstructive pulmonary disease) (HCC)     History of total hysterectomy     Hyperlipidemia     Hypothyroid     Osteoarthritis    [2]   Past Surgical History:  Procedure Laterality Date    BLEPHAROPLASTY Bilateral     upper lid - Dr. Wilkerson    CATARACT EXTRACTION, BILATERAL      COLONOSCOPY      GANGLION CYST EXCISION Right 3/14/2023    Procedure: right long finger ganglion cyst 3 cm x 1 cm x 1 cm excision;  Surgeon: Edmar Gan MD;  Location: [facility];  Service: Orthopedics    HYSTERECTOMY      REVISION COLOSTOMY  1976    SHOULDER SURGERY  2006    S/P dislocated shoulder   [3]   Family History  Problem Relation Name Age of Onset    Breast cancer Mother      Hypertension Mother      Alcohol abuse Father      Drug abuse Sister     [4]   Allergies  Allergen Reactions    Alcohol - Food Allergy     Other      narcotics      Mobic [Meloxicam] GI Intolerance   [5]   Current Outpatient Medications:     Ascorbic Acid (vitamin C) 1000 MG tablet, Take 1,000 mg by mouth in the morning., Disp: , Rfl:     calcium carbonate (OS-BERNICE) 600 MG tablet, Take 1,200 mg by mouth in the morning., Disp: , Rfl:     carbamide peroxide (DEBROX) 6.5 % otic solution, Administer 5 drops into both ears in the morning and 5 drops in the evening., Disp: , Rfl:     celecoxib (CeleBREX) 200 mg capsule, TAKE 1 CAPSULE DAILY WITH FOOD AS NEEDED FOR ARTHRITIS PAIN, Disp: 30 capsule, Rfl: 5     Cholecalciferol (Vitamin D) 50 MCG (2000 UT) tablet, Take 2,000 Units by mouth in the morning., Disp: , Rfl:     fluticasone (FLONASE) 50 mcg/act nasal spray, 2 sprays into each nostril in the morning., Disp: , Rfl:     ipratropium (ATROVENT) 0.03 % nasal spray, 2 sprays into each nostril 2 (two) times a day, Disp: 90 mL, Rfl: 2    levothyroxine 50 mcg tablet, TAKE 1 TABLET (50 MCG TOTAL) BY MOUTH DAILY IN THE EARLY MORNING, Disp: 90 tablet, Rfl: 1    loratadine-pseudoephedrine (CLARITIN-D 24-HOUR)  mg per 24 hr tablet, Take 1 tablet by mouth in the morning., Disp: , Rfl:     montelukast (SINGULAIR) 10 mg tablet, TAKE 1 TABLET BY MOUTH EVERY DAY, Disp: 90 tablet, Rfl: 1    Multiple Vitamins-Minerals (multivitamin with minerals) tablet, Take 1 tablet by mouth in the morning., Disp: , Rfl:     Omega-3 Fatty Acids (FISH OIL) 1,000 mg, Take 3,000 mg by mouth in the morning., Disp: , Rfl:     RESTASIS 0.05 % ophthalmic emulsion, , Disp: , Rfl:     rosuvastatin (CRESTOR) 5 mg tablet, TAKE 1 TABLET BY MOUTH EVERY DAY, Disp: 90 tablet, Rfl: 1    Symbicort 160-4.5 MCG/ACT inhaler, INHALE 2 PUFFS BY MOUTH TWICE A DAY RINSE MOUTH AFTER USE, Disp: 10.2 g, Rfl: 5    tiotropium (Spiriva Respimat) 2.5 MCG/ACT AERS inhaler, Inhale 2 puffs daily, Disp: 4 g, Rfl: 5

## 2025-06-26 PROBLEM — J32.4 CHRONIC PANSINUSITIS: Status: ACTIVE | Noted: 2025-06-26

## 2025-07-08 ENCOUNTER — EVALUATION (OUTPATIENT)
Dept: PHYSICAL THERAPY | Facility: REHABILITATION | Age: 70
End: 2025-07-08
Attending: ORTHOPAEDIC SURGERY
Payer: MEDICARE

## 2025-07-08 DIAGNOSIS — M75.52 SUBACROMIAL BURSITIS OF LEFT SHOULDER JOINT: ICD-10-CM

## 2025-07-08 PROCEDURE — 97161 PT EVAL LOW COMPLEX 20 MIN: CPT | Performed by: PHYSICAL THERAPIST

## 2025-07-08 PROCEDURE — 97110 THERAPEUTIC EXERCISES: CPT | Performed by: PHYSICAL THERAPIST

## 2025-07-08 NOTE — PROGRESS NOTES
PT Evaluation     Today's date: 2025  Patient name: Naa Harris  : 1955  MRN: [id number]  Referring provider: Stoney Butts, *  Dx:   Encounter Diagnosis     ICD-10-CM    1. Subacromial bursitis of left shoulder joint  M75.52 Ambulatory Referral to Physical Therapy          Start Time: 1620  Stop Time: 1700  Total time in clinic (min): 40 minutes    Assessment  Impairments: abnormal or restricted ROM, activity intolerance, impaired physical strength, lacks appropriate home exercise program, pain with function and poor posture   Symptom irritability: low    Assessment details: Patient is a 69 y.o. female that presents with left shoulder pain.  Patient presents with decreased strength, decreased ROM, and postural abnormalities.  Patient has difficulty with sleeping, walking her dog (holding a leash), and household activities such as making her bed or completing her yard worksecondary to impairments.  Patient would benefit from skilled physical therapy services to address impairments to maximize function.      Understanding of Dx/Px/POC: good    Goals  Impairment:  1.  Patient will reports 50% reduction in pain in 4 weeks to maximize function.  2.  Patient will improve strength to 4/5 in all planes to maximize function.  3.  Patient will improve ROM to WFL in 4 weeks to maximize function.    Functional:  1. Patient will improve FOTO by 5 points to 67/100 in 4 weeks to maximize function.  2. Patient will be independent with HEP in 4 weeks to maximize function.  3. Patient will report no difficulty with sleeping in 4 weeks to maximize function.  4. Patient will report no difficulty with housework in 4 weeks to maximize function.  5. Patient will report no difficulty with yard work in 4 weeks to maximize function.          Plan  Patient would benefit from: skilled physical therapy  Planned modality interventions: cryotherapy    Planned therapy interventions: manual therapy, neuromuscular re-education,  patient/caregiver education, postural training, therapeutic activities, strengthening, therapeutic exercise, home exercise program, functional ROM exercises and activity modification    Frequency: 1x week  Duration in weeks: 4  Treatment plan discussed with: patient  Plan details: Patient will be a RE in 4 weeks.          Subjective Evaluation    History of Present Illness  Mechanism of injury: Patient presents with left shoulder pain that started around March/2025.  Patient reports a history of left shoulder dislocation in  and a fall in  which caused shoulder pain.  Patient reports current pain started after being sick secondary to COPD in March.  Patient had a near fall in 2024 when she had a near fall and grabbed onto a fence.  Patient had x-ray imaging which was negative for significant pathology.  Patient feels like her pain is in the bone.  Patient reports difficulty with sleeping, walking her dog (holding a leash), and household activities such as making her bed or completing her yard work.  She feels her symptoms have improved overall in the last few months.  She had been using the heating pad for relief, but has not needed it as much recently.  Patient denies radicular symptoms.    Patient Goals  Patient goals for therapy: increased strength, decreased pain and increased motion    Pain  At best pain ratin  At worst pain ratin  Location: L shoulder  Quality: sharp  Progression: improved    Hand dominance: right      Diagnostic Tests  X-ray: normal  Treatments  Current treatment: physical therapy        Objective     Static Posture     Head  Forward.    Shoulders  Rounded.    Palpation   Left   No palpable tenderness to the levator scapulae, lower trapezius, middle trapezius, rhomboids and upper trapezius.     Tenderness     Left Shoulder   Tenderness in the AC joint, biceps tendon (proximal), coracoid process and subacromial bursa. No tenderness in the scapular spine.      Active Range of Motion   Left Shoulder   Flexion: 141 degrees   Abduction: 153 degrees   Internal rotation BTB: T8     Right Shoulder   Flexion: 148 degrees   Abduction: WFL  Internal rotation BTB: T6     Passive Range of Motion   Left Shoulder   Flexion: WFL and with pain  External rotation 90°: WFL and with pain  Internal rotation 90°: WFL and with pain    Strength/Myotome Testing     Left Shoulder     Planes of Motion   Flexion: 4   Abduction: 4-   External rotation at 90°: 4 (pain)   Internal rotation at 90°: 4     Right Shoulder     Planes of Motion   Flexion: 4   Abduction: 4-     Tests     Left Shoulder   Positive Hawkin's and Neer's.              Precautions: COPD, hx L shoulder dislocation      Manuals 7/8                                                                Neuro Re-Ed             Row with band Issued blue            Shoulder ext band             Perturbations with PT                                                                 Ther Ex             UBE for/rev             IR band Issued red            ER band Issued red            Serratus punches             Shoulder isometric flex, ext, abd             Supine flexion band                                       Ther Activity                                       Gait Training                                       Modalities             Ice PRN

## 2025-07-15 ENCOUNTER — OFFICE VISIT (OUTPATIENT)
Dept: PHYSICAL THERAPY | Facility: REHABILITATION | Age: 70
End: 2025-07-15
Attending: ORTHOPAEDIC SURGERY
Payer: MEDICARE

## 2025-07-15 DIAGNOSIS — M75.52 SUBACROMIAL BURSITIS OF LEFT SHOULDER JOINT: Primary | ICD-10-CM

## 2025-07-15 PROCEDURE — 97110 THERAPEUTIC EXERCISES: CPT

## 2025-07-15 PROCEDURE — 97112 NEUROMUSCULAR REEDUCATION: CPT

## 2025-07-22 ENCOUNTER — OFFICE VISIT (OUTPATIENT)
Dept: PHYSICAL THERAPY | Facility: REHABILITATION | Age: 70
End: 2025-07-22
Attending: ORTHOPAEDIC SURGERY
Payer: MEDICARE

## 2025-07-22 DIAGNOSIS — M75.52 SUBACROMIAL BURSITIS OF LEFT SHOULDER JOINT: Primary | ICD-10-CM

## 2025-07-22 PROCEDURE — 97112 NEUROMUSCULAR REEDUCATION: CPT

## 2025-07-22 PROCEDURE — 97110 THERAPEUTIC EXERCISES: CPT

## 2025-07-22 NOTE — PROGRESS NOTES
"Daily Note     Today's date: 2025  Patient name: Naa Harris  : 1955  MRN: [id number]  Referring provider: Stoney Butts, *  Dx:   Encounter Diagnosis     ICD-10-CM    1. Subacromial bursitis of left shoulder joint  M75.52           Start Time: 1615  Stop Time: 1705  Total time in clinic (min): 50 minutes    Subjective: Pt reports mild muscle soreness following last visit.  Pt reports no longer having pain symptoms she was previously having and feels she is improved since seeing the doctor last.      Objective: See treatment diary below    Precautions: COPD, hx L shoulder dislocation      Manuals 7/8 7/15 7/22                                                              Neuro Re-Ed             Row with band Issued blue Blue 5\"x15 Blue 5\"x20          Shoulder ext band  Green 5\"x15 Green 5\"x15          Perturbations with PT  30\"x2 @ 90 30\"x2 @ 90                                                              Ther Ex             UBE for/rev  2 min ea 2 min ea          IR band Issued red Red 5\"x15 Red 5\"x20          ER band Issued red Red 5\"x15 Red 5\"x20          Serratus punches  5\"x10 2# 5\"x15          Shoulder isometric flex, ext, abd  5\"x10 ea 5\"x10 ea          Supine flexion band  Red 5\"x10 Red 5\"x15                                    Ther Activity                                       Gait Training                                       Modalities             Ice PRN  5 min 5 min                           Assessment:  Tolerated treatment well.  Pt was progressed with strengthening as noted with good tolerance.  Cueing provided to isolate RTC musculature with resisted IR/ER and to facilitate slow controlled movement with TE to maximize strength gains with exercises. Cont to progress as able.  Patient demonstrated fatigue post treatment and would benefit from continued PT      Plan: Progress treatment as tolerated.          "

## 2025-07-29 ENCOUNTER — EVALUATION (OUTPATIENT)
Dept: PHYSICAL THERAPY | Facility: REHABILITATION | Age: 70
End: 2025-07-29
Attending: ORTHOPAEDIC SURGERY
Payer: MEDICARE

## 2025-07-29 DIAGNOSIS — M75.52 SUBACROMIAL BURSITIS OF LEFT SHOULDER JOINT: Primary | ICD-10-CM

## 2025-07-29 PROCEDURE — 97112 NEUROMUSCULAR REEDUCATION: CPT | Performed by: PHYSICAL THERAPIST

## 2025-07-29 PROCEDURE — 97110 THERAPEUTIC EXERCISES: CPT | Performed by: PHYSICAL THERAPIST

## 2025-07-29 PROCEDURE — 97140 MANUAL THERAPY 1/> REGIONS: CPT | Performed by: PHYSICAL THERAPIST

## 2025-08-06 DIAGNOSIS — E78.5 HYPERLIPIDEMIA, UNSPECIFIED HYPERLIPIDEMIA TYPE: ICD-10-CM

## 2025-08-07 ENCOUNTER — OFFICE VISIT (OUTPATIENT)
Dept: FAMILY MEDICINE CLINIC | Facility: CLINIC | Age: 70
End: 2025-08-07
Payer: MEDICARE

## 2025-08-07 ENCOUNTER — APPOINTMENT (OUTPATIENT)
Dept: LAB | Facility: CLINIC | Age: 70
End: 2025-08-07
Payer: MEDICARE

## 2025-08-07 VITALS
HEART RATE: 68 BPM | SYSTOLIC BLOOD PRESSURE: 130 MMHG | BODY MASS INDEX: 25.86 KG/M2 | WEIGHT: 180.6 LBS | OXYGEN SATURATION: 97 % | DIASTOLIC BLOOD PRESSURE: 76 MMHG | HEIGHT: 70 IN

## 2025-08-07 DIAGNOSIS — J32.4 CHRONIC PANSINUSITIS: ICD-10-CM

## 2025-08-07 DIAGNOSIS — E78.2 MIXED HYPERLIPIDEMIA: ICD-10-CM

## 2025-08-07 DIAGNOSIS — Z80.3 FAMILY HISTORY OF BREAST CANCER IN MOTHER: ICD-10-CM

## 2025-08-07 DIAGNOSIS — Z98.890 S/P COLONOSCOPY: ICD-10-CM

## 2025-08-07 DIAGNOSIS — J44.9 CHRONIC OBSTRUCTIVE PULMONARY DISEASE, UNSPECIFIED COPD TYPE (HCC): Primary | ICD-10-CM

## 2025-08-07 DIAGNOSIS — M81.0 AGE-RELATED OSTEOPOROSIS WITHOUT CURRENT PATHOLOGICAL FRACTURE: ICD-10-CM

## 2025-08-07 DIAGNOSIS — E03.9 ACQUIRED HYPOTHYROIDISM: ICD-10-CM

## 2025-08-07 PROCEDURE — 99214 OFFICE O/P EST MOD 30 MIN: CPT | Performed by: FAMILY MEDICINE

## 2025-08-07 PROCEDURE — G2211 COMPLEX E/M VISIT ADD ON: HCPCS | Performed by: FAMILY MEDICINE

## 2025-08-07 RX ORDER — ROSUVASTATIN CALCIUM 5 MG/1
5 TABLET, COATED ORAL DAILY
Qty: 90 TABLET | Refills: 1 | Status: SHIPPED | OUTPATIENT
Start: 2025-08-07

## 2025-08-08 ENCOUNTER — OFFICE VISIT (OUTPATIENT)
Dept: PODIATRY | Facility: CLINIC | Age: 70
End: 2025-08-08
Payer: MEDICARE

## 2025-08-08 VITALS — HEIGHT: 70 IN | BODY MASS INDEX: 25.62 KG/M2 | WEIGHT: 179 LBS

## 2025-08-08 DIAGNOSIS — R20.2 NUMBNESS AND TINGLING: ICD-10-CM

## 2025-08-08 DIAGNOSIS — M20.12 VALGUS DEFORMITY OF BOTH GREAT TOES: ICD-10-CM

## 2025-08-08 DIAGNOSIS — R20.0 NUMBNESS AND TINGLING: ICD-10-CM

## 2025-08-08 DIAGNOSIS — M25.572 LEFT ANKLE PAIN, UNSPECIFIED CHRONICITY: Primary | ICD-10-CM

## 2025-08-08 DIAGNOSIS — M20.11 VALGUS DEFORMITY OF BOTH GREAT TOES: ICD-10-CM

## 2025-08-08 PROCEDURE — 99212 OFFICE O/P EST SF 10 MIN: CPT | Performed by: PODIATRIST

## (undated) DEVICE — SPONGE PVP SCRUB WING STERILE

## (undated) DEVICE — OCCLUSIVE GAUZE STRIP,3% BISMUTH TRIBROMOPHENATE IN PETROLATUM BLEND: Brand: XEROFORM

## (undated) DEVICE — STERILE BETHLEHEM PLASTIC HAND: Brand: CARDINAL HEALTH

## (undated) DEVICE — GLOVE INDICATOR PI UNDERGLOVE SZ 8 BLUE

## (undated) DEVICE — CUFF TOURNIQUET 18 X 4 IN QUICK CONNECT DISP 1 BLADDER

## (undated) DEVICE — PADDING CAST 4 IN  COTTON STRL

## (undated) DEVICE — NEEDLE 25G X 1 1/2

## (undated) DEVICE — SUT PROLENE 4-0 PS-2 18 IN 8682G

## (undated) DEVICE — GLOVE SRG BIOGEL 7.5

## (undated) DEVICE — PREMIUM DRY TRAY LF: Brand: MEDLINE INDUSTRIES, INC.

## (undated) DEVICE — DISPOSABLE EQUIPMENT COVER: Brand: SMALL TOWEL DRAPE

## (undated) DEVICE — GAUZE SPONGES,16 PLY: Brand: CURITY

## (undated) DEVICE — ACE WRAP 4 IN UNSTERILE